# Patient Record
Sex: FEMALE | Race: WHITE | Employment: FULL TIME | URBAN - NONMETROPOLITAN AREA
[De-identification: names, ages, dates, MRNs, and addresses within clinical notes are randomized per-mention and may not be internally consistent; named-entity substitution may affect disease eponyms.]

---

## 2017-04-05 ENCOUNTER — HOSPITAL ENCOUNTER (OUTPATIENT)
Dept: PHARMACY | Age: 77
Setting detail: THERAPIES SERIES
Discharge: HOME | End: 2017-04-05
Payer: MEDICARE

## 2017-04-05 ENCOUNTER — HOSPITAL ENCOUNTER (OUTPATIENT)
Age: 77
Discharge: HOME | End: 2017-04-05
Payer: MEDICARE

## 2017-04-05 VITALS
HEART RATE: 81 BPM | DIASTOLIC BLOOD PRESSURE: 69 MMHG | BODY MASS INDEX: 35.53 KG/M2 | SYSTOLIC BLOOD PRESSURE: 134 MMHG | WEIGHT: 207 LBS

## 2017-04-05 DIAGNOSIS — R73.01 IMPAIRED FASTING GLUCOSE: ICD-10-CM

## 2017-04-05 DIAGNOSIS — E78.2 MIXED HYPERLIPIDEMIA: ICD-10-CM

## 2017-04-05 DIAGNOSIS — Z79.01 LONG TERM CURRENT USE OF ANTICOAGULANT THERAPY: ICD-10-CM

## 2017-04-05 LAB
ALT SERPL-CCNC: 18 U/L (ref 5–33)
ANION GAP SERPL CALCULATED.3IONS-SCNC: 11 MMOL/L (ref 9–17)
AST SERPL-CCNC: 17 U/L
BUN BLDV-MCNC: 16 MG/DL (ref 8–23)
BUN/CREAT BLD: 26 (ref 9–20)
CALCIUM SERPL-MCNC: 9.4 MG/DL (ref 8.6–10.4)
CHLORIDE BLD-SCNC: 104 MMOL/L (ref 98–107)
CHOLESTEROL/HDL RATIO: 3.3
CHOLESTEROL: 200 MG/DL
CO2: 26 MMOL/L (ref 20–31)
CREAT SERPL-MCNC: 0.62 MG/DL (ref 0.5–0.9)
ESTIMATED AVERAGE GLUCOSE: 148 MG/DL
GFR AFRICAN AMERICAN: >60 ML/MIN
GFR NON-AFRICAN AMERICAN: >60 ML/MIN
GFR SERPL CREATININE-BSD FRML MDRD: ABNORMAL ML/MIN/{1.73_M2}
GFR SERPL CREATININE-BSD FRML MDRD: ABNORMAL ML/MIN/{1.73_M2}
GLUCOSE BLD-MCNC: 117 MG/DL (ref 70–99)
HBA1C MFR BLD: 6.8 % (ref 4.8–5.9)
HDLC SERPL-MCNC: 61 MG/DL
INR BLD: 2.5
LDL CHOLESTEROL: 102 MG/DL (ref 0–130)
POTASSIUM SERPL-SCNC: 4.7 MMOL/L (ref 3.7–5.3)
SODIUM BLD-SCNC: 141 MMOL/L (ref 135–144)
TRIGL SERPL-MCNC: 186 MG/DL
VLDLC SERPL CALC-MCNC: ABNORMAL MG/DL (ref 1–30)

## 2017-04-05 PROCEDURE — 36415 COLL VENOUS BLD VENIPUNCTURE: CPT

## 2017-04-05 PROCEDURE — 80048 BASIC METABOLIC PNL TOTAL CA: CPT

## 2017-04-05 PROCEDURE — 84450 TRANSFERASE (AST) (SGOT): CPT

## 2017-04-05 PROCEDURE — 83036 HEMOGLOBIN GLYCOSYLATED A1C: CPT

## 2017-04-05 PROCEDURE — G0463 HOSPITAL OUTPT CLINIC VISIT: HCPCS

## 2017-04-05 PROCEDURE — 85610 PROTHROMBIN TIME: CPT

## 2017-04-05 PROCEDURE — 80061 LIPID PANEL: CPT

## 2017-04-05 PROCEDURE — 84460 ALANINE AMINO (ALT) (SGPT): CPT

## 2017-04-26 ENCOUNTER — ANTI-COAG VISIT (OUTPATIENT)
Dept: PHARMACY | Age: 77
End: 2017-04-26

## 2017-04-26 DIAGNOSIS — Z79.01 LONG TERM CURRENT USE OF ANTICOAGULANT THERAPY: ICD-10-CM

## 2017-05-18 ENCOUNTER — APPOINTMENT (OUTPATIENT)
Dept: PHARMACY | Age: 77
End: 2017-05-18
Payer: MEDICARE

## 2017-05-22 ENCOUNTER — HOSPITAL ENCOUNTER (OUTPATIENT)
Dept: PHARMACY | Age: 77
Setting detail: THERAPIES SERIES
Discharge: HOME | End: 2017-05-22
Payer: MEDICARE

## 2017-05-22 VITALS
HEART RATE: 103 BPM | WEIGHT: 206.8 LBS | DIASTOLIC BLOOD PRESSURE: 79 MMHG | BODY MASS INDEX: 35.5 KG/M2 | SYSTOLIC BLOOD PRESSURE: 147 MMHG

## 2017-05-22 DIAGNOSIS — Z79.01 LONG TERM CURRENT USE OF ANTICOAGULANT THERAPY: ICD-10-CM

## 2017-05-22 LAB — INR BLD: 1.8

## 2017-05-22 PROCEDURE — 99211 OFF/OP EST MAY X REQ PHY/QHP: CPT

## 2017-05-22 PROCEDURE — G0463 HOSPITAL OUTPT CLINIC VISIT: HCPCS

## 2017-05-22 PROCEDURE — 85610 PROTHROMBIN TIME: CPT

## 2017-05-31 ENCOUNTER — APPOINTMENT (OUTPATIENT)
Dept: PHARMACY | Age: 77
End: 2017-05-31
Payer: MEDICARE

## 2017-07-10 ENCOUNTER — HOSPITAL ENCOUNTER (OUTPATIENT)
Age: 77
Discharge: HOME | End: 2017-07-10
Payer: MEDICARE

## 2017-07-10 ENCOUNTER — HOSPITAL ENCOUNTER (OUTPATIENT)
Dept: PHARMACY | Age: 77
Setting detail: THERAPIES SERIES
Discharge: HOME | End: 2017-07-10
Payer: MEDICARE

## 2017-07-10 VITALS
DIASTOLIC BLOOD PRESSURE: 88 MMHG | WEIGHT: 208.9 LBS | HEART RATE: 73 BPM | BODY MASS INDEX: 35.86 KG/M2 | SYSTOLIC BLOOD PRESSURE: 147 MMHG

## 2017-07-10 DIAGNOSIS — Z79.01 LONG TERM CURRENT USE OF ANTICOAGULANT THERAPY: ICD-10-CM

## 2017-07-10 LAB
HEMOGLOBIN: 14.3 G/DL (ref 12–16)
INR BLD: 2

## 2017-07-10 PROCEDURE — 85018 HEMOGLOBIN: CPT

## 2017-07-10 PROCEDURE — 99211 OFF/OP EST MAY X REQ PHY/QHP: CPT | Performed by: FAMILY MEDICINE

## 2017-07-10 PROCEDURE — 85610 PROTHROMBIN TIME: CPT | Performed by: FAMILY MEDICINE

## 2017-07-10 PROCEDURE — G0463 HOSPITAL OUTPT CLINIC VISIT: HCPCS | Performed by: FAMILY MEDICINE

## 2017-07-10 PROCEDURE — 36415 COLL VENOUS BLD VENIPUNCTURE: CPT

## 2017-07-12 ENCOUNTER — APPOINTMENT (OUTPATIENT)
Dept: PHARMACY | Age: 77
End: 2017-07-12
Payer: MEDICARE

## 2017-08-10 ENCOUNTER — HOSPITAL ENCOUNTER (OUTPATIENT)
Dept: PHARMACY | Age: 77
Setting detail: THERAPIES SERIES
Discharge: HOME | End: 2017-08-10
Payer: MEDICARE

## 2017-08-10 VITALS
HEART RATE: 77 BPM | DIASTOLIC BLOOD PRESSURE: 96 MMHG | BODY MASS INDEX: 38.34 KG/M2 | WEIGHT: 209.6 LBS | SYSTOLIC BLOOD PRESSURE: 150 MMHG

## 2017-08-10 DIAGNOSIS — Z79.01 LONG TERM CURRENT USE OF ANTICOAGULANT THERAPY: ICD-10-CM

## 2017-08-10 LAB — INR BLD: 3.6

## 2017-08-10 PROCEDURE — 85610 PROTHROMBIN TIME: CPT | Performed by: FAMILY MEDICINE

## 2017-08-10 PROCEDURE — 99211 OFF/OP EST MAY X REQ PHY/QHP: CPT | Performed by: FAMILY MEDICINE

## 2017-08-16 DIAGNOSIS — N90.89 VULVAR IRRITATION: ICD-10-CM

## 2017-08-16 RX ORDER — ACYCLOVIR 50 MG/G
OINTMENT TOPICAL
Qty: 1 TUBE | Refills: 3 | Status: SHIPPED | OUTPATIENT
Start: 2017-08-16 | End: 2017-08-23

## 2017-08-31 ENCOUNTER — HOSPITAL ENCOUNTER (OUTPATIENT)
Age: 77
Setting detail: SPECIMEN
Discharge: HOME | End: 2017-08-31
Payer: MEDICARE

## 2017-08-31 LAB
ANION GAP SERPL CALCULATED.3IONS-SCNC: 14 MMOL/L (ref 9–17)
BUN BLDV-MCNC: 10 MG/DL (ref 8–23)
BUN/CREAT BLD: 17 (ref 9–20)
CALCIUM SERPL-MCNC: 9.2 MG/DL (ref 8.6–10.4)
CHLORIDE BLD-SCNC: 100 MMOL/L (ref 98–107)
CO2: 27 MMOL/L (ref 20–31)
CREAT SERPL-MCNC: 0.6 MG/DL (ref 0.5–0.9)
GFR AFRICAN AMERICAN: >60 ML/MIN
GFR NON-AFRICAN AMERICAN: >60 ML/MIN
GFR SERPL CREATININE-BSD FRML MDRD: NORMAL ML/MIN/{1.73_M2}
GFR SERPL CREATININE-BSD FRML MDRD: NORMAL ML/MIN/{1.73_M2}
GLUCOSE BLD-MCNC: 97 MG/DL (ref 70–99)
HCT VFR BLD CALC: 36.9 % (ref 36–46)
HEMOGLOBIN: 12.3 G/DL (ref 12–16)
INR BLD: 1.3
MCH RBC QN AUTO: 29.8 PG (ref 26–34)
MCHC RBC AUTO-ENTMCNC: 33.5 G/DL (ref 31–37)
MCV RBC AUTO: 88.9 FL (ref 80–100)
PDW BLD-RTO: 13.8 % (ref 11.5–14.5)
PLATELET # BLD: 261 K/UL (ref 130–400)
PMV BLD AUTO: ABNORMAL FL (ref 6–12)
POTASSIUM SERPL-SCNC: 4.4 MMOL/L (ref 3.7–5.3)
PROTHROMBIN TIME: 13.1 SEC (ref 9.7–11.6)
RBC # BLD: 4.14 M/UL (ref 4–5.2)
SODIUM BLD-SCNC: 141 MMOL/L (ref 135–144)
WBC # BLD: 11.3 K/UL (ref 3.5–11)

## 2017-08-31 PROCEDURE — P9603 ONE-WAY ALLOW PRORATED MILES: HCPCS

## 2017-08-31 PROCEDURE — 36415 COLL VENOUS BLD VENIPUNCTURE: CPT

## 2017-08-31 PROCEDURE — 85610 PROTHROMBIN TIME: CPT

## 2017-08-31 PROCEDURE — 80048 BASIC METABOLIC PNL TOTAL CA: CPT

## 2017-08-31 PROCEDURE — 85027 COMPLETE CBC AUTOMATED: CPT

## 2017-09-01 ENCOUNTER — HOSPITAL ENCOUNTER (OUTPATIENT)
Age: 77
Setting detail: SPECIMEN
Discharge: HOME | End: 2017-09-01
Payer: MEDICARE

## 2017-09-01 LAB
INR BLD: 1.3
PROTHROMBIN TIME: 13.4 SEC (ref 9.7–11.6)

## 2017-09-01 PROCEDURE — 85610 PROTHROMBIN TIME: CPT

## 2017-09-01 PROCEDURE — P9603 ONE-WAY ALLOW PRORATED MILES: HCPCS

## 2017-09-01 PROCEDURE — 36415 COLL VENOUS BLD VENIPUNCTURE: CPT

## 2017-09-02 ENCOUNTER — HOSPITAL ENCOUNTER (OUTPATIENT)
Age: 77
Setting detail: SPECIMEN
Discharge: HOME | End: 2017-09-02
Payer: MEDICARE

## 2017-09-02 LAB
INR BLD: 1.2
PROTHROMBIN TIME: 12.7 SEC (ref 9.7–11.6)

## 2017-09-02 PROCEDURE — P9603 ONE-WAY ALLOW PRORATED MILES: HCPCS

## 2017-09-02 PROCEDURE — 85610 PROTHROMBIN TIME: CPT

## 2017-09-02 PROCEDURE — 36415 COLL VENOUS BLD VENIPUNCTURE: CPT

## 2017-09-04 ENCOUNTER — HOSPITAL ENCOUNTER (OUTPATIENT)
Age: 77
Setting detail: SPECIMEN
Discharge: HOME | End: 2017-09-04
Payer: MEDICARE

## 2017-09-04 LAB
INR BLD: 1.2
PROTHROMBIN TIME: 12.8 SEC (ref 9.7–11.6)

## 2017-09-04 PROCEDURE — 36415 COLL VENOUS BLD VENIPUNCTURE: CPT

## 2017-09-04 PROCEDURE — 85610 PROTHROMBIN TIME: CPT

## 2017-09-04 PROCEDURE — P9603 ONE-WAY ALLOW PRORATED MILES: HCPCS

## 2017-09-05 ENCOUNTER — HOSPITAL ENCOUNTER (OUTPATIENT)
Age: 77
Setting detail: SPECIMEN
Discharge: HOME | End: 2017-09-05
Payer: MEDICARE

## 2017-09-05 LAB
INR BLD: 1.1
PROTHROMBIN TIME: 11.6 SEC (ref 9.7–11.6)

## 2017-09-05 PROCEDURE — 85610 PROTHROMBIN TIME: CPT

## 2017-09-05 PROCEDURE — P9603 ONE-WAY ALLOW PRORATED MILES: HCPCS

## 2017-09-05 PROCEDURE — 36415 COLL VENOUS BLD VENIPUNCTURE: CPT

## 2017-09-08 ENCOUNTER — HOSPITAL ENCOUNTER (OUTPATIENT)
Age: 77
Setting detail: SPECIMEN
Discharge: HOME | End: 2017-09-08
Payer: MEDICARE

## 2017-09-08 LAB
INR BLD: 1.4
PROTHROMBIN TIME: 14.7 SEC (ref 9.7–11.6)

## 2017-09-08 PROCEDURE — P9603 ONE-WAY ALLOW PRORATED MILES: HCPCS

## 2017-09-08 PROCEDURE — 36415 COLL VENOUS BLD VENIPUNCTURE: CPT

## 2017-09-08 PROCEDURE — 85610 PROTHROMBIN TIME: CPT

## 2017-09-09 ENCOUNTER — HOSPITAL ENCOUNTER (OUTPATIENT)
Age: 77
Setting detail: SPECIMEN
Discharge: HOME | End: 2017-09-09
Payer: MEDICARE

## 2017-09-09 LAB
INR BLD: 1.6
PROTHROMBIN TIME: 17.3 SEC (ref 9.7–11.6)

## 2017-09-09 PROCEDURE — 85610 PROTHROMBIN TIME: CPT

## 2017-09-09 PROCEDURE — 36415 COLL VENOUS BLD VENIPUNCTURE: CPT

## 2017-09-09 PROCEDURE — P9603 ONE-WAY ALLOW PRORATED MILES: HCPCS

## 2017-09-12 ENCOUNTER — HOSPITAL ENCOUNTER (OUTPATIENT)
Age: 77
Setting detail: SPECIMEN
Discharge: HOME | End: 2017-09-12
Payer: MEDICARE

## 2017-09-12 LAB
INR BLD: 2
PROTHROMBIN TIME: 21.4 SEC (ref 9.7–11.6)

## 2017-09-12 PROCEDURE — 36415 COLL VENOUS BLD VENIPUNCTURE: CPT

## 2017-09-12 PROCEDURE — P9603 ONE-WAY ALLOW PRORATED MILES: HCPCS

## 2017-09-12 PROCEDURE — 85610 PROTHROMBIN TIME: CPT

## 2017-09-14 ENCOUNTER — HOSPITAL ENCOUNTER (OUTPATIENT)
Age: 77
Setting detail: SPECIMEN
Discharge: HOME | End: 2017-09-14
Payer: MEDICARE

## 2017-09-14 LAB
INR BLD: 1.3
PROTHROMBIN TIME: 13.4 SEC (ref 9.7–11.6)

## 2017-09-14 PROCEDURE — P9603 ONE-WAY ALLOW PRORATED MILES: HCPCS

## 2017-09-14 PROCEDURE — 85610 PROTHROMBIN TIME: CPT

## 2017-09-14 PROCEDURE — 36415 COLL VENOUS BLD VENIPUNCTURE: CPT

## 2017-09-21 ENCOUNTER — HOSPITAL ENCOUNTER (OUTPATIENT)
Dept: PHARMACY | Age: 77
Setting detail: THERAPIES SERIES
Discharge: HOME | End: 2017-09-21
Payer: MEDICARE

## 2017-09-21 VITALS — BODY MASS INDEX: 37.31 KG/M2 | WEIGHT: 204 LBS

## 2017-09-21 DIAGNOSIS — Z79.01 LONG TERM CURRENT USE OF ANTICOAGULANT THERAPY: ICD-10-CM

## 2017-09-21 LAB — INR BLD: 3.3

## 2017-09-21 PROCEDURE — 99211 OFF/OP EST MAY X REQ PHY/QHP: CPT

## 2017-09-21 PROCEDURE — 85610 PROTHROMBIN TIME: CPT

## 2017-09-23 ENCOUNTER — APPOINTMENT (OUTPATIENT)
Dept: CT IMAGING | Age: 77
End: 2017-09-23
Payer: MEDICARE

## 2017-09-23 ENCOUNTER — HOSPITAL ENCOUNTER (EMERGENCY)
Age: 77
Discharge: ANOTHER HOSPITAL | End: 2017-09-23
Attending: EMERGENCY MEDICINE
Payer: MEDICARE

## 2017-09-23 ENCOUNTER — HOSPITAL ENCOUNTER (EMERGENCY)
Age: 77
Discharge: HOME | End: 2017-09-23
Attending: EMERGENCY MEDICINE
Payer: MEDICARE

## 2017-09-23 ENCOUNTER — APPOINTMENT (OUTPATIENT)
Dept: GENERAL RADIOLOGY | Age: 77
End: 2017-09-23
Payer: MEDICARE

## 2017-09-23 VITALS
SYSTOLIC BLOOD PRESSURE: 110 MMHG | RESPIRATION RATE: 25 BRPM | TEMPERATURE: 102.8 F | HEART RATE: 116 BPM | DIASTOLIC BLOOD PRESSURE: 73 MMHG | OXYGEN SATURATION: 94 %

## 2017-09-23 VITALS
RESPIRATION RATE: 16 BRPM | DIASTOLIC BLOOD PRESSURE: 56 MMHG | SYSTOLIC BLOOD PRESSURE: 107 MMHG | TEMPERATURE: 99.7 F | HEART RATE: 95 BPM | OXYGEN SATURATION: 91 %

## 2017-09-23 DIAGNOSIS — M54.50 ACUTE BILATERAL LOW BACK PAIN WITHOUT SCIATICA: ICD-10-CM

## 2017-09-23 DIAGNOSIS — R50.9 FEVER IN ADULT: ICD-10-CM

## 2017-09-23 DIAGNOSIS — A41.9 SEPSIS, DUE TO UNSPECIFIED ORGANISM: ICD-10-CM

## 2017-09-23 DIAGNOSIS — R50.9 FEVER IN ADULT: Primary | ICD-10-CM

## 2017-09-23 DIAGNOSIS — M54.50 ACUTE BILATERAL LOW BACK PAIN WITHOUT SCIATICA: Primary | ICD-10-CM

## 2017-09-23 DIAGNOSIS — D72.829 LEUKOCYTOSIS, UNSPECIFIED TYPE: ICD-10-CM

## 2017-09-23 LAB
-: ABNORMAL
ABSOLUTE BANDS #: 1.4 K/UL (ref 0–1)
ABSOLUTE EOS #: 0 K/UL (ref 0–0.4)
ABSOLUTE LYMPH #: 1.24 K/UL (ref 1–4.8)
ABSOLUTE MONO #: 1.4 K/UL (ref 0–1)
ALBUMIN SERPL-MCNC: 4 G/DL (ref 3.5–5.2)
ALBUMIN/GLOBULIN RATIO: 1.1 (ref 1–2.5)
ALLEN TEST: ABNORMAL
ALP BLD-CCNC: 85 U/L (ref 35–104)
ALT SERPL-CCNC: 11 U/L (ref 5–33)
AMORPHOUS: ABNORMAL
ANION GAP SERPL CALCULATED.3IONS-SCNC: 16 MMOL/L (ref 9–17)
AST SERPL-CCNC: 12 U/L
BACTERIA: ABNORMAL
BANDS: 9 %
BASOPHILS # BLD: 0 %
BASOPHILS ABSOLUTE: 0 K/UL (ref 0–0.2)
BILIRUB SERPL-MCNC: 0.78 MG/DL (ref 0.3–1.2)
BILIRUBIN URINE: NEGATIVE
BUN BLDV-MCNC: 7 MG/DL (ref 8–23)
BUN/CREAT BLD: 12 (ref 9–20)
CALCIUM SERPL-MCNC: 9.2 MG/DL (ref 8.6–10.4)
CARBOXYHEMOGLOBIN: ABNORMAL % (ref 0–5)
CASTS UA: ABNORMAL /LPF
CHLORIDE BLD-SCNC: 97 MMOL/L (ref 98–107)
CO2: 22 MMOL/L (ref 20–31)
COLOR: YELLOW
COMMENT UA: ABNORMAL
CREAT SERPL-MCNC: 0.58 MG/DL (ref 0.5–0.9)
CRYSTALS, UA: ABNORMAL /HPF
DIFFERENTIAL TYPE: ABNORMAL
EOSINOPHILS RELATIVE PERCENT: 0 %
EPITHELIAL CELLS UA: ABNORMAL /HPF (ref 0–25)
FIO2: ABNORMAL
GFR AFRICAN AMERICAN: >60 ML/MIN
GFR NON-AFRICAN AMERICAN: >60 ML/MIN
GFR SERPL CREATININE-BSD FRML MDRD: ABNORMAL ML/MIN/{1.73_M2}
GFR SERPL CREATININE-BSD FRML MDRD: ABNORMAL ML/MIN/{1.73_M2}
GLUCOSE BLD-MCNC: 132 MG/DL (ref 70–99)
GLUCOSE URINE: NEGATIVE
HCO3 VENOUS: 25.6 MMOL/L (ref 24–30)
HCT VFR BLD CALC: 37.1 % (ref 36–46)
HEMOGLOBIN: 12.2 G/DL (ref 12–16)
INR BLD: 2 (ref 0.9–1.2)
KETONES, URINE: ABNORMAL
LACTIC ACID, WHOLE BLOOD: NORMAL MMOL/L (ref 0.7–2.1)
LACTIC ACID: 1.1 MMOL/L (ref 0.5–2.2)
LEUKOCYTE ESTERASE, URINE: NEGATIVE
LIPASE: 15 U/L (ref 13–60)
LYMPHOCYTES # BLD: 8 %
MCH RBC QN AUTO: 29.1 PG (ref 26–34)
MCHC RBC AUTO-ENTMCNC: 32.9 G/DL (ref 31–37)
MCV RBC AUTO: 88.4 FL (ref 80–100)
METHEMOGLOBIN: ABNORMAL % (ref 0–1.9)
MODE: ABNORMAL
MONOCYTES # BLD: 9 %
MORPHOLOGY: NORMAL
MUCUS: ABNORMAL
NEGATIVE BASE EXCESS, VEN: ABNORMAL MMOL/L (ref 0–2)
NITRITE, URINE: NEGATIVE
NOTIFICATION TIME: ABNORMAL
NOTIFICATION: ABNORMAL
O2 DEVICE/FLOW/%: ABNORMAL
O2 SAT, VEN: 55.1 % (ref 60–85)
OTHER OBSERVATIONS UA: ABNORMAL
OXYHEMOGLOBIN: ABNORMAL % (ref 95–98)
PATIENT TEMP: 37
PCO2, VEN, TEMP ADJ: ABNORMAL MMHG (ref 39–55)
PCO2, VEN: 42.4 (ref 39–55)
PDW BLD-RTO: 14.3 % (ref 12.1–15.2)
PEEP/CPAP: ABNORMAL
PH UA: 6 (ref 5–9)
PH VENOUS: 7.4 (ref 7.32–7.42)
PH, VEN, TEMP ADJ: ABNORMAL (ref 7.32–7.42)
PLATELET # BLD: 351 K/UL (ref 140–450)
PLATELET ESTIMATE: ABNORMAL
PMV BLD AUTO: 7.6 FL (ref 6–12)
PO2, VEN, TEMP ADJ: ABNORMAL MMHG (ref 30–50)
PO2, VEN: 29.2 (ref 30–50)
POSITIVE BASE EXCESS, VEN: 0.6 MMOL/L (ref 0–2)
POTASSIUM SERPL-SCNC: 4.1 MMOL/L (ref 3.7–5.3)
PROTEIN UA: NEGATIVE
PROTHROMBIN TIME: 21.4 SEC (ref 9.7–12.2)
PSV: ABNORMAL
PT. POSITION: ABNORMAL
RBC # BLD: 4.2 M/UL (ref 4–5.2)
RBC # BLD: ABNORMAL 10*6/UL
RBC UA: ABNORMAL /HPF (ref 0–2)
RENAL EPITHELIAL, UA: ABNORMAL /HPF
RESPIRATORY RATE: ABNORMAL
SAMPLE SITE: ABNORMAL
SEG NEUTROPHILS: 74 %
SEGMENTED NEUTROPHILS ABSOLUTE COUNT: 11.46 K/UL (ref 1.8–7.7)
SET RATE: ABNORMAL
SODIUM BLD-SCNC: 135 MMOL/L (ref 135–144)
SPECIFIC GRAVITY UA: 1.01 (ref 1.01–1.02)
TEXT FOR RESPIRATORY: ABNORMAL
TOTAL HB: ABNORMAL G/DL (ref 12–16)
TOTAL PROTEIN: 7.5 G/DL (ref 6.4–8.3)
TOTAL RATE: ABNORMAL
TRICHOMONAS: ABNORMAL
TURBIDITY: CLEAR
URINE HGB: NEGATIVE
UROBILINOGEN, URINE: NORMAL
VT: ABNORMAL
WBC # BLD: 15.5 K/UL (ref 3.5–11)
WBC # BLD: ABNORMAL 10*3/UL
WBC UA: ABNORMAL /HPF (ref 0–5)
YEAST: ABNORMAL

## 2017-09-23 PROCEDURE — 86403 PARTICLE AGGLUT ANTBDY SCRN: CPT

## 2017-09-23 PROCEDURE — 96361 HYDRATE IV INFUSION ADD-ON: CPT

## 2017-09-23 PROCEDURE — 83690 ASSAY OF LIPASE: CPT

## 2017-09-23 PROCEDURE — 99285 EMERGENCY DEPT VISIT HI MDM: CPT

## 2017-09-23 PROCEDURE — 2580000003 HC RX 258: Performed by: EMERGENCY MEDICINE

## 2017-09-23 PROCEDURE — 87186 SC STD MICRODIL/AGAR DIL: CPT

## 2017-09-23 PROCEDURE — 74177 CT ABD & PELVIS W/CONTRAST: CPT

## 2017-09-23 PROCEDURE — 93005 ELECTROCARDIOGRAM TRACING: CPT

## 2017-09-23 PROCEDURE — 85025 COMPLETE CBC W/AUTO DIFF WBC: CPT

## 2017-09-23 PROCEDURE — 81001 URINALYSIS AUTO W/SCOPE: CPT

## 2017-09-23 PROCEDURE — 6360000002 HC RX W HCPCS: Performed by: EMERGENCY MEDICINE

## 2017-09-23 PROCEDURE — 96375 TX/PRO/DX INJ NEW DRUG ADDON: CPT

## 2017-09-23 PROCEDURE — 80053 COMPREHEN METABOLIC PANEL: CPT

## 2017-09-23 PROCEDURE — 85610 PROTHROMBIN TIME: CPT

## 2017-09-23 PROCEDURE — 96367 TX/PROPH/DG ADDL SEQ IV INF: CPT

## 2017-09-23 PROCEDURE — 6360000004 HC RX CONTRAST MEDICATION: Performed by: EMERGENCY MEDICINE

## 2017-09-23 PROCEDURE — 71020 XR CHEST STANDARD TWO VW: CPT

## 2017-09-23 PROCEDURE — 83605 ASSAY OF LACTIC ACID: CPT

## 2017-09-23 PROCEDURE — 87205 SMEAR GRAM STAIN: CPT

## 2017-09-23 PROCEDURE — 36415 COLL VENOUS BLD VENIPUNCTURE: CPT

## 2017-09-23 PROCEDURE — 99284 EMERGENCY DEPT VISIT MOD MDM: CPT

## 2017-09-23 PROCEDURE — 6370000000 HC RX 637 (ALT 250 FOR IP): Performed by: EMERGENCY MEDICINE

## 2017-09-23 PROCEDURE — 96365 THER/PROPH/DIAG IV INF INIT: CPT

## 2017-09-23 PROCEDURE — 87040 BLOOD CULTURE FOR BACTERIA: CPT

## 2017-09-23 PROCEDURE — 82805 BLOOD GASES W/O2 SATURATION: CPT

## 2017-09-23 PROCEDURE — 96374 THER/PROPH/DIAG INJ IV PUSH: CPT

## 2017-09-23 RX ORDER — ONDANSETRON 2 MG/ML
4 INJECTION INTRAMUSCULAR; INTRAVENOUS ONCE
Status: COMPLETED | OUTPATIENT
Start: 2017-09-23 | End: 2017-09-23

## 2017-09-23 RX ORDER — CYCLOBENZAPRINE HCL 10 MG
5 TABLET ORAL 3 TIMES DAILY PRN
Qty: 20 TABLET | Refills: 0 | Status: SHIPPED | OUTPATIENT
Start: 2017-09-23 | End: 2017-10-03

## 2017-09-23 RX ORDER — HYDROCODONE BITARTRATE AND ACETAMINOPHEN 5; 325 MG/1; MG/1
1 TABLET ORAL EVERY 6 HOURS PRN
COMMUNITY
End: 2017-11-10

## 2017-09-23 RX ORDER — ACETAMINOPHEN 500 MG
1000 TABLET ORAL ONCE
Status: COMPLETED | OUTPATIENT
Start: 2017-09-23 | End: 2017-09-23

## 2017-09-23 RX ORDER — SODIUM CHLORIDE 9 MG/ML
INJECTION, SOLUTION INTRAVENOUS CONTINUOUS
Status: DISCONTINUED | OUTPATIENT
Start: 2017-09-23 | End: 2017-09-23 | Stop reason: HOSPADM

## 2017-09-23 RX ORDER — FENTANYL CITRATE 50 UG/ML
25 INJECTION, SOLUTION INTRAMUSCULAR; INTRAVENOUS ONCE
Status: COMPLETED | OUTPATIENT
Start: 2017-09-23 | End: 2017-09-23

## 2017-09-23 RX ORDER — OXYCODONE HYDROCHLORIDE 5 MG/1
5 TABLET ORAL EVERY 4 HOURS PRN
COMMUNITY
End: 2017-11-10 | Stop reason: ALTCHOICE

## 2017-09-23 RX ORDER — MORPHINE SULFATE 10 MG/ML
4 INJECTION, SOLUTION INTRAMUSCULAR; INTRAVENOUS ONCE
Status: COMPLETED | OUTPATIENT
Start: 2017-09-23 | End: 2017-09-23

## 2017-09-23 RX ADMIN — SODIUM CHLORIDE: 9 INJECTION, SOLUTION INTRAVENOUS at 13:39

## 2017-09-23 RX ADMIN — ONDANSETRON 4 MG: 2 INJECTION INTRAMUSCULAR; INTRAVENOUS at 11:59

## 2017-09-23 RX ADMIN — FENTANYL CITRATE 25 MCG: 50 INJECTION INTRAMUSCULAR; INTRAVENOUS at 18:41

## 2017-09-23 RX ADMIN — IOPAMIDOL 100 ML: 612 INJECTION, SOLUTION INTRAVENOUS at 13:02

## 2017-09-23 RX ADMIN — SODIUM CHLORIDE 999 ML: 9 INJECTION, SOLUTION INTRAVENOUS at 18:41

## 2017-09-23 RX ADMIN — ACETAMINOPHEN 1000 MG: 500 TABLET ORAL at 13:24

## 2017-09-23 RX ADMIN — HYDROMORPHONE HYDROCHLORIDE 0.5 MG: 1 INJECTION, SOLUTION INTRAMUSCULAR; INTRAVENOUS; SUBCUTANEOUS at 12:30

## 2017-09-23 RX ADMIN — ONDANSETRON 4 MG: 2 INJECTION INTRAMUSCULAR; INTRAVENOUS at 20:27

## 2017-09-23 RX ADMIN — SODIUM CHLORIDE: 9 INJECTION, SOLUTION INTRAVENOUS at 20:27

## 2017-09-23 RX ADMIN — PIPERACILLIN SODIUM,TAZOBACTAM SODIUM 3.38 G: 3; .375 INJECTION, POWDER, FOR SOLUTION INTRAVENOUS at 18:47

## 2017-09-23 RX ADMIN — SODIUM CHLORIDE 500 ML: 9 INJECTION, SOLUTION INTRAVENOUS at 11:59

## 2017-09-23 RX ADMIN — MORPHINE SULFATE 4 MG: 10 INJECTION, SOLUTION INTRAMUSCULAR; INTRAVENOUS at 11:59

## 2017-09-23 RX ADMIN — ACETAMINOPHEN 1000 MG: 500 TABLET ORAL at 20:28

## 2017-09-23 RX ADMIN — VANCOMYCIN HYDROCHLORIDE 1000 MG: 1 INJECTION, POWDER, LYOPHILIZED, FOR SOLUTION INTRAVENOUS at 19:18

## 2017-09-23 ASSESSMENT — PAIN SCALES - GENERAL
PAINLEVEL_OUTOF10: 10
PAINLEVEL_OUTOF10: 6
PAINLEVEL_OUTOF10: 10
PAINLEVEL_OUTOF10: 5
PAINLEVEL_OUTOF10: 5
PAINLEVEL_OUTOF10: 6
PAINLEVEL_OUTOF10: 5
PAINLEVEL_OUTOF10: 6
PAINLEVEL_OUTOF10: 2
PAINLEVEL_OUTOF10: 4

## 2017-09-23 ASSESSMENT — PAIN DESCRIPTION - ORIENTATION: ORIENTATION: LOWER

## 2017-09-23 ASSESSMENT — PAIN DESCRIPTION - PAIN TYPE
TYPE: ACUTE PAIN

## 2017-09-23 ASSESSMENT — PAIN DESCRIPTION - PROGRESSION
CLINICAL_PROGRESSION: GRADUALLY IMPROVING
CLINICAL_PROGRESSION: GRADUALLY IMPROVING

## 2017-09-23 ASSESSMENT — PAIN DESCRIPTION - LOCATION
LOCATION: BACK

## 2017-09-23 ASSESSMENT — PAIN DESCRIPTION - DESCRIPTORS
DESCRIPTORS: ACHING
DESCRIPTORS: ACHING

## 2017-09-23 NOTE — ED NOTES
[facility] called back with Dr. Stephanie Dasilva on line. Currently speaking with Dr. Kris Wilcox.      [facility] JESSY Reser  09/23/17 6809

## 2017-09-23 NOTE — ED AVS SNAPSHOT
After Visit Summary  (Discharge Instructions)    Medication List for Home    Based on the information you provided to us as well as any changes during this visit, the following is your updated medication list.  Compare this with your prescription bottles at home. If you have any questions or concerns, contact your primary care physician's office. Daily Medication List (This medication list can be shared with any Healthcare provider who is helping you manage your medications)      There are NEW medications for you. START taking them after you leave the hospital     cyclobenzaprine 10 MG tablet   Commonly known as:  FLEXERIL   Take 0.5 tablets by mouth 3 times daily as needed for Muscle spasms         ASK your doctor about these medications if you have questions     acetaminophen 500 MG tablet   Commonly known as:  TYLENOL   Take 500 mg by mouth every 4 hours as needed       ALPRAZolam 0.25 MG tablet   Commonly known as:  XANAX   Take 1 tablet by mouth daily as needed for Anxiety       CoQ-10 100 MG Caps   Take 100 mg by mouth daily       ezetimibe 10 MG tablet   Commonly known as:  ZETIA   Take 1 tablet by mouth daily       HYDROcodone-acetaminophen 5-325 MG per tablet   Commonly known as:  NORCO   Take 1 tablet by mouth every 6 hours as needed for Pain . oxyCODONE 5 MG immediate release tablet   Commonly known as:  ROXICODONE   Take 5 mg by mouth every 4 hours as needed for Pain .        rosuvastatin 5 MG tablet   Commonly known as:  CRESTOR   Take 1 tablet by mouth nightly       sertraline 100 MG tablet   Commonly known as:  ZOLOFT   Take 1 tablet by mouth daily (take along with 50 mg tablet for total daily dose of 150 mg daily)       traMADol 50 MG tablet   Commonly known as:  ULTRAM   Take 1 tablet by mouth 3 times daily as needed for Pain       warfarin 7.5 MG tablet   Commonly known as:  COUMADIN   Take 1 tablet by mouth daily            Where to Get Your Medications You can get these medications from any pharmacy     Bring a paper prescription for each of these medications     cyclobenzaprine 10 MG tablet               Allergies as of 2017        Reactions    Latex Other (See Comments)    redness    Percocet [Oxycodone-acetaminophen] Itching      Immunizations as of 2017     Name Date Dose VIS Date Route    Influenza Whole 2015 -- -- --    Comment: [facility]    Pneumococcal Conjugate 7-valent 2016 -- -- --    External: Patient reported    [facility]         After Visit Summary    This summary was created for you. Thank you for entrusting your care to us. The following information includes details about your hospital/visit stay along with steps you should take to help with your recovery once you leave the hospital.  In this packet, you will find information about the topics listed below:    · Instructions about your medications including a list of your home medications  · A summary of your hospital visit  · Follow-up appointments once you have left the hospital  · Your care plan at home      You may receive a survey regarding the care you received during your stay. Your input is valuable to us. We encourage you to complete and return your survey in the envelope provided. We hope you will choose us in the future for your healthcare needs. Patient Information     Patient Name ROLF Felipe 1940      Care Provided at:     Name Address Phone       [address] Marquez [phone]            Your Visit    Here you will find information about your visit, including the reason for your visit. Please take this sheet with you when you visit your doctor or other health care provider in the future. It will help determine the best possible medical care for you at that time. If you have any questions once you leave the hospital, please call the department phone number listed below. the hospital.  Your care team will discuss these with you, so you and those caring for you know how to best care for your health needs at home. This section may also include educational information about certain health topics that may be of help to you. Important Information if you smoke or are exposed to smoking       SMOKING: QUIT SMOKING. THIS IS THE MOST IMPORTANT ACTION YOU CAN TAKE TO IMPROVE YOUR CURRENT AND FUTURE HEALTH. Call the [facility]way at [facility] ([phone])    Smoking harms nonsmokers. When nonsmokers are around people who smoke, they absorb nicotine, carbon monoxide, and other ingredients of tobacco smoke. DO NOT SMOKE AROUND CHILDREN     Children exposed to secondhand smoke are at an increased risk of:  Sudden Infant Death Syndrome (SIDS), acute respiratory infections, inflammation of the middle ear, and severe asthma. Over a longer time, it causes heart disease and lung cancer. There is no safe level of exposure to secondhand smoke. Important information for a smoker       SMOKING: QUIT SMOKING. THIS IS THE MOST IMPORTANT ACTION YOU CAN TAKE TO IMPROVE YOUR CURRENT AND FUTURE HEALTH. Call the [address] [place] [address] at [facility] ([phone])    Smoking harms nonsmokers. When nonsmokers are around people who smoke, they absorb nicotine, carbon monoxide, and other ingredients of tobacco smoke. DO NOT SMOKE AROUND CHILDREN     Children exposed to secondhand smoke are at an increased risk of:  Sudden Infant Death Syndrome (SIDS), acute respiratory infections, inflammation of the middle ear, and severe asthma. Over a longer time, it causes heart disease and lung cancer. There is no safe level of exposure to secondhand smoke.                 [employer] Signup     [employer] allows you to send messages to your doctor, view your test results, renew your prescriptions, schedule appointments, view visit Try one of these positions when you lie down:  ¨ Lie on your back with your knees bent and supported by large pillows. ¨ Lie on the floor with your legs on the seat of a sofa or chair. Kelvin Sidle on your side with your knees and hips bent and a pillow between your legs. ¨ Lie on your stomach if it does not make pain worse. · Do not sit up in bed, and avoid soft couches and twisted positions. Bed rest can help relieve pain at first, but it delays healing. Avoid bed rest after the first day of back pain. · Change positions every 30 minutes. If you must sit for long periods of time, take breaks from sitting. Get up and walk around, or lie in a comfortable position. · Try using a heating pad on a low or medium setting for 15 to 20 minutes every 2 or 3 hours. Try a warm shower in place of one session with the heating pad. · You can also try an ice pack for 10 to 15 minutes every 2 to 3 hours. Put a thin cloth between the ice pack and your skin. · Take pain medicines exactly as directed. ¨ If the doctor gave you a prescription medicine for pain, take it as prescribed. ¨ If you are not taking a prescription pain medicine, ask your doctor if you can take an over-the-counter medicine. · Take short walks several times a day. You can start with 5 to 10 minutes, 3 or 4 times a day, and work up to longer walks. Walk on level surfaces and avoid hills and stairs until your back is better. · Return to work and other activities as soon as you can. Continued rest without activity is usually not good for your back. · To prevent future back pain, do exercises to stretch and strengthen your back and stomach. Learn how to use good posture, safe lifting techniques, and proper body mechanics. When should you call for help? Call your doctor now or seek immediate medical care if:  · You have new or worsening numbness in your legs. · You have new or worsening weakness in your legs.  (This could make it hard to stand up.) · You lose control of your bladder or bowels. Watch closely for changes in your health, and be sure to contact your doctor if:  · Your pain gets worse. · You are not getting better after 2 weeks. Where can you learn more? Go to https://chqasim.[employer]rax[employer]. org and sign in to your [employer] account. Enter R502 in the NPC III box to learn more about \"Back Pain: Care Instructions. \"     If you do not have an account, please click on the \"Sign Up Now\" link. Current as of: March 21, 2017  Content Version: 11.3  © 6178-0522 [employer]. Care instructions adapted under license by [facility] ([facility]). If you have questions about a medical condition or this instruction, always ask your healthcare professional. [address] any warranty or liability for your use of this information. More Information           Learning About Fever  What is a fever? A fever is a high body temperature. It's one way your body fights being sick. A fever shows that the body is responding to infection or other illnesses, both minor and severe. A fever is a symptom, not an illness by itself. A fever can be a sign that you are ill, but most fevers are not caused by a serious problem. You may have a fever with a minor illness, such as a cold. But sometimes a very serious infection may cause little or no fever. It is important to look at other symptoms, other conditions you have, and how you feel in general. In children, notice how they act and see what symptoms they complain of. What is a normal body temperature? A normal body temperature is about 98. 6ºF. Some people have a normal temperature that is a little higher or a little lower than this. Your temperature may be a little lower in the morning than it is later in the day. It may go up during hot weather or when you exercise, wear heavy clothes, or take a hot bath. or this instruction, always ask your healthcare professional. [place] [address] any warranty or liability for your use of this information.

## 2017-09-23 NOTE — ED NOTES
Alexandra Roberson [address] Access re possible transfer. Access currently speaking with Dr. Jamila Joseph.      Magdy JIMÉNEZ Reser  09/23/17 2483

## 2017-09-23 NOTE — ED NOTES
Dr Zofia Leiva paged covering for Dr Salome Oswald via answering service     Clara Rollins  09/23/17 5060

## 2017-09-23 NOTE — ED PROVIDER NOTES
[facility] ED  eMERGENCY dEPARTMENT eNCOUnter      Pt Name: Claudeen Scurry  MRN: [id number]  [place] 1940  Date of evaluation: 9/23/2017  Provider: Max Sinha. Jeannine Lewis, [address] [place] [address]       Chief Complaint   Patient presents with    Lower Back Pain     Onset yesterday morning.  c/o lower back pain without radiation to legs. Onset gradual but denies injury that she can remember. HISTORY OF PRESENT ILLNESS   (Location/Symptom, Timing/Onset, Context/Setting, Quality, Duration, Modifying Factors, Severity)  Note limiting factors. Claudeen Scurry is a 68 y.o. female who presents to the emergency department complaining of low back pain bilaterally. No fall or injury. She was walking with her walker yesterday, pain started suddenly. She had right knee TKA end of august in [place]. She has never had back pain. No difficulty breathing, constant, no change with respiration. Movement does not affect pain, but worse supine. No lightheadedness or chest pain. No lower extremity symptoms    HPI      Nursing Notes were reviewed. REVIEW OF SYSTEMS    (2-9 systems for level 4, 10 or more for level 5)     Review of Systems    Except as noted above the remainder of the review of systems was reviewed and negative.        PAST MEDICAL HISTORY     Past Medical History:   Diagnosis Date    Adenomatous colon polyp 7/2015    tubular adenoma    Anxiety     Depression     Diverticulitis 2009    Factor V Leiden ([facility] [address]     GERD (gastroesophageal reflux disease)     History of blood transfusion     no reaction    History of DVT (deep vein thrombosis)     DVT right leg    Hyperlipidemia     Impaired fasting glucose     Kidney stones           SURGICAL HISTORY       Past Surgical History:   Procedure Laterality Date    COLONOSCOPY  2011    Dr. Severa Sayre - no polyps, pan-diverticulosis    COLONOSCOPY  7/23/2015    -polyp (tubular adenoma),diverticulosis,hemorrhoids    CYST REMOVAL      ovary    1103 09/23/17 1103 09/23/17 1103 09/23/17 1103 -- --   155/133 100.3 °F (37.9 °C) Tympanic 102 20 92 %         Physical Exam   Constitutional: She is oriented to person, place, and time. She appears well-developed and well-nourished. She appears distressed (mild secondary to pain). HENT:   Head: Normocephalic and atraumatic. Mouth/Throat: Oropharynx is clear and moist.   Eyes: EOM are normal. Pupils are equal, round, and reactive to light. Neck: Normal range of motion. Neck supple. Cardiovascular: Normal rate, regular rhythm and normal heart sounds. Exam reveals no gallop and no friction rub. No murmur heard. Pulmonary/Chest: Effort normal and breath sounds normal. No respiratory distress. She has no wheezes. She has no rales. Abdominal: Soft. Musculoskeletal: She exhibits no edema or tenderness. Thoracic back: Normal. She exhibits normal range of motion, no tenderness and no bony tenderness. Lumbar back: She exhibits normal range of motion, no tenderness, no bony tenderness and no swelling. Neurological: She is alert and oriented to person, place, and time. Skin: Skin is warm and dry. She is not diaphoretic. Psychiatric: She has a normal mood and affect. Her behavior is normal.   Nursing note and vitals reviewed. DIAGNOSTIC RESULTS     EKG: All EKG's are interpreted by the Emergency Department Physician who either signs or Co-signs this chart in the absence of a cardiologist.        RADIOLOGY:   Non-plain film images such as CT, Ultrasound and MRI are read by the radiologist. Plain radiographic images are visualized and preliminarily interpreted by the emergency physician with the below findings:        Interpretation per the Radiologist below, if available at the time of this note:    XR CHEST STANDARD (2 VW)   Final Result   Impression:     Infiltrate in the posterior lung base, likely in the left lower lobe.           Electronically Signed By: Marilu Trevino   on 09/23/2017  15:13      CT ABDOMEN PELVIS W IV CONTRAST Additional Contrast? None   Final Result   Impression:     Mild scarring or discoid subsegmental atelectasis in both lung bases. Right is more prominent. Pleural nodule along the left major fissure is stable compared to prior. Hiatal hernia. Fatty liver. Low-density lesions in the liver are stable compared to prior and may represent cysts or hemangiomas. No acute bowel findings. Low lying bladder may be related to bladder prolapse. Few prominent bilateral inguinal lymph nodes are nonspecific. Possibly reactive. Right is more prominent compared to left. Electronically Signed By: Avelino Gold   on  09/23/2017  14:09            ED BEDSIDE ULTRASOUND:   Performed by ED Physician - none    LABS:  Labs Reviewed   CBC WITH AUTO DIFFERENTIAL - Abnormal; Notable for the following:        Result Value    WBC 15.5 (*)     Segs Absolute 11.46 (*)     Absolute Mono # 1.40 (*)     Absolute Bands # 1.40 (*)     All other components within normal limits   COMPREHENSIVE METABOLIC PANEL - Abnormal; Notable for the following:     Glucose 132 (*)     BUN 7 (*)     Chloride 97 (*)     All other components within normal limits   UA W/REFLEX CULTURE - Abnormal; Notable for the following:     Ketones, Urine TRACE (*)     All other components within normal limits   MICROSCOPIC URINALYSIS - Abnormal; Notable for the following:     Bacteria, UA 1+ (*)     Mucus, UA 1+ (*)     All other components within normal limits   PROTIME-INR - Abnormal; Notable for the following:     Protime 21.4 (*)     INR 2.0 (*)     All other components within normal limits   LIPASE       All other labs were within normal range or not returned as of this dictation.     EMERGENCY DEPARTMENT COURSE and DIFFERENTIAL DIAGNOSIS/MDM:   Vitals:    Vitals:    09/23/17 1103 09/23/17 1155 09/23/17 1333 09/23/17 1501   BP: (!) 155/133 126/71 (!) 104/59 (!) 107/56   Pulse: 102  104 95 iopamidol (ISOVUE-300) 61 % injection 100 mL (100 mLs Intravenous Given 9/23/17 1302)   acetaminophen (TYLENOL) tablet 1,000 mg (1,000 mg Oral Given 9/23/17 1324)       New Prescriptions from this visit:    New Prescriptions    CYCLOBENZAPRINE (FLEXERIL) 10 MG TABLET    Take 0.5 tablets by mouth 3 times daily as needed for Muscle spasms       Follow-up:  Chong Kolb MD  [address] [facility] [address]  [phone]    In 2 days  Recheck today's complaints, recheck fever and back pain        Final Impression:   1. Acute bilateral low back pain without sciatica    2. Fever in adult               (Please note that portions of this note were completed with a voice recognition program.  Efforts were made to edit the dictations but occasionally words are mis-transcribed.)      Azra Lopez (electronically signed)  Attending Emergency Physician       [address] [place] [address],   09/23/17 1286

## 2017-09-24 NOTE — ED PROVIDER NOTES
name: N/A    Number of children: N/A    Years of education: N/A     Social History Main Topics    Smoking status: Former Smoker    Smokeless tobacco: None    Alcohol use No    Drug use: No    Sexual activity: Not Asked     Other Topics Concern    None     Social History Narrative       SCREENINGS    [place] Coma Scale  Eye Opening: Spontaneous  Best Verbal Response: Oriented  Best Motor Response: Obeys commands  [place] Coma Scale Score: 15        PHYSICAL EXAM    (up to 7 for level 4, 8 or more for level 5)   ED Triage Vitals   BP Temp Temp Source Pulse Resp SpO2 Height Weight   09/23/17 1737 09/23/17 1737 09/23/17 1737 09/23/17 1736 09/23/17 1736 09/23/17 1736 -- --   162/86 102.8 °F (39.3 °C) Tympanic 111 20 92 %         Physical Exam   Constitutional: She is oriented to person, place, and time. She appears well-developed and well-nourished. She appears ill. She appears distressed (mild secondary to pain). HENT:   Head: Normocephalic and atraumatic. Neck: Normal range of motion. Neck supple. Cardiovascular: Regular rhythm, normal heart sounds and normal pulses. Tachycardia present. Exam reveals no gallop and no friction rub. No murmur heard. Pulmonary/Chest: Effort normal and breath sounds normal. Tachypnea noted. No respiratory distress. She has no wheezes. She has no rales. Abdominal: Soft. There is no tenderness. There is no rebound and no guarding. Musculoskeletal: She exhibits no edema or tenderness. Right knee: No tenderness found. Lumbar back: She exhibits normal range of motion and no tenderness. Well healing surgical scar right knee with no erythema or warmth   Neurological: She is alert and oriented to person, place, and time. Skin: Skin is warm and dry. No rash noted. She is not diaphoretic. No cyanosis. There is pallor. Psychiatric: She has a normal mood and affect. Her behavior is normal.   Nursing note and vitals reviewed.         DIAGNOSTIC RESULTS EKG: All EKG's are interpreted by the Emergency Department Physician who either signs or Co-signs this chart in the absence of a cardiologist.    Sinus tachycardia rate 107 normal qrs normal st and t. Normal except rate    RADIOLOGY:   Non-plain film images such as CT, Ultrasound and MRI are read by the radiologist. Plain radiographic images are visualized and preliminarily interpreted by the emergency physician with the below findings:        Interpretation per the Radiologist below, if available at the time of this note:    No orders to display         ED BEDSIDE ULTRASOUND:   Performed by ED Physician - none    LABS:  Labs Reviewed   BLOOD GAS, VENOUS - Abnormal; Notable for the following:        Result Value    pO2, Vishal 29.2 (*)     O2 Sat, Vishal 55.1 (*)     All other components within normal limits   CULTURE BLOOD #1   CULTURE BLOOD #2   LACTIC ACID, PLASMA       All other labs were within normal range or not returned as of this dictation. EMERGENCY DEPARTMENT COURSE and DIFFERENTIAL DIAGNOSIS/MDM:   Vitals:    Vitals:    09/23/17 1931 09/23/17 1946 09/23/17 2001 09/23/17 2016   BP: 114/73 111/73 123/83 133/74   Pulse: 109 111 110 115   Resp: 30 (!) 42 (!) 40 (!) 46   Temp:       TempSrc:       SpO2: 97% 96% 95% 95%           MDM     I discussed with the patient and the family that I am concerned that she is becoming septic from unkown source of infection. Chest xr and ua were negative from earlier visit today, TKA on right does not appear infected. CT with IV contrast of abdomen/pelvis does not reveal source. I am concerned that an MRI will be necessary with her new onset severe back pain and fever, and MRI is not available here. I spoke to Dr. Liana Shin who agreed patient should be transferred since MRI not available. Patient wishes to be transferred to the [facility] for availability of her orthopedic surgeon if necessary.   Family and patient kept informed during stay, all questions were answered. Vanc and zosyn given empirically. She is receiving 1 L fluid bolus and then fluids at 125/h. Tylenol is due, which is also ordered. Pain medications have made the patient mildly hypoxic and oxygen administered. Patient has history of DVT, but therapeutic INR today. I spoke to Dr. Mickey Douglas who agreed to accept patient to [facility].        CONSULTS:  None    PROCEDURES:  Unless otherwise noted below, none     Procedures    FINAL IMPRESSION      1. Fever in adult    2. Leukocytosis, unspecified type    3. Sepsis, due to unspecified organism ([address]    4. Acute bilateral low back pain without sciatica          DISPOSITION/PLAN   DISPOSITION Decision to Transfer    PATIENT REFERRED TO:  No follow-up provider specified. DISCHARGE MEDICATIONS:  New Prescriptions    No medications on file              Summation      Patient Course:  New onset back pain with fever, becoming septic. No source identified, transfer to the [facility] for availability of MRI and patient preference. ED Medications administered this visit:    Medications   0.9 % sodium chloride infusion ( Intravenous New Bag 9/23/17 2027)   vancomycin 1000 mg IVPB in 250 mL D5W addavial (0 mg Intravenous Stopped 9/23/17 2028)   piperacillin-tazobactam (ZOSYN) 3.375 g in dextrose 5 % 50 mL IVPB (mini-bag) (0 g Intravenous Stopped 9/23/17 1913)   0.9 % NaCl bolus (0 mLs Intravenous Stopped 9/23/17 2027)   fentaNYL (SUBLIMAZE) injection 25 mcg (25 mcg Intravenous Given 9/23/17 1841)   acetaminophen (TYLENOL) tablet 1,000 mg (1,000 mg Oral Given 9/23/17 2028)   ondansetron (ZOFRAN) injection 4 mg (4 mg Intravenous Given 9/23/17 2027)       New Prescriptions from this visit:    New Prescriptions    No medications on file       Follow-up:  No follow-up provider specified. Final Impression:   1. Fever in adult    2. Leukocytosis, unspecified type    3. Sepsis, due to unspecified organism ([address]    4.  Acute bilateral low back pain

## 2017-09-25 LAB
EKG ATRIAL RATE: 107 BPM
EKG P AXIS: 46 DEGREES
EKG P-R INTERVAL: 178 MS
EKG Q-T INTERVAL: 334 MS
EKG QRS DURATION: 82 MS
EKG QTC CALCULATION (BAZETT): 445 MS
EKG R AXIS: 12 DEGREES
EKG T AXIS: 20 DEGREES
EKG VENTRICULAR RATE: 107 BPM

## 2017-09-26 ENCOUNTER — TELEPHONE (OUTPATIENT)
Dept: PHARMACY | Facility: CLINIC | Age: 77
End: 2017-09-26

## 2017-09-26 ENCOUNTER — CARE COORDINATION (OUTPATIENT)
Dept: CARE COORDINATION | Age: 77
End: 2017-09-26

## 2017-09-26 LAB
CULTURE: ABNORMAL
Lab: ABNORMAL
ORGANISM: ABNORMAL
SPECIMEN DESCRIPTION: ABNORMAL
STATUS: ABNORMAL
STATUS: ABNORMAL

## 2017-09-26 NOTE — CARE COORDINATION
TC to [facility]. Per , pt still admitted.       [facility] Coordination   [phone]

## 2017-10-02 NOTE — CARE COORDINATION
TC to [facility]. Per , pt still admitted.       [facility] Coordination   [phone]

## 2017-10-05 NOTE — CARE COORDINATION
TC to [facility]. Per , pt still admitted.       [facility] Coordination   [phone]

## 2017-10-12 NOTE — CARE COORDINATION
TC to [facility]. Per , pt still admitted.       [facility] Coordination   [phone]

## 2017-10-13 ENCOUNTER — TELEPHONE (OUTPATIENT)
Dept: PHARMACY | Facility: CLINIC | Age: 77
End: 2017-10-13

## 2017-10-13 NOTE — TELEPHONE ENCOUNTER
CLINICAL PHARMACY NOTE - Adherence Review    Identified care gap per [employer]: rosuvastatin adherence  Per records, appears 90-day supply last filled on 10/12/2017  Additional notes:  Appears patient may be hospitalized at this time    No patient outreach planned at this time.     Agnes Crowe, PharmD  Clinical Pharmacy Specialist  O: [phone]  C: [address]

## 2017-10-19 ENCOUNTER — HOSPITAL ENCOUNTER (OUTPATIENT)
Age: 77
Setting detail: SPECIMEN
Discharge: HOME | End: 2017-10-19
Payer: MEDICARE

## 2017-10-19 LAB
ALBUMIN SERPL-MCNC: 3.4 G/DL (ref 3.5–5.2)
ALBUMIN/GLOBULIN RATIO: 1 (ref 1–2.5)
ALP BLD-CCNC: 70 U/L (ref 35–104)
ALT SERPL-CCNC: 15 U/L (ref 5–33)
ANION GAP SERPL CALCULATED.3IONS-SCNC: 12 MMOL/L (ref 9–17)
AST SERPL-CCNC: 21 U/L
BILIRUB SERPL-MCNC: 0.23 MG/DL (ref 0.3–1.2)
BUN BLDV-MCNC: 6 MG/DL (ref 8–23)
BUN/CREAT BLD: 10 (ref 9–20)
CALCIUM SERPL-MCNC: 9.1 MG/DL (ref 8.6–10.4)
CHLORIDE BLD-SCNC: 103 MMOL/L (ref 98–107)
CO2: 26 MMOL/L (ref 20–31)
CREAT SERPL-MCNC: 0.61 MG/DL (ref 0.5–0.9)
GFR AFRICAN AMERICAN: >60 ML/MIN
GFR NON-AFRICAN AMERICAN: >60 ML/MIN
GFR SERPL CREATININE-BSD FRML MDRD: ABNORMAL ML/MIN/{1.73_M2}
GFR SERPL CREATININE-BSD FRML MDRD: ABNORMAL ML/MIN/{1.73_M2}
GLUCOSE BLD-MCNC: 161 MG/DL (ref 70–99)
HAV IGM SER IA-ACNC: NONREACTIVE
HCT VFR BLD CALC: 30.1 % (ref 36–46)
HEMOGLOBIN: 9.8 G/DL (ref 12–16)
HEPATITIS B CORE IGM ANTIBODY: NONREACTIVE
HEPATITIS B SURFACE ANTIGEN: NONREACTIVE
HEPATITIS C ANTIBODY: NONREACTIVE
INR BLD: 1.1 (ref 0.9–1.2)
MCH RBC QN AUTO: 28.2 PG (ref 26–34)
MCHC RBC AUTO-ENTMCNC: 32.4 G/DL (ref 31–37)
MCV RBC AUTO: 87.1 FL (ref 80–100)
PDW BLD-RTO: 15.5 % (ref 12.1–15.2)
PLATELET # BLD: 378 K/UL (ref 140–450)
PMV BLD AUTO: 8.3 FL (ref 6–12)
POTASSIUM SERPL-SCNC: 3 MMOL/L (ref 3.7–5.3)
PROTHROMBIN TIME: 11.4 SEC (ref 9.7–12.2)
RBC # BLD: 3.46 M/UL (ref 4–5.2)
SODIUM BLD-SCNC: 141 MMOL/L (ref 135–144)
TOTAL PROTEIN: 6.9 G/DL (ref 6.4–8.3)
VANCOMYCIN PEAK DATE LAST DOSE: NORMAL
VANCOMYCIN PEAK DOSE AMOUNT: NORMAL
VANCOMYCIN PEAK TIME LAST DOSE: NORMAL
VANCOMYCIN PEAK: 39.9 UG/ML (ref 30–40)
VANCOMYCIN RANDOM DATE LAST DOSE: NORMAL
VANCOMYCIN RANDOM DOSE AMOUNT: NORMAL
VANCOMYCIN RANDOM TIME LAST DOSE: NORMAL
VANCOMYCIN RANDOM: 12.8 UG/ML
WBC # BLD: 4.7 K/UL (ref 3.5–11)

## 2017-10-19 PROCEDURE — 80053 COMPREHEN METABOLIC PANEL: CPT

## 2017-10-19 PROCEDURE — 85610 PROTHROMBIN TIME: CPT

## 2017-10-19 PROCEDURE — 80202 ASSAY OF VANCOMYCIN: CPT

## 2017-10-19 PROCEDURE — 82248 BILIRUBIN DIRECT: CPT

## 2017-10-19 PROCEDURE — 80074 ACUTE HEPATITIS PANEL: CPT

## 2017-10-19 PROCEDURE — 85027 COMPLETE CBC AUTOMATED: CPT

## 2017-10-20 LAB — BILIRUBIN DIRECT: <0.08 MG/DL

## 2017-10-23 ENCOUNTER — HOSPITAL ENCOUNTER (OUTPATIENT)
Age: 77
Setting detail: SPECIMEN
Discharge: HOME | End: 2017-10-23
Payer: MEDICARE

## 2017-10-23 LAB
ALBUMIN SERPL-MCNC: 3.3 G/DL (ref 3.5–5.2)
ALBUMIN/GLOBULIN RATIO: 1 (ref 1–2.5)
ALP BLD-CCNC: 65 U/L (ref 35–104)
ALT SERPL-CCNC: 13 U/L (ref 5–33)
ANION GAP SERPL CALCULATED.3IONS-SCNC: 21 MMOL/L
AST SERPL-CCNC: 17 U/L
BILIRUB SERPL-MCNC: 0.15 MG/DL (ref 0.3–1.2)
BILIRUBIN DIRECT: 0.2 MG/DL
BILIRUBIN, INDIRECT: ABNORMAL MG/DL (ref 0–1)
BUN BLDV-MCNC: 5 MG/DL (ref 8–23)
CALCIUM SERPL-MCNC: 9 MG/DL (ref 8.6–10.4)
CHLORIDE BLD-SCNC: 106 MMOL/L (ref 98–107)
CO2: 17 MMOL/L (ref 20–31)
CREAT SERPL-MCNC: 0.68 MG/DL (ref 0.5–0.9)
GFR AFRICAN AMERICAN: >60 ML/MIN
GFR NON-AFRICAN AMERICAN: >60 ML/MIN
GFR SERPL CREATININE-BSD FRML MDRD: ABNORMAL ML/MIN/{1.73_M2}
GFR SERPL CREATININE-BSD FRML MDRD: ABNORMAL ML/MIN/{1.73_M2}
GLUCOSE BLD-MCNC: 102 MG/DL (ref 70–99)
HCT VFR BLD CALC: 28.4 % (ref 36–46)
HEMOGLOBIN: 9.4 G/DL (ref 12–16)
INR BLD: 2.2 (ref 0.9–1.2)
MCH RBC QN AUTO: 28.4 PG (ref 26–34)
MCHC RBC AUTO-ENTMCNC: 33.2 G/DL (ref 31–37)
MCV RBC AUTO: 85.6 FL (ref 80–100)
PDW BLD-RTO: 15.3 % (ref 12.1–15.2)
PLATELET # BLD: 427 K/UL (ref 140–450)
PMV BLD AUTO: 7.6 FL (ref 6–12)
POTASSIUM SERPL-SCNC: 3.5 MMOL/L (ref 3.7–5.3)
PROTHROMBIN TIME: 23.5 SEC (ref 9.7–12.2)
RBC # BLD: 3.32 M/UL (ref 4–5.2)
SODIUM BLD-SCNC: 144 MMOL/L (ref 135–144)
TOTAL PROTEIN: 6.6 G/DL (ref 6.4–8.3)
VANCOMYCIN TROUGH DATE LAST DOSE: NORMAL
VANCOMYCIN TROUGH DOSE AMOUNT: NORMAL
VANCOMYCIN TROUGH TIME LAST DOSE: NORMAL
VANCOMYCIN TROUGH: 16 UG/ML (ref 10–20)
WBC # BLD: 6.5 K/UL (ref 3.5–11)

## 2017-10-23 PROCEDURE — 85027 COMPLETE CBC AUTOMATED: CPT

## 2017-10-23 PROCEDURE — 80202 ASSAY OF VANCOMYCIN: CPT

## 2017-10-23 PROCEDURE — 82248 BILIRUBIN DIRECT: CPT

## 2017-10-23 PROCEDURE — 85610 PROTHROMBIN TIME: CPT

## 2017-10-23 PROCEDURE — 80053 COMPREHEN METABOLIC PANEL: CPT

## 2017-10-25 ENCOUNTER — HOSPITAL ENCOUNTER (OUTPATIENT)
Dept: INTERVENTIONAL RADIOLOGY/VASCULAR | Age: 77
Discharge: HOME | End: 2017-10-25
Payer: MEDICARE

## 2017-10-25 VITALS
TEMPERATURE: 98.4 F | RESPIRATION RATE: 16 BRPM | OXYGEN SATURATION: 95 % | SYSTOLIC BLOOD PRESSURE: 164 MMHG | HEART RATE: 95 BPM | DIASTOLIC BLOOD PRESSURE: 83 MMHG

## 2017-10-25 DIAGNOSIS — A49.02 MRSA INFECTION: ICD-10-CM

## 2017-10-25 PROCEDURE — C1751 CATH, INF, PER/CENT/MIDLINE: HCPCS

## 2017-10-25 PROCEDURE — 36584 COMPL RPLCMT PICC RS&I: CPT | Performed by: RADIOLOGY

## 2017-10-25 PROCEDURE — 77001 FLUOROGUIDE FOR VEIN DEVICE: CPT | Performed by: RADIOLOGY

## 2017-10-30 ENCOUNTER — HOSPITAL ENCOUNTER (OUTPATIENT)
Age: 77
Setting detail: SPECIMEN
Discharge: HOME | End: 2017-10-30
Payer: MEDICARE

## 2017-10-30 ENCOUNTER — ANTI-COAG VISIT (OUTPATIENT)
Dept: PHARMACY | Age: 77
End: 2017-10-30

## 2017-10-30 DIAGNOSIS — Z79.01 LONG TERM CURRENT USE OF ANTICOAGULANT THERAPY: ICD-10-CM

## 2017-10-30 LAB
ABSOLUTE EOS #: 0.3 K/UL (ref 0–0.4)
ABSOLUTE IMMATURE GRANULOCYTE: ABNORMAL K/UL (ref 0–0.3)
ABSOLUTE LYMPH #: 0.9 K/UL (ref 1–4.8)
ABSOLUTE MONO #: 0.4 K/UL (ref 0–1)
ALBUMIN SERPL-MCNC: 3.3 G/DL (ref 3.5–5.2)
ALBUMIN/GLOBULIN RATIO: 1 (ref 1–2.5)
ALP BLD-CCNC: 72 U/L (ref 35–104)
ALT SERPL-CCNC: 8 U/L (ref 5–33)
ANION GAP SERPL CALCULATED.3IONS-SCNC: 13 MMOL/L (ref 9–17)
AST SERPL-CCNC: 17 U/L
BASOPHILS # BLD: 1 %
BASOPHILS ABSOLUTE: 0 K/UL (ref 0–0.2)
BILIRUB SERPL-MCNC: <0.1 MG/DL (ref 0.3–1.2)
BILIRUBIN DIRECT: <0.08 MG/DL
BILIRUBIN, INDIRECT: ABNORMAL MG/DL (ref 0–1)
BUN BLDV-MCNC: 8 MG/DL (ref 8–23)
CALCIUM SERPL-MCNC: 8.7 MG/DL (ref 8.6–10.4)
CHLORIDE BLD-SCNC: 103 MMOL/L (ref 98–107)
CO2: 25 MMOL/L (ref 20–31)
CREAT SERPL-MCNC: 0.85 MG/DL (ref 0.5–0.9)
DIFFERENTIAL TYPE: ABNORMAL
EOSINOPHILS RELATIVE PERCENT: 6 %
GFR AFRICAN AMERICAN: >60 ML/MIN
GFR NON-AFRICAN AMERICAN: >60 ML/MIN
GFR SERPL CREATININE-BSD FRML MDRD: ABNORMAL ML/MIN/{1.73_M2}
GFR SERPL CREATININE-BSD FRML MDRD: ABNORMAL ML/MIN/{1.73_M2}
GLUCOSE BLD-MCNC: 118 MG/DL (ref 70–99)
HCT VFR BLD CALC: 30.4 % (ref 36–46)
HEMOGLOBIN: 9.8 G/DL (ref 12–16)
IMMATURE GRANULOCYTES: ABNORMAL %
INR BLD: 6.2 (ref 0.9–1.2)
LYMPHOCYTES # BLD: 15 %
MCH RBC QN AUTO: 27.6 PG (ref 26–34)
MCHC RBC AUTO-ENTMCNC: 32.3 G/DL (ref 31–37)
MCV RBC AUTO: 85.3 FL (ref 80–100)
MONOCYTES # BLD: 7 %
PDW BLD-RTO: 15.4 % (ref 12.1–15.2)
PLATELET # BLD: 385 K/UL (ref 140–450)
PLATELET ESTIMATE: ABNORMAL
PMV BLD AUTO: 8 FL (ref 6–12)
POTASSIUM SERPL-SCNC: 3.1 MMOL/L (ref 3.7–5.3)
PROTHROMBIN TIME: 72.3 SEC (ref 9.7–12.2)
RBC # BLD: 3.57 M/UL (ref 4–5.2)
RBC # BLD: ABNORMAL 10*6/UL
SEG NEUTROPHILS: 71 %
SEGMENTED NEUTROPHILS ABSOLUTE COUNT: 4 K/UL (ref 1.8–7.7)
SODIUM BLD-SCNC: 141 MMOL/L (ref 135–144)
TOTAL PROTEIN: 6.6 G/DL (ref 6.4–8.3)
VANCOMYCIN TROUGH DATE LAST DOSE: NORMAL
VANCOMYCIN TROUGH DOSE AMOUNT: NORMAL
VANCOMYCIN TROUGH TIME LAST DOSE: NORMAL
VANCOMYCIN TROUGH: 19.7 UG/ML (ref 10–20)
WBC # BLD: 5.6 K/UL (ref 3.5–11)
WBC # BLD: ABNORMAL 10*3/UL

## 2017-10-30 PROCEDURE — 85025 COMPLETE CBC W/AUTO DIFF WBC: CPT

## 2017-10-30 PROCEDURE — 82248 BILIRUBIN DIRECT: CPT

## 2017-10-30 PROCEDURE — 85610 PROTHROMBIN TIME: CPT

## 2017-10-30 PROCEDURE — 80053 COMPREHEN METABOLIC PANEL: CPT

## 2017-10-30 PROCEDURE — 80202 ASSAY OF VANCOMYCIN: CPT

## 2017-10-30 NOTE — PROGRESS NOTES
Sergio Johnson at Dr. Yasemin Smith office contacted clinic via telephone with request for patient to be seen in clinic tomorrow 10/31/17. Per Sergio Johnson, patient has been receiving IV antibiotic therapy ([facility] - Dr. Yasemin Smith dosing warfarin) and INR was supra-therapeutic today at 6.2. Patient instructed to hold warfarin today 10/30/17 and recheck INR in clinic 10/31/17. Spoke to patient who stated she is able to make it to clinic 10/31/17 at 12:45 pm.  Chao Snell at Dr. Yasemin Smith office to relay above appointment time and date.

## 2017-10-31 ENCOUNTER — HOSPITAL ENCOUNTER (OUTPATIENT)
Dept: PHARMACY | Age: 77
Setting detail: THERAPIES SERIES
Discharge: HOME | End: 2017-10-31
Payer: MEDICARE

## 2017-10-31 VITALS — BODY MASS INDEX: 33.65 KG/M2 | WEIGHT: 184 LBS

## 2017-10-31 DIAGNOSIS — Z79.01 LONG TERM CURRENT USE OF ANTICOAGULANT THERAPY: ICD-10-CM

## 2017-10-31 LAB — INR BLD: 6.3

## 2017-10-31 PROCEDURE — 85610 PROTHROMBIN TIME: CPT

## 2017-10-31 PROCEDURE — 99211 OFF/OP EST MAY X REQ PHY/QHP: CPT

## 2017-10-31 NOTE — PROGRESS NOTES
Patient states she had knee surgery at the end of August.  A few weeks after that she contracted horrible back pain. 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 She is receiving IV vancomycin through November 14. INR on 10/23 was 2.2. Patient took fluconazole 150 mg po daily x 3 days on 10/24, 10/25, and 10/26. Fluconazole has a long half life and may linger in the body & increase INR. The patient states she has not had much of an appetite for a while now. Will have patient hold warfarin for 2 days and return to clinic for an INR check on Thursday. Patient states no visible blood in urine and no black tarry stool.

## 2017-10-31 NOTE — PATIENT INSTRUCTIONS
Please do not take your warfarin today and tomorrow. Continue to monitor urine and stool. Continue to monitor for signs of bleeding. Return to clinic on Thursday.

## 2017-11-01 ENCOUNTER — TELEPHONE (OUTPATIENT)
Dept: INFECTIOUS DISEASES | Age: 77
End: 2017-11-01

## 2017-11-01 NOTE — TELEPHONE ENCOUNTER
I spoke with Ayse Macias and informed her of what Dr. Arielle David has typed. She said that they already had orders from Dr. Annemarie Whittaker for the same thing and patient's new dose was shipped out to her already.

## 2017-11-02 ENCOUNTER — HOSPITAL ENCOUNTER (OUTPATIENT)
Dept: PHARMACY | Age: 77
Setting detail: THERAPIES SERIES
Discharge: HOME | End: 2017-11-02
Payer: MEDICARE

## 2017-11-02 VITALS
BODY MASS INDEX: 34.02 KG/M2 | SYSTOLIC BLOOD PRESSURE: 136 MMHG | HEART RATE: 67 BPM | DIASTOLIC BLOOD PRESSURE: 70 MMHG | WEIGHT: 186 LBS

## 2017-11-02 DIAGNOSIS — Z79.01 LONG TERM CURRENT USE OF ANTICOAGULANT THERAPY: ICD-10-CM

## 2017-11-02 LAB — INR BLD: 3.9

## 2017-11-02 PROCEDURE — 85610 PROTHROMBIN TIME: CPT

## 2017-11-02 PROCEDURE — 99211 OFF/OP EST MAY X REQ PHY/QHP: CPT

## 2017-11-02 NOTE — PATIENT INSTRUCTIONS
Continue to monitor urine and stool. Continue to monitor for signs of bleeding. Return to clinic in 1 week. Hold warfarin today and tomorrow and then resume warfarin with HALF TABLET dose of 3.75 mg daily.

## 2017-11-04 ENCOUNTER — APPOINTMENT (OUTPATIENT)
Dept: CT IMAGING | Age: 77
End: 2017-11-04
Payer: MEDICARE

## 2017-11-04 ENCOUNTER — HOSPITAL ENCOUNTER (EMERGENCY)
Age: 77
Discharge: HOME | End: 2017-11-04
Payer: MEDICARE

## 2017-11-04 VITALS
SYSTOLIC BLOOD PRESSURE: 153 MMHG | OXYGEN SATURATION: 95 % | TEMPERATURE: 97.8 F | HEART RATE: 64 BPM | RESPIRATION RATE: 16 BRPM | DIASTOLIC BLOOD PRESSURE: 88 MMHG

## 2017-11-04 DIAGNOSIS — M25.551 RIGHT HIP PAIN: Primary | ICD-10-CM

## 2017-11-04 DIAGNOSIS — M70.71 BURSITIS OF RIGHT HIP, UNSPECIFIED BURSA: ICD-10-CM

## 2017-11-04 LAB
-: NORMAL
ABSOLUTE EOS #: 0.5 K/UL (ref 0–0.4)
ABSOLUTE IMMATURE GRANULOCYTE: ABNORMAL K/UL (ref 0–0.3)
ABSOLUTE LYMPH #: 1.2 K/UL (ref 1–4.8)
ABSOLUTE MONO #: 0.5 K/UL (ref 0–1)
AMORPHOUS: NORMAL
ANION GAP SERPL CALCULATED.3IONS-SCNC: 15 MMOL/L (ref 9–17)
BACTERIA: NORMAL
BASOPHILS # BLD: 1 %
BASOPHILS ABSOLUTE: 0.1 K/UL (ref 0–0.2)
BILIRUBIN URINE: NEGATIVE
BUN BLDV-MCNC: 8 MG/DL (ref 8–23)
BUN/CREAT BLD: 12 (ref 9–20)
CALCIUM SERPL-MCNC: 9.3 MG/DL (ref 8.6–10.4)
CASTS UA: NORMAL /LPF
CHLORIDE BLD-SCNC: 103 MMOL/L (ref 98–107)
CO2: 23 MMOL/L (ref 20–31)
COLOR: YELLOW
COMMENT UA: NORMAL
CREAT SERPL-MCNC: 0.69 MG/DL (ref 0.5–0.9)
CRYSTALS, UA: NORMAL /HPF
DIFFERENTIAL TYPE: ABNORMAL
EOSINOPHILS RELATIVE PERCENT: 6 %
EPITHELIAL CELLS UA: NORMAL /HPF (ref 0–25)
GFR AFRICAN AMERICAN: >60 ML/MIN
GFR NON-AFRICAN AMERICAN: >60 ML/MIN
GFR SERPL CREATININE-BSD FRML MDRD: ABNORMAL ML/MIN/{1.73_M2}
GFR SERPL CREATININE-BSD FRML MDRD: ABNORMAL ML/MIN/{1.73_M2}
GLUCOSE BLD-MCNC: 103 MG/DL (ref 70–99)
GLUCOSE URINE: NEGATIVE
HCT VFR BLD CALC: 35.3 % (ref 36–46)
HEMOGLOBIN: 11.4 G/DL (ref 12–16)
IMMATURE GRANULOCYTES: ABNORMAL %
INR BLD: 1.3 (ref 0.9–1.2)
KETONES, URINE: NEGATIVE
LACTIC ACID, WHOLE BLOOD: NORMAL MMOL/L (ref 0.7–2.1)
LACTIC ACID: 1 MMOL/L (ref 0.5–2.2)
LEUKOCYTE ESTERASE, URINE: NEGATIVE
LYMPHOCYTES # BLD: 16 %
MCH RBC QN AUTO: 27.2 PG (ref 26–34)
MCHC RBC AUTO-ENTMCNC: 32.2 G/DL (ref 31–37)
MCV RBC AUTO: 84.4 FL (ref 80–100)
MONOCYTES # BLD: 7 %
MUCUS: NORMAL
NITRITE, URINE: NEGATIVE
OTHER OBSERVATIONS UA: NORMAL
PDW BLD-RTO: 15.2 % (ref 12.1–15.2)
PH UA: 6.5 (ref 5–9)
PLATELET # BLD: 458 K/UL (ref 140–450)
PLATELET ESTIMATE: ABNORMAL
PMV BLD AUTO: 7 FL (ref 6–12)
POTASSIUM SERPL-SCNC: 3.2 MMOL/L (ref 3.7–5.3)
PROTEIN UA: NEGATIVE
PROTHROMBIN TIME: 13.3 SEC (ref 9.7–12.2)
RBC # BLD: 4.18 M/UL (ref 4–5.2)
RBC # BLD: ABNORMAL 10*6/UL
RBC UA: NORMAL /HPF (ref 0–2)
RENAL EPITHELIAL, UA: NORMAL /HPF
SEG NEUTROPHILS: 70 %
SEGMENTED NEUTROPHILS ABSOLUTE COUNT: 5.5 K/UL (ref 1.8–7.7)
SODIUM BLD-SCNC: 141 MMOL/L (ref 135–144)
SPECIFIC GRAVITY UA: 1.01 (ref 1.01–1.02)
TRICHOMONAS: NORMAL
TURBIDITY: CLEAR
URINE HGB: NEGATIVE
UROBILINOGEN, URINE: NORMAL
WBC # BLD: 7.7 K/UL (ref 3.5–11)
WBC # BLD: ABNORMAL 10*3/UL
WBC UA: NORMAL /HPF (ref 0–5)
YEAST: NORMAL

## 2017-11-04 PROCEDURE — 6360000004 HC RX CONTRAST MEDICATION: Performed by: PHYSICIAN ASSISTANT

## 2017-11-04 PROCEDURE — 85610 PROTHROMBIN TIME: CPT

## 2017-11-04 PROCEDURE — 99284 EMERGENCY DEPT VISIT MOD MDM: CPT

## 2017-11-04 PROCEDURE — 96375 TX/PRO/DX INJ NEW DRUG ADDON: CPT

## 2017-11-04 PROCEDURE — 81001 URINALYSIS AUTO W/SCOPE: CPT

## 2017-11-04 PROCEDURE — 72193 CT PELVIS W/DYE: CPT

## 2017-11-04 PROCEDURE — 36415 COLL VENOUS BLD VENIPUNCTURE: CPT

## 2017-11-04 PROCEDURE — 80048 BASIC METABOLIC PNL TOTAL CA: CPT

## 2017-11-04 PROCEDURE — 6360000002 HC RX W HCPCS: Performed by: PHYSICIAN ASSISTANT

## 2017-11-04 PROCEDURE — 6370000000 HC RX 637 (ALT 250 FOR IP): Performed by: PHYSICIAN ASSISTANT

## 2017-11-04 PROCEDURE — 96374 THER/PROPH/DIAG INJ IV PUSH: CPT

## 2017-11-04 PROCEDURE — 83605 ASSAY OF LACTIC ACID: CPT

## 2017-11-04 PROCEDURE — 85025 COMPLETE CBC W/AUTO DIFF WBC: CPT

## 2017-11-04 RX ORDER — FENTANYL CITRATE 50 UG/ML
50 INJECTION, SOLUTION INTRAMUSCULAR; INTRAVENOUS ONCE
Status: DISCONTINUED | OUTPATIENT
Start: 2017-11-04 | End: 2017-11-04

## 2017-11-04 RX ORDER — MORPHINE SULFATE 10 MG/ML
4 INJECTION, SOLUTION INTRAMUSCULAR; INTRAVENOUS ONCE
Status: COMPLETED | OUTPATIENT
Start: 2017-11-04 | End: 2017-11-04

## 2017-11-04 RX ORDER — FENTANYL CITRATE 50 UG/ML
25 INJECTION, SOLUTION INTRAMUSCULAR; INTRAVENOUS ONCE
Status: COMPLETED | OUTPATIENT
Start: 2017-11-04 | End: 2017-11-04

## 2017-11-04 RX ORDER — 0.9 % SODIUM CHLORIDE 0.9 %
500 INTRAVENOUS SOLUTION INTRAVENOUS ONCE
Status: DISCONTINUED | OUTPATIENT
Start: 2017-11-04 | End: 2017-11-04 | Stop reason: HOSPADM

## 2017-11-04 RX ORDER — ONDANSETRON 2 MG/ML
4 INJECTION INTRAMUSCULAR; INTRAVENOUS ONCE
Status: COMPLETED | OUTPATIENT
Start: 2017-11-04 | End: 2017-11-04

## 2017-11-04 RX ORDER — DOCUSATE SODIUM 100 MG/1
100 CAPSULE, LIQUID FILLED ORAL DAILY PRN
COMMUNITY
End: 2019-03-05

## 2017-11-04 RX ADMIN — FENTANYL CITRATE 25 MCG: 50 INJECTION INTRAMUSCULAR; INTRAVENOUS at 20:34

## 2017-11-04 RX ADMIN — ONDANSETRON 4 MG: 2 INJECTION INTRAMUSCULAR; INTRAVENOUS at 18:19

## 2017-11-04 RX ADMIN — IOPAMIDOL 100 ML: 612 INJECTION, SOLUTION INTRAVENOUS at 19:24

## 2017-11-04 RX ADMIN — MORPHINE SULFATE 4 MG: 10 INJECTION, SOLUTION INTRAMUSCULAR; INTRAVENOUS at 18:19

## 2017-11-04 ASSESSMENT — ENCOUNTER SYMPTOMS
BLOOD IN STOOL: 0
NAUSEA: 0
WHEEZING: 0
BACK PAIN: 0
SORE THROAT: 0
EYE DISCHARGE: 0
VOMITING: 0
RHINORRHEA: 0
EYE REDNESS: 0
CONSTIPATION: 0
COUGH: 0
ABDOMINAL PAIN: 0
SHORTNESS OF BREATH: 0
DIARRHEA: 0
CHEST TIGHTNESS: 0

## 2017-11-04 ASSESSMENT — PAIN DESCRIPTION - PAIN TYPE: TYPE: ACUTE PAIN

## 2017-11-04 ASSESSMENT — PAIN - FUNCTIONAL ASSESSMENT: PAIN_FUNCTIONAL_ASSESSMENT: 0-10

## 2017-11-04 ASSESSMENT — PAIN SCALES - GENERAL
PAINLEVEL_OUTOF10: 6
PAINLEVEL_OUTOF10: 7
PAINLEVEL_OUTOF10: 4
PAINLEVEL_OUTOF10: 9
PAINLEVEL_OUTOF10: 3

## 2017-11-04 ASSESSMENT — PAIN DESCRIPTION - LOCATION
LOCATION: HIP
LOCATION: HIP

## 2017-11-04 ASSESSMENT — PAIN DESCRIPTION - ORIENTATION
ORIENTATION: RIGHT
ORIENTATION: RIGHT

## 2017-11-05 NOTE — ED PROVIDER NOTES
1819)   ondansetron (ZOFRAN) injection 4 mg (4 mg Intravenous Given 11/4/17 1819)   iopamidol (ISOVUE-300) 61 % injection 100 mL (100 mLs Intravenous Given 11/4/17 1924)   fentaNYL (SUBLIMAZE) injection 25 mcg (25 mcg Intravenous Given 11/4/17 2034)       New Prescriptions from this visit:    New Prescriptions    No medications on file       Follow-up:  [facility]  [address] [facility]  [phone]    If symptoms worsen, As needed    Boby Lopez MD  [address] [place] [address]  [phone]    Schedule an appointment as soon as possible for a visit in 2 days  for recheck        Final Impression:   1. Right hip pain    2.  Bursitis of right hip, unspecified bursa               (Please note that portions of this note were completed with a voice recognition program.  Efforts were made to edit the dictations but occasionally words are mis-transcribed.)            Edward Lo PA-C  11/04/17 0789

## 2017-11-06 ENCOUNTER — HOSPITAL ENCOUNTER (OUTPATIENT)
Dept: PHARMACY | Age: 77
Setting detail: THERAPIES SERIES
Discharge: HOME | End: 2017-11-06
Payer: MEDICARE

## 2017-11-06 ENCOUNTER — HOSPITAL ENCOUNTER (OUTPATIENT)
Age: 77
Setting detail: SPECIMEN
Discharge: HOME | End: 2017-11-06
Payer: MEDICARE

## 2017-11-06 ENCOUNTER — CARE COORDINATION (OUTPATIENT)
Dept: CARE COORDINATION | Age: 77
End: 2017-11-06

## 2017-11-06 DIAGNOSIS — Z79.01 LONG TERM CURRENT USE OF ANTICOAGULANT THERAPY: ICD-10-CM

## 2017-11-06 LAB
ABSOLUTE EOS #: 0.4 K/UL (ref 0–0.4)
ABSOLUTE IMMATURE GRANULOCYTE: ABNORMAL K/UL (ref 0–0.3)
ABSOLUTE LYMPH #: 1 K/UL (ref 1–4.8)
ABSOLUTE MONO #: 0.4 K/UL (ref 0–1)
ALBUMIN SERPL-MCNC: 3.6 G/DL (ref 3.5–5.2)
ALBUMIN SERPL-MCNC: 3.6 G/DL (ref 3.5–5.2)
ALBUMIN/GLOBULIN RATIO: 1.2 (ref 1–2.5)
ALBUMIN/GLOBULIN RATIO: 1.3 (ref 1–2.5)
ALP BLD-CCNC: 77 U/L (ref 35–104)
ALP BLD-CCNC: 77 U/L (ref 35–104)
ALT SERPL-CCNC: 10 U/L (ref 5–33)
ALT SERPL-CCNC: 9 U/L (ref 5–33)
ANION GAP SERPL CALCULATED.3IONS-SCNC: 15 MMOL/L (ref 9–17)
AST SERPL-CCNC: 14 U/L
AST SERPL-CCNC: 14 U/L
BASOPHILS # BLD: 1 %
BASOPHILS ABSOLUTE: 0 K/UL (ref 0–0.2)
BILIRUB SERPL-MCNC: 0.2 MG/DL (ref 0.3–1.2)
BILIRUB SERPL-MCNC: 0.22 MG/DL (ref 0.3–1.2)
BILIRUBIN DIRECT: <0.08 MG/DL
BILIRUBIN, INDIRECT: ABNORMAL MG/DL (ref 0–1)
BUN BLDV-MCNC: 9 MG/DL (ref 8–23)
BUN/CREAT BLD: 12 (ref 9–20)
CALCIUM SERPL-MCNC: 9 MG/DL (ref 8.6–10.4)
CHLORIDE BLD-SCNC: 104 MMOL/L (ref 98–107)
CO2: 23 MMOL/L (ref 20–31)
CREAT SERPL-MCNC: 0.74 MG/DL (ref 0.5–0.9)
CREAT SERPL-MCNC: 0.74 MG/DL (ref 0.5–0.9)
DIFFERENTIAL TYPE: ABNORMAL
EOSINOPHILS RELATIVE PERCENT: 6 %
GFR AFRICAN AMERICAN: >60 ML/MIN
GFR AFRICAN AMERICAN: >60 ML/MIN
GFR NON-AFRICAN AMERICAN: >60 ML/MIN
GFR NON-AFRICAN AMERICAN: >60 ML/MIN
GFR SERPL CREATININE-BSD FRML MDRD: ABNORMAL ML/MIN/{1.73_M2}
GFR SERPL CREATININE-BSD FRML MDRD: ABNORMAL ML/MIN/{1.73_M2}
GFR SERPL CREATININE-BSD FRML MDRD: NORMAL ML/MIN/{1.73_M2}
GFR SERPL CREATININE-BSD FRML MDRD: NORMAL ML/MIN/{1.73_M2}
GLOBULIN: ABNORMAL G/DL (ref 1.5–3.8)
GLUCOSE BLD-MCNC: 109 MG/DL (ref 70–99)
HCT VFR BLD CALC: 33.1 % (ref 36–46)
HEMOGLOBIN: 10.5 G/DL (ref 12–16)
IMMATURE GRANULOCYTES: ABNORMAL %
INR BLD: 1.5 (ref 0.9–1.2)
LYMPHOCYTES # BLD: 17 %
MCH RBC QN AUTO: 27.1 PG (ref 26–34)
MCHC RBC AUTO-ENTMCNC: 31.7 G/DL (ref 31–37)
MCV RBC AUTO: 85.5 FL (ref 80–100)
MONOCYTES # BLD: 7 %
PDW BLD-RTO: 15 % (ref 12.1–15.2)
PLATELET # BLD: 343 K/UL (ref 140–450)
PLATELET ESTIMATE: ABNORMAL
PMV BLD AUTO: 7.9 FL (ref 6–12)
POTASSIUM SERPL-SCNC: 3.2 MMOL/L (ref 3.7–5.3)
PROTHROMBIN TIME: 15.5 SEC (ref 9.7–12.2)
RBC # BLD: 3.87 M/UL (ref 4–5.2)
RBC # BLD: ABNORMAL 10*6/UL
SEG NEUTROPHILS: 69 %
SEGMENTED NEUTROPHILS ABSOLUTE COUNT: 4 K/UL (ref 1.8–7.7)
SODIUM BLD-SCNC: 142 MMOL/L (ref 135–144)
TOTAL PROTEIN: 6.4 G/DL (ref 6.4–8.3)
TOTAL PROTEIN: 6.7 G/DL (ref 6.4–8.3)
VANCOMYCIN TROUGH DATE LAST DOSE: NORMAL
VANCOMYCIN TROUGH DOSE AMOUNT: NORMAL
VANCOMYCIN TROUGH TIME LAST DOSE: NORMAL
VANCOMYCIN TROUGH: 15.4 UG/ML (ref 10–20)
WBC # BLD: 5.8 K/UL (ref 3.5–11)
WBC # BLD: ABNORMAL 10*3/UL

## 2017-11-06 PROCEDURE — 85610 PROTHROMBIN TIME: CPT

## 2017-11-06 PROCEDURE — 80076 HEPATIC FUNCTION PANEL: CPT

## 2017-11-06 PROCEDURE — 85025 COMPLETE CBC W/AUTO DIFF WBC: CPT

## 2017-11-06 PROCEDURE — 82565 ASSAY OF CREATININE: CPT

## 2017-11-06 PROCEDURE — 80053 COMPREHEN METABOLIC PANEL: CPT

## 2017-11-06 PROCEDURE — 80202 ASSAY OF VANCOMYCIN: CPT

## 2017-11-06 NOTE — CARE COORDINATION
Ambulatory Care Coordination ED Follow up Call       Reason for ED Visit:  Right hip pain  Care Management Risk Score: CMRS 5  How are you feeling? :     improved  Patient Reports the following:  Pain has decreased in severity with pain medication, encouraged to also try ice packs for 20 minutes at a time             Contact [facility] regarding any worsening symptoms from above. Did you call your PCP prior to going to the ED? No          Post Discharge Status:  What health concerns since you left the Emergency Room?  na    Do you have wounds that you are caring for at home? No    Do you have a follow up appt scheduled?  no - encouraged to call today    Review of Instructions:                                 Do you have any questions regarding your discharge instructions?:  No  Medications:    What questions do you have about your medications? No  Are you taking your medications as directed? If not - why? Yes   Can you afford your medications? yes  ADLS:  Do you need assistance of any kind at home? No   What assistance is needed?  na      FU appts/Provider:    Future Appointments  Date Time Provider Rose Mary Dumont   11/6/2017 3:00 PM [facility]   11/15/2017 10:00 AM Alvin House MD INFT DISEASE [facility]       Health Maintenance Due   Topic Date Due    Flu vaccine (1) 09/01/2017     Patient advised to contact PCP office to have HM items/records faxed to PCP Office directly?   N/A

## 2017-11-09 ENCOUNTER — TELEPHONE (OUTPATIENT)
Dept: PHARMACY | Facility: CLINIC | Age: 77
End: 2017-11-09

## 2017-11-09 NOTE — TELEPHONE ENCOUNTER
CLINICAL PHARMACY: ADHERENCE REVIEW    Identified care gap per [employer]: Rosuvastatin adherence  Per records, appears 30-day supply last filled 9/19/17    Notes: Per Reconcile Medication Dispenses in Care Path:  [facility]:  Rosuvastatin last filled on 10/12/17 for a 90-day supply billed thru patient's [place]      No patient out reach planned at this time.

## 2017-11-10 ENCOUNTER — HOSPITAL ENCOUNTER (OUTPATIENT)
Dept: PHARMACY | Age: 77
Setting detail: THERAPIES SERIES
Discharge: HOME | End: 2017-11-10
Payer: MEDICARE

## 2017-11-10 VITALS
DIASTOLIC BLOOD PRESSURE: 79 MMHG | WEIGHT: 187 LBS | BODY MASS INDEX: 34.2 KG/M2 | HEART RATE: 63 BPM | SYSTOLIC BLOOD PRESSURE: 163 MMHG

## 2017-11-10 DIAGNOSIS — Z79.01 LONG TERM CURRENT USE OF ANTICOAGULANT THERAPY: ICD-10-CM

## 2017-11-10 LAB — INR BLD: 2.3

## 2017-11-10 PROCEDURE — 85610 PROTHROMBIN TIME: CPT

## 2017-11-10 PROCEDURE — 99211 OFF/OP EST MAY X REQ PHY/QHP: CPT

## 2017-11-10 NOTE — PATIENT INSTRUCTIONS
Continue to monitor urine and stool. Continue to monitor for signs of bleeding. Return to clinic in 1 week. Take half tablet (3.75) of warfarin tomorrow and Monday 11/13/17.

## 2017-11-13 ENCOUNTER — TELEPHONE (OUTPATIENT)
Dept: INFECTIOUS DISEASES | Age: 77
End: 2017-11-13

## 2017-11-13 ENCOUNTER — HOSPITAL ENCOUNTER (OUTPATIENT)
Age: 77
Setting detail: SPECIMEN
Discharge: HOME | End: 2017-11-13
Payer: MEDICARE

## 2017-11-13 LAB
ABSOLUTE EOS #: 0.3 K/UL (ref 0–0.4)
ABSOLUTE IMMATURE GRANULOCYTE: ABNORMAL K/UL (ref 0–0.3)
ABSOLUTE LYMPH #: 1.2 K/UL (ref 1–4.8)
ABSOLUTE MONO #: 0.4 K/UL (ref 0.2–0.8)
ALBUMIN SERPL-MCNC: 3.7 G/DL (ref 3.5–5.2)
ALBUMIN/GLOBULIN RATIO: 1.2 (ref 1–2.5)
ALP BLD-CCNC: 84 U/L (ref 35–104)
ALT SERPL-CCNC: 12 U/L (ref 5–33)
ANION GAP SERPL CALCULATED.3IONS-SCNC: 15 MMOL/L (ref 9–17)
AST SERPL-CCNC: 19 U/L
BASOPHILS # BLD: 0 %
BASOPHILS ABSOLUTE: 0 K/UL (ref 0–0.2)
BILIRUB SERPL-MCNC: 0.23 MG/DL (ref 0.3–1.2)
BILIRUBIN DIRECT: <0.08 MG/DL
BUN BLDV-MCNC: 6 MG/DL (ref 8–23)
BUN/CREAT BLD: 8 (ref 9–20)
CALCIUM SERPL-MCNC: 9.4 MG/DL (ref 8.6–10.4)
CHLORIDE BLD-SCNC: 100 MMOL/L (ref 98–107)
CO2: 23 MMOL/L (ref 20–31)
CREAT SERPL-MCNC: 0.79 MG/DL (ref 0.5–0.9)
CREAT SERPL-MCNC: 0.79 MG/DL (ref 0.5–0.9)
DIFFERENTIAL TYPE: ABNORMAL
EOSINOPHILS RELATIVE PERCENT: 6 %
GFR AFRICAN AMERICAN: >60 ML/MIN
GFR AFRICAN AMERICAN: >60 ML/MIN
GFR NON-AFRICAN AMERICAN: >60 ML/MIN
GFR NON-AFRICAN AMERICAN: >60 ML/MIN
GFR SERPL CREATININE-BSD FRML MDRD: ABNORMAL ML/MIN/{1.73_M2}
GFR SERPL CREATININE-BSD FRML MDRD: ABNORMAL ML/MIN/{1.73_M2}
GFR SERPL CREATININE-BSD FRML MDRD: NORMAL ML/MIN/{1.73_M2}
GFR SERPL CREATININE-BSD FRML MDRD: NORMAL ML/MIN/{1.73_M2}
GLUCOSE BLD-MCNC: 124 MG/DL (ref 70–99)
HCT VFR BLD CALC: 33.3 % (ref 36–46)
HEMOGLOBIN: 11 G/DL (ref 12–16)
IMMATURE GRANULOCYTES: ABNORMAL %
INR BLD: 4.6 (ref 0.9–1.2)
LYMPHOCYTES # BLD: 19 %
MCH RBC QN AUTO: 27.6 PG (ref 26–34)
MCHC RBC AUTO-ENTMCNC: 33 G/DL (ref 31–37)
MCV RBC AUTO: 83.6 FL (ref 80–100)
MONOCYTES # BLD: 6 %
PDW BLD-RTO: 14.5 % (ref 12.1–15.2)
PLATELET # BLD: 302 K/UL (ref 140–450)
PLATELET ESTIMATE: ABNORMAL
PMV BLD AUTO: 8.7 FL (ref 6–12)
POTASSIUM SERPL-SCNC: 3.2 MMOL/L (ref 3.7–5.3)
PROTHROMBIN TIME: 52.6 SEC (ref 9.7–12.2)
RBC # BLD: 3.99 M/UL (ref 4–5.2)
RBC # BLD: ABNORMAL 10*6/UL
SEG NEUTROPHILS: 69 %
SEGMENTED NEUTROPHILS ABSOLUTE COUNT: 4.4 K/UL (ref 1.8–7.7)
SODIUM BLD-SCNC: 138 MMOL/L (ref 135–144)
TOTAL PROTEIN: 6.9 G/DL (ref 6.4–8.3)
VANCOMYCIN TROUGH DATE LAST DOSE: ABNORMAL
VANCOMYCIN TROUGH DOSE AMOUNT: ABNORMAL
VANCOMYCIN TROUGH TIME LAST DOSE: ABNORMAL
VANCOMYCIN TROUGH: 21.5 UG/ML (ref 10–20)
WBC # BLD: 6.3 K/UL (ref 3.5–11)
WBC # BLD: ABNORMAL 10*3/UL

## 2017-11-13 PROCEDURE — 80053 COMPREHEN METABOLIC PANEL: CPT

## 2017-11-13 PROCEDURE — 80202 ASSAY OF VANCOMYCIN: CPT

## 2017-11-13 PROCEDURE — 82565 ASSAY OF CREATININE: CPT

## 2017-11-13 PROCEDURE — 85025 COMPLETE CBC W/AUTO DIFF WBC: CPT

## 2017-11-13 PROCEDURE — 85610 PROTHROMBIN TIME: CPT

## 2017-11-13 PROCEDURE — 82248 BILIRUBIN DIRECT: CPT

## 2017-11-13 NOTE — TELEPHONE ENCOUNTER
Dr. Gabby Nation called for orders on patient. Her Vanco level was high so he wanted me to call patient and get home care information to decrease to 1.5gm Q 24 hours from 1.75gm. Q24 hours. I spoke to patient and she said she had her dose today. I called and spoke to Dr. Gabby Nation and he said hold dose for tomorrow. 11-14-17 and draw a Vanco Level that is to be called into him ASAP then he will send an order to [facility]  (PH# [phone]) Nurse Omer Saldana. To lower vanco dose. Omer Saldana took a verbal from me today on orders to hold dose and mona Vanco level.

## 2017-11-14 ENCOUNTER — HOSPITAL ENCOUNTER (OUTPATIENT)
Age: 77
Setting detail: SPECIMEN
Discharge: HOME | End: 2017-11-14
Payer: MEDICARE

## 2017-11-14 ENCOUNTER — TELEPHONE (OUTPATIENT)
Dept: PHARMACY | Age: 77
End: 2017-11-14

## 2017-11-14 ENCOUNTER — TELEPHONE (OUTPATIENT)
Dept: PULMONOLOGY | Age: 77
End: 2017-11-14

## 2017-11-14 LAB
VANCOMYCIN TROUGH DATE LAST DOSE: ABNORMAL
VANCOMYCIN TROUGH DOSE AMOUNT: ABNORMAL
VANCOMYCIN TROUGH TIME LAST DOSE: ABNORMAL
VANCOMYCIN TROUGH: 21.4 UG/ML (ref 10–20)

## 2017-11-14 PROCEDURE — 80202 ASSAY OF VANCOMYCIN: CPT

## 2017-11-15 ENCOUNTER — HOSPITAL ENCOUNTER (OUTPATIENT)
Age: 77
Discharge: HOME | End: 2017-11-15
Payer: MEDICARE

## 2017-11-15 ENCOUNTER — OFFICE VISIT (OUTPATIENT)
Dept: INFECTIOUS DISEASES | Age: 77
End: 2017-11-15
Payer: MEDICARE

## 2017-11-15 ENCOUNTER — TELEPHONE (OUTPATIENT)
Dept: INFECTIOUS DISEASES | Age: 77
End: 2017-11-15

## 2017-11-15 VITALS
DIASTOLIC BLOOD PRESSURE: 90 MMHG | HEART RATE: 110 BPM | HEIGHT: 64 IN | TEMPERATURE: 97.2 F | WEIGHT: 180 LBS | BODY MASS INDEX: 30.73 KG/M2 | SYSTOLIC BLOOD PRESSURE: 175 MMHG

## 2017-11-15 DIAGNOSIS — G06.2 EPIDURAL ABSCESS: ICD-10-CM

## 2017-11-15 DIAGNOSIS — G06.2 EPIDURAL ABSCESS: Primary | ICD-10-CM

## 2017-11-15 LAB
CREAT SERPL-MCNC: 0.87 MG/DL (ref 0.5–0.9)
GFR AFRICAN AMERICAN: >60 ML/MIN
GFR NON-AFRICAN AMERICAN: >60 ML/MIN
GFR SERPL CREATININE-BSD FRML MDRD: NORMAL ML/MIN/{1.73_M2}
GFR SERPL CREATININE-BSD FRML MDRD: NORMAL ML/MIN/{1.73_M2}
VANCOMYCIN RANDOM DATE LAST DOSE: NORMAL
VANCOMYCIN RANDOM DOSE AMOUNT: NORMAL
VANCOMYCIN RANDOM TIME LAST DOSE: NORMAL
VANCOMYCIN RANDOM: 11.1 UG/ML

## 2017-11-15 PROCEDURE — 82565 ASSAY OF CREATININE: CPT

## 2017-11-15 PROCEDURE — 99213 OFFICE O/P EST LOW 20 MIN: CPT | Performed by: INTERNAL MEDICINE

## 2017-11-15 PROCEDURE — 80202 ASSAY OF VANCOMYCIN: CPT

## 2017-11-16 ENCOUNTER — TELEPHONE (OUTPATIENT)
Dept: PULMONOLOGY | Age: 77
End: 2017-11-16

## 2017-11-16 NOTE — PROGRESS NOTES
[facility]   Follow-up NOTE           Visit date: 11/15/2017      Reason for visit /chief complaints   sepsis    Assessment:     1. Epidural abscess    2 MRSA sepsis   3 GERD          Plan:   vanco on hold b/c of high level  Check vanco level and creatinine   Home care to call me tomorrow with results so we can decide on further Rx   Check MRI lumber for f/u     HPI:  Pt is 68 y f came in for f/u after d/gary from [facility], she was seen by me at [facility] and was diagnosed with MRSA sepsis and epidural abscess s/p I and d and was d/gary on vanco     She denies any fever, no cough or sob  No vomit or diarhea    Still having some back pain         Past Medical History:   Diagnosis Date    Adenomatous colon polyp 7/2015    tubular adenoma    Anxiety     Depression     Diverticulitis 2009    [facility] Le[facility] [address] [address]     GERD (gastroesophageal reflux disease)     History of blood transfusion     no reaction    History of DVT (deep vein thrombosis)     DVT right leg    Hyperlipidemia     Impaired fasting glucose     Kidney stones     MRSA bacteremia 09/23/2017     Past Surgical History:   Procedure Laterality Date    COLONOSCOPY  2011    Dr. Severa Sayre - no polyps, pan-diverticulosis    COLONOSCOPY  7/23/2015    -polyp (tubular adenoma),diverticulosis,hemorrhoids    CYST REMOVAL      ovary    EYE SURGERY Bilateral     cataracts    JOINT REPLACEMENT      TONSILLECTOMY      TUBAL LIGATION      laporscopic    VENTRAL HERNIA REPAIR  04/22/19    Dr. Shalini Carlin - robot assisted laproscopic ventral hernia repair with mesh     Social History     Social History    Marital status:       Spouse name: N/A    Number of children: N/A    Years of education: N/A     Social History Main Topics    Smoking status: Former Smoker    Smokeless tobacco: Never Used    Alcohol use No    Drug use: No    Sexual activity: Not Asked     Other Topics Concern    None     Social History Narrative    None     Family History   Problem Relation Age of Onset    Cancer Father        Current med     Current Outpatient Prescriptions:     vancomycin (VANCOCIN) 1000 MG injection, Infuse 1,750 mg intravenously daily, Disp: , Rfl:     docusate sodium (COLACE) 100 MG capsule, Take 100 mg by mouth daily, Disp: , Rfl:     Coenzyme Q10 (COQ-10) 100 MG CAPS, Take 100 mg by mouth daily, Disp: , Rfl:     rosuvastatin (CRESTOR) 5 MG tablet, Take 1 tablet by mouth nightly, Disp: 30 tablet, Rfl: 6    ezetimibe (ZETIA) 10 MG tablet, Take 1 tablet by mouth daily, Disp: 30 tablet, Rfl: 6    ALPRAZolam (XANAX) 0.25 MG tablet, Take 1 tablet by mouth daily as needed for Anxiety, Disp: 30 tablet, Rfl: 5    sertraline (ZOLOFT) 100 MG tablet, Take 1 tablet by mouth daily (take along with 50 mg tablet for total daily dose of 150 mg daily) (Patient taking differently: Take 100 mg by mouth daily ), Disp: 90 tablet, Rfl: 3    traMADol (ULTRAM) 50 MG tablet, Take 1 tablet by mouth 3 times daily as needed for Pain, Disp: 90 tablet, Rfl: 5    warfarin (COUMADIN) 7.5 MG tablet, Take 1 tablet by mouth daily, Disp: 30 tablet, Rfl: 6    acetaminophen (TYLENOL) 500 MG tablet, Take 500 mg by mouth every 4 hours as needed , Disp: , Rfl:      Review of Systems:  CONSTITUTIONAL:  negative  EYES:  negative  HEENT:  negative  RESPIRATORY:  negative  CARDIOVASCULAR:  negative  GASTROINTESTINAL:  negative  GENITOURINARY:  negative  INTEGUMENT/BREAST:  negative  HEMATOLOGIC/LYMPHATIC:  negative  ALLERGIC/IMMUNOLOGIC:  negative  ENDOCRINE:  negative  MUSCULOSKELETAL:  Back pain   NEUROLOGICAL:  negative  BEHAVIOR/PSYCH:  negative    BP (!) 175/90   Pulse 110   Temp 97.2 °F (36.2 °C)   Ht 5' 4\" (1.626 m)   Wt 180 lb (81.6 kg)   BMI 30.90 kg/m²       EXAM:  CONSTITUTIONAL:  awake, alert, cooperative, no apparent distress,  EYES: conjunctiva normal  ENT:  Normocephalic, without obvious abnormality, atraumatic,  LUNGS:  No increased work of breathing, good air exchange, clear to auscultation bilaterally, no crackles or wheezing  CARDIOVASCULAR:  regular rate and rhythm, normal S1 and S2,  no murmur noted  ABDOMEN:   normal bowel sounds, soft, non-distended, non-tender, no masses palpated, no hepatosplenomegally,   MUSCULOSKELETAL:  There is no redness, warmth, or swelling of the joints.     NEUROLOGIC:  Awake, alert, oriented to name, place and time  SKIN:  No rash    Back surgical site healing, no drainage      Data Review:    CBC with Diff  Results in Past 30 Days  Result Component Current Result Ref Range Previous Result Ref Range   Absolute Eos # 0.30 (11/13/2017) 0.0 - 0.4 k/uL 0.40 (11/6/2017) 0.0 - 0.4 k/uL   Absolute Lymph # 1.20 (11/13/2017) 1.0 - 4.8 k/uL 1.00 (11/6/2017) 1.0 - 4.8 k/uL   Absolute Mono # 0.40 (11/13/2017) 0.2 - 0.8 k/uL 0.40 (11/6/2017) 0.0 - 1.0 k/uL   Basophils # 0.00 (11/13/2017) 0.0 - 0.2 k/uL 0.00 (11/6/2017) 0.0 - 0.2 k/uL   Differential Type NOT REPORTED (11/13/2017)  NOT REPORTED (11/6/2017)    Hematocrit 33.3 (L) (11/13/2017) 36 - 46 % 33.1 (L) (11/6/2017) 36 - 46 %   Hemoglobin 11.0 (L) (11/13/2017) 12.0 - 16.0 g/dL 10.5 (L) (11/6/2017) 12.0 - 16.0 g/dL   Lymphocytes 19 (11/13/2017) % 17 (11/6/2017) %   Platelets 245 (38/65/3122) 140 - 450 k/uL 343 (11/6/2017) 140 - 450 k/uL   WBC 6.3 (11/13/2017) 3.5 - 11.0 k/uL 5.8 (11/6/2017) 3.5 - 11.0 k/uL       BMP  Results in Past 30 Days  Result Component Current Result Ref Range Previous Result Ref Range   BUN 6 (L) (11/13/2017) 8 - 23 mg/dL 9 (11/6/2017) 8 - 23 mg/dL   Calcium 9.4 (11/13/2017) 8.6 - 10.4 mg/dL 9.0 (11/6/2017) 8.6 - 10.4 mg/dL   Chloride 100 (11/13/2017) 98 - 107 mmol/L 104 (11/6/2017) 98 - 107 mmol/L   CO2 23 (11/13/2017) 20 - 31 mmol/L 23 (11/6/2017) 20 - 31 mmol/L   CREATININE 0.87 (11/15/2017) 0.50 - 0.90 mg/dL 0.79 (11/13/2017) 0.50 - 0.90 mg/dL      0.79 (11/13/2017) 0.50 - 0.90 mg/dL   Glucose 124 (H) (11/13/2017) 70 - 99 mg/dL 109 (H) (11/6/2017) 70 - 99 mg/dL   Potassium 3.2 (L) (11/13/2017) 3.7 - 5.3 mmol/L 3.2 (L) (11/6/2017) 3.7 - 5.3 mmol/L   Sodium 138 (11/13/2017) 135 - 144 mmol/L 142 (11/6/2017) 135 - 144 mmol/L       LFT  Results in Past 30 Days  Result Component Current Result Ref Range Previous Result Ref Range   Alb 3.7 (11/13/2017) 3.5 - 5.2 g/dL 3.6 (11/6/2017) 3.5 - 5.2 g/dL      3.6 (11/6/2017) 3.5 - 5.2 g/dL   Alkaline Phosphatase 84 (11/13/2017) 35 - 104 U/L 77 (11/6/2017) 35 - 104 U/L      77 (11/6/2017) 35 - 104 U/L   ALT 12 (11/13/2017) 5 - 33 U/L 10 (11/6/2017) 5 - 33 U/L      9 (11/6/2017) 5 - 33 U/L   AST 19 (11/13/2017) <32 U/L 14 (11/6/2017) <32 U/L      14 (11/6/2017) <32 U/L   Bilirubin, Direct <0.08 (11/13/2017) <0.31 mg/dL <0.08 (11/6/2017) <0.31 mg/dL   Total Bilirubin 0.23 (L) (11/13/2017) 0.3 - 1.2 mg/dL 0.20 (L) (11/6/2017) 0.3 - 1.2 mg/dL      0.22 (L) (11/6/2017) 0.3 - 1.2 mg/dL   Total Protein 6.9 (11/13/2017) 6.4 - 8.3 g/dL 6.4 (11/6/2017) 6.4 - 8.3 g/dL      6.7 (11/6/2017) 6.4 - 8.3 g/dL        MICRO:      IMAGING:          Arabella Paniagua MD  11/15/2017  9:48 PM

## 2017-11-16 NOTE — TELEPHONE ENCOUNTER
When is her MRI    change vanco dose to 1 gm iv q24hr, check vanco level and creatinine on sat and call me with results

## 2017-11-17 ENCOUNTER — HOSPITAL ENCOUNTER (OUTPATIENT)
Dept: PHARMACY | Age: 77
Setting detail: THERAPIES SERIES
Discharge: HOME | End: 2017-11-17
Payer: MEDICARE

## 2017-11-17 VITALS
WEIGHT: 182 LBS | SYSTOLIC BLOOD PRESSURE: 131 MMHG | HEART RATE: 113 BPM | BODY MASS INDEX: 31.24 KG/M2 | DIASTOLIC BLOOD PRESSURE: 67 MMHG

## 2017-11-17 DIAGNOSIS — Z79.01 LONG TERM CURRENT USE OF ANTICOAGULANT THERAPY: ICD-10-CM

## 2017-11-17 LAB — INR BLD: 1.6

## 2017-11-17 PROCEDURE — 85610 PROTHROMBIN TIME: CPT

## 2017-11-17 PROCEDURE — 99211 OFF/OP EST MAY X REQ PHY/QHP: CPT

## 2017-11-17 NOTE — PROGRESS NOTES
Patient states no visible blood in urine and no black tarry stool. No change in other medications. Will return to clinic in 1 week. Patient will take warfarin half tablet (3.75 mg) on Sun (11/19) and Wed (11/22) as directed on calendar. Patient has decreased Vancomycin dose to 1000 G IV daily.

## 2017-11-20 ENCOUNTER — HOSPITAL ENCOUNTER (OUTPATIENT)
Age: 77
Setting detail: SPECIMEN
Discharge: HOME | End: 2017-11-20
Payer: MEDICARE

## 2017-11-20 LAB
ABSOLUTE EOS #: 0.2 K/UL (ref 0–0.4)
ABSOLUTE IMMATURE GRANULOCYTE: ABNORMAL K/UL (ref 0–0.3)
ABSOLUTE LYMPH #: 1.2 K/UL (ref 1–4.8)
ABSOLUTE MONO #: 0.3 K/UL (ref 0–1)
ALBUMIN SERPL-MCNC: 4 G/DL (ref 3.5–5.2)
ALBUMIN/GLOBULIN RATIO: 1.3 (ref 1–2.5)
ALP BLD-CCNC: 80 U/L (ref 35–104)
ALT SERPL-CCNC: 9 U/L (ref 5–33)
AST SERPL-CCNC: 13 U/L
BASOPHILS # BLD: 1 %
BASOPHILS ABSOLUTE: 0.1 K/UL (ref 0–0.2)
BILIRUB SERPL-MCNC: 0.25 MG/DL (ref 0.3–1.2)
BILIRUBIN DIRECT: <0.08 MG/DL
BILIRUBIN, INDIRECT: ABNORMAL MG/DL (ref 0–1)
CREAT SERPL-MCNC: 0.79 MG/DL (ref 0.5–0.9)
DIFFERENTIAL TYPE: ABNORMAL
EOSINOPHILS RELATIVE PERCENT: 4 %
GFR AFRICAN AMERICAN: >60 ML/MIN
GFR NON-AFRICAN AMERICAN: >60 ML/MIN
GFR SERPL CREATININE-BSD FRML MDRD: NORMAL ML/MIN/{1.73_M2}
GFR SERPL CREATININE-BSD FRML MDRD: NORMAL ML/MIN/{1.73_M2}
GLOBULIN: ABNORMAL G/DL (ref 1.5–3.8)
HCT VFR BLD CALC: 34.4 % (ref 36–46)
HEMOGLOBIN: 11.2 G/DL (ref 12–16)
IMMATURE GRANULOCYTES: ABNORMAL %
LYMPHOCYTES # BLD: 19 %
MCH RBC QN AUTO: 27.1 PG (ref 26–34)
MCHC RBC AUTO-ENTMCNC: 32.4 G/DL (ref 31–37)
MCV RBC AUTO: 83.7 FL (ref 80–100)
MONOCYTES # BLD: 5 %
PDW BLD-RTO: 15.8 % (ref 12.1–15.2)
PLATELET # BLD: 356 K/UL (ref 140–450)
PLATELET ESTIMATE: ABNORMAL
PMV BLD AUTO: 8.4 FL (ref 6–12)
RBC # BLD: 4.11 M/UL (ref 4–5.2)
RBC # BLD: ABNORMAL 10*6/UL
SEG NEUTROPHILS: 71 %
SEGMENTED NEUTROPHILS ABSOLUTE COUNT: 4.5 K/UL (ref 1.8–7.7)
TOTAL PROTEIN: 7 G/DL (ref 6.4–8.3)
VANCOMYCIN TROUGH DATE LAST DOSE: NORMAL
VANCOMYCIN TROUGH DOSE AMOUNT: NORMAL
VANCOMYCIN TROUGH TIME LAST DOSE: NORMAL
VANCOMYCIN TROUGH: 12.1 UG/ML (ref 10–20)
WBC # BLD: 6.3 K/UL (ref 3.5–11)
WBC # BLD: ABNORMAL 10*3/UL

## 2017-11-20 PROCEDURE — 80076 HEPATIC FUNCTION PANEL: CPT

## 2017-11-20 PROCEDURE — 80202 ASSAY OF VANCOMYCIN: CPT

## 2017-11-20 PROCEDURE — 85025 COMPLETE CBC W/AUTO DIFF WBC: CPT

## 2017-11-20 PROCEDURE — 82565 ASSAY OF CREATININE: CPT

## 2017-11-22 ENCOUNTER — HOSPITAL ENCOUNTER (OUTPATIENT)
Age: 77
Setting detail: SPECIMEN
Discharge: HOME | End: 2017-11-22
Payer: MEDICARE

## 2017-11-22 LAB
CREAT SERPL-MCNC: 0.81 MG/DL (ref 0.5–0.9)
GFR AFRICAN AMERICAN: >60 ML/MIN
GFR NON-AFRICAN AMERICAN: >60 ML/MIN
GFR SERPL CREATININE-BSD FRML MDRD: NORMAL ML/MIN/{1.73_M2}
GFR SERPL CREATININE-BSD FRML MDRD: NORMAL ML/MIN/{1.73_M2}
VANCOMYCIN TROUGH DATE LAST DOSE: NORMAL
VANCOMYCIN TROUGH DOSE AMOUNT: NORMAL
VANCOMYCIN TROUGH TIME LAST DOSE: NORMAL
VANCOMYCIN TROUGH: 11 UG/ML (ref 10–20)

## 2017-11-22 PROCEDURE — 82565 ASSAY OF CREATININE: CPT

## 2017-11-22 PROCEDURE — 80202 ASSAY OF VANCOMYCIN: CPT

## 2017-11-24 ENCOUNTER — HOSPITAL ENCOUNTER (OUTPATIENT)
Dept: PHARMACY | Age: 77
Setting detail: THERAPIES SERIES
Discharge: HOME | End: 2017-11-24
Payer: MEDICARE

## 2017-11-24 ENCOUNTER — HOSPITAL ENCOUNTER (OUTPATIENT)
Dept: MRI IMAGING | Age: 77
Discharge: HOME | End: 2017-11-24
Payer: MEDICARE

## 2017-11-24 ENCOUNTER — HOSPITAL ENCOUNTER (OUTPATIENT)
Age: 77
Discharge: HOME | End: 2017-11-24
Payer: MEDICARE

## 2017-11-24 VITALS — BODY MASS INDEX: 30.93 KG/M2 | WEIGHT: 180.2 LBS

## 2017-11-24 DIAGNOSIS — Z79.01 LONG TERM CURRENT USE OF ANTICOAGULANT THERAPY: ICD-10-CM

## 2017-11-24 DIAGNOSIS — G06.2 EPIDURAL ABSCESS: ICD-10-CM

## 2017-11-24 LAB
CREAT SERPL-MCNC: 0.85 MG/DL (ref 0.5–0.9)
GFR AFRICAN AMERICAN: >60 ML/MIN
GFR NON-AFRICAN AMERICAN: >60 ML/MIN
GFR SERPL CREATININE-BSD FRML MDRD: NORMAL ML/MIN/{1.73_M2}
GFR SERPL CREATININE-BSD FRML MDRD: NORMAL ML/MIN/{1.73_M2}
INR BLD: 2.6
VANCOMYCIN TROUGH DATE LAST DOSE: NORMAL
VANCOMYCIN TROUGH DOSE AMOUNT: NORMAL
VANCOMYCIN TROUGH TIME LAST DOSE: NORMAL
VANCOMYCIN TROUGH: 11.5 UG/ML (ref 10–20)

## 2017-11-24 PROCEDURE — 85610 PROTHROMBIN TIME: CPT | Performed by: FAMILY MEDICINE

## 2017-11-24 PROCEDURE — 72158 MRI LUMBAR SPINE W/O & W/DYE: CPT

## 2017-11-24 PROCEDURE — 99211 OFF/OP EST MAY X REQ PHY/QHP: CPT | Performed by: FAMILY MEDICINE

## 2017-11-24 PROCEDURE — 80202 ASSAY OF VANCOMYCIN: CPT

## 2017-11-24 PROCEDURE — 82565 ASSAY OF CREATININE: CPT

## 2017-11-24 PROCEDURE — 36415 COLL VENOUS BLD VENIPUNCTURE: CPT

## 2017-11-24 PROCEDURE — 6360000004 HC RX CONTRAST MEDICATION: Performed by: INTERNAL MEDICINE

## 2017-11-24 PROCEDURE — A9579 GAD-BASE MR CONTRAST NOS,1ML: HCPCS | Performed by: INTERNAL MEDICINE

## 2017-11-24 RX ADMIN — GADOTERIDOL 17 ML: 279.3 INJECTION, SOLUTION INTRAVENOUS at 11:10

## 2017-11-24 NOTE — PROGRESS NOTES
Fingerstick INR drawn per clinic protocol. Patient states no visible blood in urine and no black tarry stool. Denies any missed doses of warfarin. No change in other maintenance medications or in diet. Will continue current warfarin regimen and recheck INR in 10 day(s). Marcus Bravo continues Vancomycin 1gram IV daily at this time but is scheduled for MRI following this appointment. She has a follow up appointment with Dr Eber Donovan on Wednesday, 11/29. She believes she will receive a call from Dr Walt Portillo office later today about continued IV therapy. Patient will contact our clinic with update. Patient will take 3.75mg warfarin today, Tuesday 11/28 and Friday 12/1 unless she contacts our clinic with changes. Patient states understanding. Patient is converting to her cane as of today. She is somewhat unstable and I have walked her to the outpat lab and xray department. She declines BP check as it is checked daily by home health nurse and declines using a wheelchair when she became tired walking. Patient did hold onto cane as well as my arm to complete walk.

## 2017-11-27 ENCOUNTER — TELEPHONE (OUTPATIENT)
Dept: INFECTIOUS DISEASES | Age: 77
End: 2017-11-27

## 2017-11-27 ENCOUNTER — HOSPITAL ENCOUNTER (OUTPATIENT)
Age: 77
Setting detail: SPECIMEN
Discharge: HOME | End: 2017-11-27
Payer: MEDICARE

## 2017-11-27 LAB
ABSOLUTE EOS #: 0.3 K/UL (ref 0–0.4)
ABSOLUTE IMMATURE GRANULOCYTE: ABNORMAL K/UL (ref 0–0.3)
ABSOLUTE LYMPH #: 1.2 K/UL (ref 1–4.8)
ABSOLUTE MONO #: 0.3 K/UL (ref 0–1)
ALBUMIN SERPL-MCNC: 3.7 G/DL (ref 3.5–5.2)
ALBUMIN/GLOBULIN RATIO: 1.1 (ref 1–2.5)
ALP BLD-CCNC: 81 U/L (ref 35–104)
ALT SERPL-CCNC: 9 U/L (ref 5–33)
AST SERPL-CCNC: 15 U/L
BASOPHILS # BLD: 1 % (ref 0–2)
BASOPHILS ABSOLUTE: 0.1 K/UL (ref 0–0.2)
BILIRUB SERPL-MCNC: 0.29 MG/DL (ref 0.3–1.2)
BILIRUBIN DIRECT: <0.08 MG/DL
BILIRUBIN, INDIRECT: ABNORMAL MG/DL (ref 0–1)
CREAT SERPL-MCNC: 0.76 MG/DL (ref 0.5–0.9)
DIFFERENTIAL TYPE: ABNORMAL
EOSINOPHILS RELATIVE PERCENT: 5 % (ref 0–8)
GFR AFRICAN AMERICAN: >60 ML/MIN
GFR NON-AFRICAN AMERICAN: >60 ML/MIN
GFR SERPL CREATININE-BSD FRML MDRD: NORMAL ML/MIN/{1.73_M2}
GFR SERPL CREATININE-BSD FRML MDRD: NORMAL ML/MIN/{1.73_M2}
GLOBULIN: ABNORMAL G/DL (ref 1.5–3.8)
HCT VFR BLD CALC: 35.6 % (ref 36–46)
HEMOGLOBIN: 11.4 G/DL (ref 12–16)
IMMATURE GRANULOCYTES: ABNORMAL %
LYMPHOCYTES # BLD: 18 % (ref 24–44)
MCH RBC QN AUTO: 26.9 PG (ref 26–34)
MCHC RBC AUTO-ENTMCNC: 31.9 G/DL (ref 31–37)
MCV RBC AUTO: 84.3 FL (ref 80–100)
MONOCYTES # BLD: 5 % (ref 0–12)
PDW BLD-RTO: 16.5 % (ref 12.1–15.2)
PLATELET # BLD: 301 K/UL (ref 140–450)
PLATELET ESTIMATE: ABNORMAL
PMV BLD AUTO: 9 FL (ref 6–12)
RBC # BLD: 4.22 M/UL (ref 4–5.2)
RBC # BLD: ABNORMAL 10*6/UL
SEG NEUTROPHILS: 71 % (ref 36–66)
SEGMENTED NEUTROPHILS ABSOLUTE COUNT: 4.5 K/UL (ref 1.8–7.7)
TOTAL PROTEIN: 7 G/DL (ref 6.4–8.3)
VANCOMYCIN TROUGH DATE LAST DOSE: NORMAL
VANCOMYCIN TROUGH DOSE AMOUNT: NORMAL
VANCOMYCIN TROUGH TIME LAST DOSE: NORMAL
VANCOMYCIN TROUGH: 12.1 UG/ML (ref 10–20)
WBC # BLD: 6.4 K/UL (ref 3.5–11)
WBC # BLD: ABNORMAL 10*3/UL

## 2017-11-27 PROCEDURE — 80202 ASSAY OF VANCOMYCIN: CPT

## 2017-11-27 PROCEDURE — 85025 COMPLETE CBC W/AUTO DIFF WBC: CPT

## 2017-11-27 PROCEDURE — 80076 HEPATIC FUNCTION PANEL: CPT

## 2017-11-27 PROCEDURE — 82565 ASSAY OF CREATININE: CPT

## 2017-11-29 ENCOUNTER — OFFICE VISIT (OUTPATIENT)
Dept: INFECTIOUS DISEASES | Age: 77
End: 2017-11-29
Payer: MEDICARE

## 2017-11-29 ENCOUNTER — TELEPHONE (OUTPATIENT)
Dept: INFECTIOUS DISEASES | Age: 77
End: 2017-11-29

## 2017-11-29 VITALS
HEART RATE: 58 BPM | DIASTOLIC BLOOD PRESSURE: 81 MMHG | WEIGHT: 181 LBS | HEIGHT: 64 IN | BODY MASS INDEX: 30.9 KG/M2 | SYSTOLIC BLOOD PRESSURE: 176 MMHG

## 2017-11-29 DIAGNOSIS — G06.2 EPIDURAL ABSCESS: ICD-10-CM

## 2017-11-29 DIAGNOSIS — M46.26 OSTEOMYELITIS OF LUMBAR SPINE (HCC): ICD-10-CM

## 2017-11-29 PROCEDURE — 99213 OFFICE O/P EST LOW 20 MIN: CPT | Performed by: INTERNAL MEDICINE

## 2017-11-29 NOTE — TELEPHONE ENCOUNTER
Dr Quick Fairly, you wanted patient to have WBC, platelets, creatinine, LFT's and Vanco level drawn every four days while she is on antibiotics. Gayatri Flores from home care called and said they cassandra patient's labs on 11/27/17. She is wondering if it would be ok to draw the labs on 12/1, 12/5, 12/8, and 12/11 due to the weekends. Please advise.

## 2017-11-30 NOTE — PROGRESS NOTES
[facility]    Follow-up NOTE           Visit date: 11/29/2017      Reason for visit /chief complaints   spidural abscess    Assessment:     1. Osteomyelitis of lumbar spine ([facility] [address]     2. Epidural abscess     3 MRSA sepsis         Plan:     Continue vanco until 12/11  Refer to neurogeon to evaluate regarding any repeat surgical intervention base on new MRI   Wbc, platelets, creatinine, lfts and vanco level every 4 days  ID Clinic in 2wk    HPI:    Pt is 68 y f came in for f/u   I am following her for epidural abscess sec to MRSA s/p I and d at [facility], on vanco    I order repeat MRI for f/u on 11/24 showed discitis/osteo L3-4 and fluid collection    I referred her back to neurogeon ,she has appointment coming up    I extend vanco until 12/11    het back pain is much better  No fever or chills  No cough or sob  No vomit or diarrhea         Past Medical History:   Diagnosis Date    Adenomatous colon polyp 7/2015    tubular adenoma    Anxiety     Depression     Diverticulitis 2009    Epidural abscess     Factor V Leiden ([facility] [address]     GERD (gastroesophageal reflux disease)     History of blood transfusion     no reaction    History of DVT (deep vein thrombosis)     DVT right leg    Hyperlipidemia     Impaired fasting glucose     Kidney stones     MRSA bacteremia 09/23/2017     Past Surgical History:   Procedure Laterality Date    COLONOSCOPY  2011    Dr. Enzo Charlton - no polyps, pan-diverticulosis    COLONOSCOPY  7/23/2015    -polyp (tubular adenoma),diverticulosis,hemorrhoids    CYST REMOVAL      ovary    EYE SURGERY Bilateral     cataracts    JOINT REPLACEMENT      TONSILLECTOMY      TUBAL LIGATION      laporscopic    VENTRAL HERNIA REPAIR  04/22/19    Dr. Crooks [place] - robot assisted laproscopic ventral hernia repair with mesh     Social History     Social History    Marital status:       Spouse name: N/A    Number of children: N/A    Years of education: N/A     Social History Main Topics    Smoking status: Former Smoker    Smokeless tobacco: Never Used    Alcohol use No    Drug use: No    Sexual activity: Not Asked     Other Topics Concern    None     Social History Narrative    None     Family History   Problem Relation Age of Onset    Cancer Father        Current med     Current Outpatient Prescriptions:     vancomycin (VANCOCIN) 1000 MG injection, Infuse 1,000 mg intravenously daily , Disp: , Rfl:     docusate sodium (COLACE) 100 MG capsule, Take 100 mg by mouth daily as needed , Disp: , Rfl:     Coenzyme Q10 (COQ-10) 100 MG CAPS, Take 100 mg by mouth daily, Disp: , Rfl:     rosuvastatin (CRESTOR) 5 MG tablet, Take 1 tablet by mouth nightly, Disp: 30 tablet, Rfl: 6    ezetimibe (ZETIA) 10 MG tablet, Take 1 tablet by mouth daily, Disp: 30 tablet, Rfl: 6    sertraline (ZOLOFT) 100 MG tablet, Take 1 tablet by mouth daily (take along with 50 mg tablet for total daily dose of 150 mg daily) (Patient taking differently: Take 100 mg by mouth daily ), Disp: 90 tablet, Rfl: 3    warfarin (COUMADIN) 7.5 MG tablet, Take 1 tablet by mouth daily, Disp: 30 tablet, Rfl: 6    acetaminophen (TYLENOL) 500 MG tablet, Take 500 mg by mouth every 4 hours as needed , Disp: , Rfl:      Review of Systems:  CONSTITUTIONAL:  negative  EYES:  negative  HEENT:  negative  RESPIRATORY:  negative  CARDIOVASCULAR:  negative  GASTROINTESTINAL:  negative  GENITOURINARY:  negative  INTEGUMENT/BREAST:  negative  HEMATOLOGIC/LYMPHATIC:  negative  ALLERGIC/IMMUNOLOGIC:  negative  ENDOCRINE:  negative  MUSCULOSKELETAL: mild back pain   NEUROLOGICAL:  negative  BEHAVIOR/PSYCH:  negative    BP (!) 176/81 (Site: Left Arm, Position: Sitting, Cuff Size: Medium Adult)   Pulse 58   Ht 5' 4\" (1.626 m)   Wt 181 lb (82.1 kg)   BMI 31.07 kg/m²       EXAM:  CONSTITUTIONAL:  awake, alert, cooperative, no apparent distress,  EYES: conjunctiva normal  ENT:  Normocephalic, without obvious abnormality, Potassium 3.2 (L) (11/13/2017) 3.7 - 5.3 mmol/L 3.2 (L) (11/6/2017) 3.7 - 5.3 mmol/L   Sodium 138 (11/13/2017) 135 - 144 mmol/L 142 (11/6/2017) 135 - 144 mmol/L       LFT  Results in Past 30 Days  Result Component Current Result Ref Range Previous Result Ref Range   Alb 3.7 (11/27/2017) 3.5 - 5.2 g/dL 4.0 (11/20/2017) 3.5 - 5.2 g/dL   Alkaline Phosphatase 81 (11/27/2017) 35 - 104 U/L 80 (11/20/2017) 35 - 104 U/L   ALT 9 (11/27/2017) 5 - 33 U/L 9 (11/20/2017) 5 - 33 U/L   AST 15 (11/27/2017) <32 U/L 13 (11/20/2017) <32 U/L   Bilirubin, Direct <0.08 (11/27/2017) <0.31 mg/dL <0.08 (11/20/2017) <0.31 mg/dL   Total Bilirubin 0.29 (L) (11/27/2017) 0.3 - 1.2 mg/dL 0.25 (L) (11/20/2017) 0.3 - 1.2 mg/dL   Total Protein 7.0 (11/27/2017) 6.4 - 8.3 g/dL 7.0 (11/20/2017) 6.4 - 8.3 g/dL          IMAGING:      MRI lumber   Impression:     Suspect diskitis and osteomyelitis at the L3-L4 level. There is suspected inflammation and swelling in the paravertebral soft tissues and also around the thecal sac and in the posterior soft tissues. There is an irregular fluid collection with rim    enhancement at the laminectomy site at L3 and L4. This is suspicious for abscess. Enhancement around the thecal sac may represent inflammatory changes but developing phlegmon or abscess is not excluded. Meningitis is also not excluded and should be    correlated clinically. Differential diagnosis includes postsurgical changes. The right enhancing fluid collection may represent a postsurgical seroma or hematoma.              Misti Srivastava MD  11/29/2017  10:48 PM      This note was completed using a voice transcription system. Every effort was made to ensure accuracy. However, inadvertent computerized transcription errors may be present.

## 2017-12-01 ENCOUNTER — HOSPITAL ENCOUNTER (OUTPATIENT)
Age: 77
Setting detail: SPECIMEN
Discharge: HOME | End: 2017-12-01
Payer: MEDICARE

## 2017-12-01 LAB
ABSOLUTE EOS #: 0.3 K/UL (ref 0–0.4)
ABSOLUTE IMMATURE GRANULOCYTE: ABNORMAL K/UL (ref 0–0.3)
ABSOLUTE LYMPH #: 1.3 K/UL (ref 1–4.8)
ABSOLUTE MONO #: 0.2 K/UL (ref 0–1)
ALBUMIN SERPL-MCNC: 3.8 G/DL (ref 3.5–5.2)
ALBUMIN/GLOBULIN RATIO: 1.2 (ref 1–2.5)
ALP BLD-CCNC: 83 U/L (ref 35–104)
ALT SERPL-CCNC: 8 U/L (ref 5–33)
AST SERPL-CCNC: 15 U/L
BASOPHILS # BLD: 1 % (ref 0–2)
BASOPHILS ABSOLUTE: 0 K/UL (ref 0–0.2)
BILIRUB SERPL-MCNC: 0.2 MG/DL (ref 0.3–1.2)
BILIRUBIN DIRECT: <0.08 MG/DL
BILIRUBIN, INDIRECT: ABNORMAL MG/DL (ref 0–1)
CREAT SERPL-MCNC: 0.83 MG/DL (ref 0.5–0.9)
DIFFERENTIAL TYPE: ABNORMAL
EOSINOPHILS RELATIVE PERCENT: 6 % (ref 0–8)
GFR AFRICAN AMERICAN: >60 ML/MIN
GFR NON-AFRICAN AMERICAN: >60 ML/MIN
GFR SERPL CREATININE-BSD FRML MDRD: NORMAL ML/MIN/{1.73_M2}
GFR SERPL CREATININE-BSD FRML MDRD: NORMAL ML/MIN/{1.73_M2}
GLOBULIN: ABNORMAL G/DL (ref 1.5–3.8)
HCT VFR BLD CALC: 35.9 % (ref 36–46)
HEMOGLOBIN: 11.4 G/DL (ref 12–16)
IMMATURE GRANULOCYTES: ABNORMAL %
LYMPHOCYTES # BLD: 21 % (ref 24–44)
MCH RBC QN AUTO: 26.6 PG (ref 26–34)
MCHC RBC AUTO-ENTMCNC: 31.9 G/DL (ref 31–37)
MCV RBC AUTO: 83.5 FL (ref 80–100)
MONOCYTES # BLD: 4 % (ref 0–12)
PDW BLD-RTO: 16 % (ref 12.1–15.2)
PLATELET # BLD: 324 K/UL (ref 140–450)
PLATELET ESTIMATE: ABNORMAL
PMV BLD AUTO: 8.7 FL (ref 6–12)
RBC # BLD: 4.3 M/UL (ref 4–5.2)
RBC # BLD: ABNORMAL 10*6/UL
SEG NEUTROPHILS: 68 % (ref 36–66)
SEGMENTED NEUTROPHILS ABSOLUTE COUNT: 4.2 K/UL (ref 1.8–7.7)
TOTAL PROTEIN: 7 G/DL (ref 6.4–8.3)
VANCOMYCIN TROUGH DATE LAST DOSE: NORMAL
VANCOMYCIN TROUGH DOSE AMOUNT: 1
VANCOMYCIN TROUGH TIME LAST DOSE: 830
VANCOMYCIN TROUGH: 10.4 UG/ML (ref 10–20)
WBC # BLD: 6.1 K/UL (ref 3.5–11)
WBC # BLD: ABNORMAL 10*3/UL

## 2017-12-01 PROCEDURE — 85025 COMPLETE CBC W/AUTO DIFF WBC: CPT

## 2017-12-01 PROCEDURE — 82565 ASSAY OF CREATININE: CPT

## 2017-12-01 PROCEDURE — 80076 HEPATIC FUNCTION PANEL: CPT

## 2017-12-01 PROCEDURE — 80202 ASSAY OF VANCOMYCIN: CPT

## 2017-12-05 ENCOUNTER — HOSPITAL ENCOUNTER (OUTPATIENT)
Age: 77
Setting detail: SPECIMEN
Discharge: HOME | End: 2017-12-05
Payer: MEDICARE

## 2017-12-05 ENCOUNTER — HOSPITAL ENCOUNTER (OUTPATIENT)
Dept: PHARMACY | Age: 77
Setting detail: THERAPIES SERIES
Discharge: HOME | End: 2017-12-05
Payer: MEDICARE

## 2017-12-05 VITALS — BODY MASS INDEX: 30.73 KG/M2 | WEIGHT: 179 LBS

## 2017-12-05 DIAGNOSIS — Z79.01 LONG TERM CURRENT USE OF ANTICOAGULANT THERAPY: ICD-10-CM

## 2017-12-05 LAB
ABSOLUTE EOS #: 0.4 K/UL (ref 0–0.4)
ABSOLUTE IMMATURE GRANULOCYTE: ABNORMAL K/UL (ref 0–0.3)
ABSOLUTE LYMPH #: 1.3 K/UL (ref 1–4.8)
ABSOLUTE MONO #: 0.4 K/UL (ref 0–1)
ALBUMIN SERPL-MCNC: 3.7 G/DL (ref 3.5–5.2)
ALBUMIN/GLOBULIN RATIO: 1.1 (ref 1–2.5)
ALP BLD-CCNC: 84 U/L (ref 35–104)
ALT SERPL-CCNC: 9 U/L (ref 5–33)
AST SERPL-CCNC: 17 U/L
BASOPHILS # BLD: 1 % (ref 0–2)
BASOPHILS ABSOLUTE: 0 K/UL (ref 0–0.2)
BILIRUB SERPL-MCNC: 0.19 MG/DL (ref 0.3–1.2)
BILIRUBIN DIRECT: <0.08 MG/DL
BILIRUBIN, INDIRECT: ABNORMAL MG/DL (ref 0–1)
CREAT SERPL-MCNC: 0.79 MG/DL (ref 0.5–0.9)
DIFFERENTIAL TYPE: ABNORMAL
EOSINOPHILS RELATIVE PERCENT: 5 % (ref 0–8)
GFR AFRICAN AMERICAN: >60 ML/MIN
GFR NON-AFRICAN AMERICAN: >60 ML/MIN
GFR SERPL CREATININE-BSD FRML MDRD: NORMAL ML/MIN/{1.73_M2}
GFR SERPL CREATININE-BSD FRML MDRD: NORMAL ML/MIN/{1.73_M2}
GLOBULIN: ABNORMAL G/DL (ref 1.5–3.8)
HCT VFR BLD CALC: 34.7 % (ref 36–46)
HEMOGLOBIN: 11.2 G/DL (ref 12–16)
IMMATURE GRANULOCYTES: ABNORMAL %
INR BLD: 3.7
LYMPHOCYTES # BLD: 20 % (ref 24–44)
MCH RBC QN AUTO: 26.8 PG (ref 26–34)
MCHC RBC AUTO-ENTMCNC: 32.2 G/DL (ref 31–37)
MCV RBC AUTO: 83.3 FL (ref 80–100)
MONOCYTES # BLD: 6 % (ref 0–12)
PDW BLD-RTO: 15.7 % (ref 12.1–15.2)
PLATELET # BLD: 313 K/UL (ref 140–450)
PLATELET ESTIMATE: ABNORMAL
PMV BLD AUTO: 8.6 FL (ref 6–12)
RBC # BLD: 4.16 M/UL (ref 4–5.2)
RBC # BLD: ABNORMAL 10*6/UL
SEG NEUTROPHILS: 68 % (ref 36–66)
SEGMENTED NEUTROPHILS ABSOLUTE COUNT: 4.4 K/UL (ref 1.8–7.7)
TOTAL PROTEIN: 7.2 G/DL (ref 6.4–8.3)
VANCOMYCIN TROUGH DATE LAST DOSE: NORMAL
VANCOMYCIN TROUGH DOSE AMOUNT: NORMAL
VANCOMYCIN TROUGH TIME LAST DOSE: NORMAL
VANCOMYCIN TROUGH: 11.3 UG/ML (ref 10–20)
WBC # BLD: 6.5 K/UL (ref 3.5–11)
WBC # BLD: ABNORMAL 10*3/UL

## 2017-12-05 PROCEDURE — 99211 OFF/OP EST MAY X REQ PHY/QHP: CPT

## 2017-12-05 PROCEDURE — 82565 ASSAY OF CREATININE: CPT

## 2017-12-05 PROCEDURE — 85610 PROTHROMBIN TIME: CPT

## 2017-12-05 PROCEDURE — 80202 ASSAY OF VANCOMYCIN: CPT

## 2017-12-05 PROCEDURE — 85025 COMPLETE CBC W/AUTO DIFF WBC: CPT

## 2017-12-05 PROCEDURE — 80076 HEPATIC FUNCTION PANEL: CPT

## 2017-12-05 NOTE — PROGRESS NOTES
Patient believes she forgot to take a half tab this past Friday (she took 7.5 mg instead of 3.75 mg). Total warfarin dose from past 7 days=48.75 mg. Patient states no visible blood in urine and no black tarry stool. No change in other medications. Will have patient hold warfarin today. Patient will decrease current dose of warfarin as directed. Will return to clinic in 1.5 weeks.

## 2017-12-05 NOTE — PATIENT INSTRUCTIONS
Please do not take your warfarin today. Continue to monitor urine and stool. Decrease warfarin as directed. Return to clinic in 2 weeks.

## 2017-12-08 ENCOUNTER — HOSPITAL ENCOUNTER (OUTPATIENT)
Age: 77
Setting detail: SPECIMEN
Discharge: HOME | End: 2017-12-08
Payer: MEDICARE

## 2017-12-08 ENCOUNTER — TELEPHONE (OUTPATIENT)
Dept: INFECTIOUS DISEASES | Age: 77
End: 2017-12-08

## 2017-12-08 LAB
ABSOLUTE EOS #: 0.3 K/UL (ref 0–0.4)
ABSOLUTE IMMATURE GRANULOCYTE: ABNORMAL K/UL (ref 0–0.3)
ABSOLUTE LYMPH #: 1.3 K/UL (ref 1–4.8)
ABSOLUTE MONO #: 0.4 K/UL (ref 0–1)
ALBUMIN SERPL-MCNC: 3.9 G/DL (ref 3.5–5.2)
ALBUMIN/GLOBULIN RATIO: 1.3 (ref 1–2.5)
ALP BLD-CCNC: 86 U/L (ref 35–104)
ALT SERPL-CCNC: 9 U/L (ref 5–33)
AST SERPL-CCNC: 14 U/L
BASOPHILS # BLD: 1 % (ref 0–2)
BASOPHILS ABSOLUTE: 0 K/UL (ref 0–0.2)
BILIRUB SERPL-MCNC: 0.19 MG/DL (ref 0.3–1.2)
BILIRUBIN DIRECT: <0.08 MG/DL
BILIRUBIN, INDIRECT: ABNORMAL MG/DL (ref 0–1)
CREAT SERPL-MCNC: 0.77 MG/DL (ref 0.5–0.9)
DIFFERENTIAL TYPE: ABNORMAL
EOSINOPHILS RELATIVE PERCENT: 5 % (ref 0–8)
GFR AFRICAN AMERICAN: >60 ML/MIN
GFR NON-AFRICAN AMERICAN: >60 ML/MIN
GFR SERPL CREATININE-BSD FRML MDRD: NORMAL ML/MIN/{1.73_M2}
GFR SERPL CREATININE-BSD FRML MDRD: NORMAL ML/MIN/{1.73_M2}
GLOBULIN: ABNORMAL G/DL (ref 1.5–3.8)
HCT VFR BLD CALC: 35.5 % (ref 36–46)
HEMOGLOBIN: 11.5 G/DL (ref 12–16)
IMMATURE GRANULOCYTES: ABNORMAL %
LYMPHOCYTES # BLD: 19 % (ref 24–44)
MCH RBC QN AUTO: 26.8 PG (ref 26–34)
MCHC RBC AUTO-ENTMCNC: 32.3 G/DL (ref 31–37)
MCV RBC AUTO: 83 FL (ref 80–100)
MONOCYTES # BLD: 6 % (ref 0–12)
PDW BLD-RTO: 16.2 % (ref 12.1–15.2)
PLATELET # BLD: 311 K/UL (ref 140–450)
PLATELET ESTIMATE: ABNORMAL
PMV BLD AUTO: 8.7 FL (ref 6–12)
RBC # BLD: 4.28 M/UL (ref 4–5.2)
RBC # BLD: ABNORMAL 10*6/UL
SEG NEUTROPHILS: 69 % (ref 36–66)
SEGMENTED NEUTROPHILS ABSOLUTE COUNT: 4.7 K/UL (ref 1.8–7.7)
TOTAL PROTEIN: 7 G/DL (ref 6.4–8.3)
VANCOMYCIN TROUGH DATE LAST DOSE: NORMAL
VANCOMYCIN TROUGH DOSE AMOUNT: NORMAL
VANCOMYCIN TROUGH TIME LAST DOSE: NORMAL
VANCOMYCIN TROUGH: 13.2 UG/ML (ref 10–20)
WBC # BLD: 6.7 K/UL (ref 3.5–11)
WBC # BLD: ABNORMAL 10*3/UL

## 2017-12-08 PROCEDURE — 80202 ASSAY OF VANCOMYCIN: CPT

## 2017-12-08 PROCEDURE — 85025 COMPLETE CBC W/AUTO DIFF WBC: CPT

## 2017-12-08 PROCEDURE — 82565 ASSAY OF CREATININE: CPT

## 2017-12-08 PROCEDURE — 80076 HEPATIC FUNCTION PANEL: CPT

## 2017-12-08 NOTE — TELEPHONE ENCOUNTER
Dr Shaneka Menendez [facility] is a radiologist who puts in PICC lines and is out until Monday. The radiologist that is covering does not put in PICC lines, nor can her schedule for her. Patient states the line is fine essentially, its just hard to get blood from it (they are doing venous sticks) and it infuses slow and flushes slow but is still working. Patient is aware that PICC l arslan dont get placed out patient on the weekends and she isnt prepared to drive to [place] IF they could in the next few hours. She is going to monitor it over the weeked, I made her aware that if anything STOPS working then go to the ER and they will take it from there. Will work on first thing Monday morning.

## 2017-12-08 NOTE — TELEPHONE ENCOUNTER
I received a call from her home health nurse stating that her back surgeon DR Olivares? Wants to speak to Dr Sparkle Painting. I will forward a message via [address] [place] [address] [phone]    Also stating the PICC line in her right arm is extremely irritated, also sluggish and slow to infuse and flush. They want it replaced in her other arm at [facility]. Will also send a message to DR Sparkle Painting in [address] [place] [address] regarding this.

## 2017-12-11 ENCOUNTER — HOSPITAL ENCOUNTER (OUTPATIENT)
Age: 77
Setting detail: SPECIMEN
Discharge: HOME | End: 2017-12-11
Payer: MEDICARE

## 2017-12-11 LAB
ABSOLUTE EOS #: 0.3 K/UL (ref 0–0.4)
ABSOLUTE IMMATURE GRANULOCYTE: ABNORMAL K/UL (ref 0–0.3)
ABSOLUTE LYMPH #: 1.3 K/UL (ref 1–4.8)
ABSOLUTE MONO #: 0.3 K/UL (ref 0–1)
ALBUMIN SERPL-MCNC: 4 G/DL (ref 3.5–5.2)
ALBUMIN/GLOBULIN RATIO: 1.3 (ref 1–2.5)
ALP BLD-CCNC: 86 U/L (ref 35–104)
ALT SERPL-CCNC: 11 U/L (ref 5–33)
AST SERPL-CCNC: 16 U/L
BASOPHILS # BLD: 1 % (ref 0–2)
BASOPHILS ABSOLUTE: 0 K/UL (ref 0–0.2)
BILIRUB SERPL-MCNC: 0.19 MG/DL (ref 0.3–1.2)
BILIRUBIN DIRECT: <0.08 MG/DL
BILIRUBIN, INDIRECT: ABNORMAL MG/DL (ref 0–1)
CREAT SERPL-MCNC: 0.83 MG/DL (ref 0.5–0.9)
DIFFERENTIAL TYPE: ABNORMAL
EOSINOPHILS RELATIVE PERCENT: 5 % (ref 0–8)
GFR AFRICAN AMERICAN: >60 ML/MIN
GFR NON-AFRICAN AMERICAN: >60 ML/MIN
GFR SERPL CREATININE-BSD FRML MDRD: NORMAL ML/MIN/{1.73_M2}
GFR SERPL CREATININE-BSD FRML MDRD: NORMAL ML/MIN/{1.73_M2}
GLOBULIN: ABNORMAL G/DL (ref 1.5–3.8)
HCT VFR BLD CALC: 37.3 % (ref 36–46)
HEMOGLOBIN: 12 G/DL (ref 12–16)
IMMATURE GRANULOCYTES: ABNORMAL %
LYMPHOCYTES # BLD: 20 % (ref 24–44)
MCH RBC QN AUTO: 26.8 PG (ref 26–34)
MCHC RBC AUTO-ENTMCNC: 32.2 G/DL (ref 31–37)
MCV RBC AUTO: 83.1 FL (ref 80–100)
MONOCYTES # BLD: 4 % (ref 0–12)
PDW BLD-RTO: 16.6 % (ref 12.1–15.2)
PLATELET # BLD: 317 K/UL (ref 140–450)
PLATELET ESTIMATE: ABNORMAL
PMV BLD AUTO: 8.5 FL (ref 6–12)
RBC # BLD: 4.49 M/UL (ref 4–5.2)
RBC # BLD: ABNORMAL 10*6/UL
SEG NEUTROPHILS: 70 % (ref 36–66)
SEGMENTED NEUTROPHILS ABSOLUTE COUNT: 4.7 K/UL (ref 1.8–7.7)
TOTAL PROTEIN: 7.1 G/DL (ref 6.4–8.3)
VANCOMYCIN TROUGH DATE LAST DOSE: NORMAL
VANCOMYCIN TROUGH DOSE AMOUNT: NORMAL
VANCOMYCIN TROUGH TIME LAST DOSE: NORMAL
VANCOMYCIN TROUGH: 13.1 UG/ML (ref 10–20)
WBC # BLD: 6.6 K/UL (ref 3.5–11)
WBC # BLD: ABNORMAL 10*3/UL

## 2017-12-11 PROCEDURE — 80076 HEPATIC FUNCTION PANEL: CPT

## 2017-12-11 PROCEDURE — 80202 ASSAY OF VANCOMYCIN: CPT

## 2017-12-11 PROCEDURE — 85025 COMPLETE CBC W/AUTO DIFF WBC: CPT

## 2017-12-11 PROCEDURE — 82565 ASSAY OF CREATININE: CPT

## 2017-12-13 ENCOUNTER — TELEPHONE (OUTPATIENT)
Dept: INFECTIOUS DISEASES | Age: 77
End: 2017-12-13

## 2017-12-14 ENCOUNTER — OFFICE VISIT (OUTPATIENT)
Dept: INFECTIOUS DISEASES | Age: 77
End: 2017-12-14
Payer: MEDICARE

## 2017-12-14 ENCOUNTER — TELEPHONE (OUTPATIENT)
Dept: INFECTIOUS DISEASES | Age: 77
End: 2017-12-14

## 2017-12-14 VITALS
TEMPERATURE: 97.1 F | HEART RATE: 82 BPM | DIASTOLIC BLOOD PRESSURE: 84 MMHG | WEIGHT: 179.8 LBS | RESPIRATION RATE: 14 BRPM | OXYGEN SATURATION: 98 % | BODY MASS INDEX: 31.86 KG/M2 | SYSTOLIC BLOOD PRESSURE: 153 MMHG | HEIGHT: 63 IN

## 2017-12-14 DIAGNOSIS — G06.2 EPIDURAL ABSCESS: Primary | ICD-10-CM

## 2017-12-14 PROCEDURE — 99213 OFFICE O/P EST LOW 20 MIN: CPT | Performed by: INTERNAL MEDICINE

## 2017-12-14 RX ORDER — ACETAMINOPHEN 500 MG
500 TABLET ORAL EVERY 4 HOURS PRN
COMMUNITY

## 2017-12-14 RX ORDER — TRAMADOL HYDROCHLORIDE 50 MG/1
50 TABLET ORAL PRN
COMMUNITY
End: 2018-08-30

## 2017-12-14 RX ORDER — DOXYCYCLINE HYCLATE 100 MG
100 TABLET ORAL 2 TIMES DAILY
Qty: 60 TABLET | Refills: 0 | Status: SHIPPED | OUTPATIENT
Start: 2017-12-14 | End: 2018-01-13

## 2017-12-14 NOTE — TELEPHONE ENCOUNTER
Javi Miranda from [facility] called me back and I gave her verbal orders to d/c picc line.

## 2017-12-14 NOTE — TELEPHONE ENCOUNTER
D/c picc line   Please contact coumadin doctor to order PT/INR every other day x4 wks     I spoke with Dr. Herve Bean office and she said they will put in the orders to patient's home care [facility]). I called [facility] Lizzy I left a voicemail for Tavia Zaragoza her . My phone line number was provided and my name.

## 2017-12-15 ENCOUNTER — ANTI-COAG VISIT (OUTPATIENT)
Dept: PHARMACY | Age: 77
End: 2017-12-15

## 2017-12-15 DIAGNOSIS — Z79.01 LONG TERM CURRENT USE OF ANTICOAGULANT THERAPY: ICD-10-CM

## 2017-12-15 NOTE — PROGRESS NOTES
mouth daily ), Disp: 90 tablet, Rfl: 3    warfarin (COUMADIN) 7.5 MG tablet, Take 1 tablet by mouth daily, Disp: 30 tablet, Rfl: 6     Review of Systems:  CONSTITUTIONAL:  negative  EYES:  negative  HEENT:  negative  RESPIRATORY:  negative  CARDIOVASCULAR:  negative  GASTROINTESTINAL:  negative  GENITOURINARY:  negative  INTEGUMENT/BREAST:  negative  HEMATOLOGIC/LYMPHATIC:  negative  ALLERGIC/IMMUNOLOGIC:  negative  ENDOCRINE:  negative  MUSCULOSKELETAL:  Back pain improving   NEUROLOGICAL:  negative  BEHAVIOR/PSYCH:  negative    BP (!) 153/84 (Site: Left Arm, Position: Sitting)   Pulse 82   Temp 97.1 °F (36.2 °C)   Resp 14   Ht 5' 3\" (1.6 m)   Wt 179 lb 12.8 oz (81.6 kg)   SpO2 98% Comment: Room air  BMI 31.85 kg/m²       EXAM:  CONSTITUTIONAL:  awake, alert, cooperative, no apparent distress,  EYES: conjunctiva normal  ENT:  Normocephalic, without obvious abnormality, atraumatic,  LUNGS:  No increased work of breathing, good air exchange, clear to auscultation bilaterally, no crackles or wheezing  CARDIOVASCULAR:  regular rate and rhythm, normal S1 and S2,  no murmur noted  ABDOMEN:   normal bowel sounds, soft, non-distended, non-tender, no masses palpated, no hepatosplenomegally,   MUSCULOSKELETAL:  There is no redness, warmth, or swelling of the joints.     NEUROLOGIC:  Awake, alert, oriented to name, place and time,nonfocal  SKIN:  No rash      Data Review:    CBC with Diff  Results in Past 30 Days  Result Component Current Result Ref Range Previous Result Ref Range   Absolute Eos # 0.30 (12/11/2017) 0.0 - 0.4 k/uL 0.30 (12/8/2017) 0.0 - 0.4 k/uL   Absolute Lymph # 1.30 (12/11/2017) 1.0 - 4.8 k/uL 1.30 (12/8/2017) 1.0 - 4.8 k/uL   Absolute Mono # 0.30 (12/11/2017) 0.0 - 1.0 k/uL 0.40 (12/8/2017) 0.0 - 1.0 k/uL   Basophils # 0.00 (12/11/2017) 0.0 - 0.2 k/uL 0.00 (12/8/2017) 0.0 - 0.2 k/uL   Differential Type NOT REPORTED (12/11/2017)  NOT REPORTED (12/8/2017)    Hematocrit 37.3 (12/11/2017) 36 - 46 % 35.5 (L) (12/8/2017) 36 - 46 %   Hemoglobin 12.0 (12/11/2017) 12.0 - 16.0 g/dL 11.5 (L) (12/8/2017) 12.0 - 16.0 g/dL   Lymphocytes 20 (L) (12/11/2017) 24 - 44 % 19 (L) (12/8/2017) 24 - 44 %   Platelets 960 (75/06/0104) 140 - 450 k/uL 311 (12/8/2017) 140 - 450 k/uL   WBC 6.6 (12/11/2017) 3.5 - 11.0 k/uL 6.7 (12/8/2017) 3.5 - 11.0 k/uL       BMP  Results in Past 30 Days  Result Component Current Result Ref Range Previous Result Ref Range   CREATININE 0.83 (12/11/2017) 0.50 - 0.90 mg/dL 0.77 (12/8/2017) 0.50 - 0.90 mg/dL       LFT  Results in Past 30 Days  Result Component Current Result Ref Range Previous Result Ref Range   Alb 4.0 (12/11/2017) 3.5 - 5.2 g/dL 3.9 (12/8/2017) 3.5 - 5.2 g/dL   Alkaline Phosphatase 86 (12/11/2017) 35 - 104 U/L 86 (12/8/2017) 35 - 104 U/L   ALT 11 (12/11/2017) 5 - 33 U/L 9 (12/8/2017) 5 - 33 U/L   AST 16 (12/11/2017) <32 U/L 14 (12/8/2017) <32 U/L   Bilirubin, Direct <0.08 (12/11/2017) <0.31 mg/dL <0.08 (12/8/2017) <0.31 mg/dL   Total Bilirubin 0.19 (L) (12/11/2017) 0.3 - 1.2 mg/dL 0.19 (L) (12/8/2017) 0.3 - 1.2 mg/dL   Total Protein 7.1 (12/11/2017) 6.4 - 8.3 g/dL 7.0 (12/8/2017) 6.4 - 8.3 g/dL        MICRO:    IMAGING:          Montez Mg MD  12/14/2017  9:02 PM      This note was completed using a voice transcription system. Every effort was made to ensure accuracy. However, inadvertent computerized transcription errors may be present.

## 2017-12-18 ENCOUNTER — HOSPITAL ENCOUNTER (OUTPATIENT)
Dept: PHARMACY | Age: 77
Setting detail: THERAPIES SERIES
Discharge: HOME | End: 2017-12-18
Payer: MEDICARE

## 2017-12-18 VITALS
BODY MASS INDEX: 31.64 KG/M2 | HEART RATE: 93 BPM | WEIGHT: 178.6 LBS | SYSTOLIC BLOOD PRESSURE: 127 MMHG | DIASTOLIC BLOOD PRESSURE: 76 MMHG

## 2017-12-18 DIAGNOSIS — Z79.01 LONG TERM CURRENT USE OF ANTICOAGULANT THERAPY: ICD-10-CM

## 2017-12-18 LAB — INR BLD: 3.6

## 2017-12-18 PROCEDURE — 85610 PROTHROMBIN TIME: CPT

## 2017-12-18 PROCEDURE — 99211 OFF/OP EST MAY X REQ PHY/QHP: CPT

## 2017-12-18 NOTE — PATIENT INSTRUCTIONS
Please hold warfarin dose today. Take warfarin 7.5 mg tomorrow, 3.75 mg Wednesday and 7.5 mg on Thursday. Return to clinic to recheck INR on Friday. Continue to monitor urine and stool for signs and symptoms of bleeding. Please notify the clinic of any medication changes. Please remember to bring all medications (both prescription and OTC) to your next visit. Kindly notify the clinic if you are unable to make to your next appointment.

## 2017-12-18 NOTE — PROGRESS NOTES
Fingerstick INR drawn per clinic protocol. Patient states no visible blood in urine and no black tarry stool. Denies any missed doses of warfarin. No change in other maintenance medications or in diet. Vancomycin was discontinued. Patient started taking doxycycline BID for 1 month last Thursday, 12/14/17. Patient states her infectious disease doctor recommended that she have INR checked MWF for the next four weeks, however, patient is not willing to do that as she feels monitoring that frequently is not necessary. She is agreeable to return to clinic in 4 days on Friday. INR is 3.6 today. Patient instructed to hold warfarin today and take warfarin 3.75 mg on Wednesday. Patient will return to clinic on Friday to recheck INR.

## 2017-12-22 ENCOUNTER — HOSPITAL ENCOUNTER (OUTPATIENT)
Dept: PHARMACY | Age: 77
Setting detail: THERAPIES SERIES
Discharge: HOME | End: 2017-12-22
Payer: MEDICARE

## 2017-12-22 VITALS
WEIGHT: 181 LBS | HEART RATE: 88 BPM | BODY MASS INDEX: 32.06 KG/M2 | SYSTOLIC BLOOD PRESSURE: 153 MMHG | DIASTOLIC BLOOD PRESSURE: 76 MMHG

## 2017-12-22 DIAGNOSIS — Z79.01 LONG TERM CURRENT USE OF ANTICOAGULANT THERAPY: ICD-10-CM

## 2017-12-22 LAB — INR BLD: 1.7

## 2017-12-22 PROCEDURE — 85610 PROTHROMBIN TIME: CPT

## 2017-12-22 PROCEDURE — 99211 OFF/OP EST MAY X REQ PHY/QHP: CPT

## 2017-12-22 NOTE — PATIENT INSTRUCTIONS
Continue to monitor urine and stool. Continue to monitor for signs of bleeding. Return to clinic on Wednesday. Take 7.5 mg of warfarin today and continue to take a half tablet (3.75 mg every Monday and 7.5 mg all other days.

## 2017-12-26 ENCOUNTER — HOSPITAL ENCOUNTER (OUTPATIENT)
Dept: PHARMACY | Age: 77
Setting detail: THERAPIES SERIES
Discharge: HOME | End: 2017-12-26
Payer: MEDICARE

## 2017-12-26 VITALS
HEART RATE: 78 BPM | WEIGHT: 179 LBS | BODY MASS INDEX: 31.71 KG/M2 | DIASTOLIC BLOOD PRESSURE: 71 MMHG | SYSTOLIC BLOOD PRESSURE: 141 MMHG

## 2017-12-26 DIAGNOSIS — Z79.01 LONG TERM CURRENT USE OF ANTICOAGULANT THERAPY: ICD-10-CM

## 2017-12-26 LAB — INR BLD: 1.8

## 2017-12-26 PROCEDURE — 99211 OFF/OP EST MAY X REQ PHY/QHP: CPT

## 2017-12-26 PROCEDURE — 85610 PROTHROMBIN TIME: CPT

## 2017-12-26 NOTE — PROGRESS NOTES
Patient states no visible blood in urine and no black tarry stool. No change in other medications. Patient stopped Vancomycin about a week and a half ago. Patient started Doxycycline oral about a week and a half ago. Patient states her infectious disease doctor recommended that she have INR checked MWF for the next four weeks, however, patient is not willing to do that as she feels monitoring that frequently is not necessary. Janelle Zuñiga is agreeable to return to clinic in a week. Patient may have taken 7.5 mg yesterday instead on half tablet (3.75 mg).

## 2017-12-27 ENCOUNTER — APPOINTMENT (OUTPATIENT)
Dept: PHARMACY | Age: 77
End: 2017-12-27
Payer: MEDICARE

## 2018-01-02 ENCOUNTER — HOSPITAL ENCOUNTER (OUTPATIENT)
Dept: PHARMACY | Age: 78
Setting detail: THERAPIES SERIES
Discharge: HOME | End: 2018-01-02
Payer: MEDICARE

## 2018-01-02 VITALS — SYSTOLIC BLOOD PRESSURE: 148 MMHG | DIASTOLIC BLOOD PRESSURE: 74 MMHG | HEART RATE: 71 BPM

## 2018-01-02 DIAGNOSIS — Z79.01 LONG TERM CURRENT USE OF ANTICOAGULANT THERAPY: ICD-10-CM

## 2018-01-02 LAB — INR BLD: 2.8

## 2018-01-02 PROCEDURE — 99211 OFF/OP EST MAY X REQ PHY/QHP: CPT

## 2018-01-02 PROCEDURE — 85610 PROTHROMBIN TIME: CPT

## 2018-01-05 ENCOUNTER — TELEPHONE (OUTPATIENT)
Dept: INFECTIOUS DISEASES | Age: 78
End: 2018-01-05

## 2018-01-08 ENCOUNTER — HOSPITAL ENCOUNTER (OUTPATIENT)
Dept: PHARMACY | Age: 78
Setting detail: THERAPIES SERIES
Discharge: HOME | End: 2018-01-08
Payer: MEDICARE

## 2018-01-08 ENCOUNTER — HOSPITAL ENCOUNTER (OUTPATIENT)
Age: 78
Discharge: HOME | End: 2018-01-08
Payer: MEDICARE

## 2018-01-08 VITALS
WEIGHT: 182 LBS | DIASTOLIC BLOOD PRESSURE: 84 MMHG | SYSTOLIC BLOOD PRESSURE: 161 MMHG | BODY MASS INDEX: 32.24 KG/M2 | HEART RATE: 103 BPM

## 2018-01-08 DIAGNOSIS — G06.2 EPIDURAL ABSCESS: ICD-10-CM

## 2018-01-08 LAB
ABSOLUTE EOS #: 0.3 K/UL (ref 0–0.4)
ABSOLUTE IMMATURE GRANULOCYTE: ABNORMAL K/UL (ref 0–0.3)
ABSOLUTE LYMPH #: 1.8 K/UL (ref 1–4.8)
ABSOLUTE MONO #: 0.7 K/UL (ref 0–1)
ALBUMIN SERPL-MCNC: 4.1 G/DL (ref 3.5–5.2)
ALBUMIN/GLOBULIN RATIO: 1.3 (ref 1–2.5)
ALP BLD-CCNC: 100 U/L (ref 35–104)
ALT SERPL-CCNC: 9 U/L (ref 5–33)
AST SERPL-CCNC: 14 U/L
BASOPHILS # BLD: 1 % (ref 0–2)
BASOPHILS ABSOLUTE: 0.1 K/UL (ref 0–0.2)
BILIRUB SERPL-MCNC: 0.15 MG/DL (ref 0.3–1.2)
BILIRUBIN DIRECT: <0.08 MG/DL
BILIRUBIN, INDIRECT: ABNORMAL MG/DL (ref 0–1)
CREAT SERPL-MCNC: 1 MG/DL (ref 0.5–0.9)
DIFFERENTIAL TYPE: ABNORMAL
EOSINOPHILS RELATIVE PERCENT: 3 % (ref 0–8)
GFR AFRICAN AMERICAN: >60 ML/MIN
GFR NON-AFRICAN AMERICAN: 54 ML/MIN
GFR SERPL CREATININE-BSD FRML MDRD: ABNORMAL ML/MIN/{1.73_M2}
GFR SERPL CREATININE-BSD FRML MDRD: ABNORMAL ML/MIN/{1.73_M2}
GLOBULIN: ABNORMAL G/DL (ref 1.5–3.8)
IMMATURE GRANULOCYTES: ABNORMAL %
INR BLD: 2.9
LYMPHOCYTES # BLD: 20 % (ref 24–44)
MONOCYTES # BLD: 8 % (ref 0–12)
PLATELET # BLD: 307 K/UL (ref 140–450)
PLATELET ESTIMATE: ABNORMAL
RBC # BLD: ABNORMAL 10*6/UL
SEG NEUTROPHILS: 68 % (ref 36–66)
SEGMENTED NEUTROPHILS ABSOLUTE COUNT: 6 K/UL (ref 1.8–7.7)
TOTAL PROTEIN: 7.3 G/DL (ref 6.4–8.3)
WBC # BLD: 8.8 K/UL (ref 3.5–11)
WBC # BLD: ABNORMAL 10*3/UL

## 2018-01-08 PROCEDURE — 82565 ASSAY OF CREATININE: CPT

## 2018-01-08 PROCEDURE — 85610 PROTHROMBIN TIME: CPT

## 2018-01-08 PROCEDURE — 85049 AUTOMATED PLATELET COUNT: CPT

## 2018-01-08 PROCEDURE — 85048 AUTOMATED LEUKOCYTE COUNT: CPT

## 2018-01-08 PROCEDURE — 99211 OFF/OP EST MAY X REQ PHY/QHP: CPT

## 2018-01-08 PROCEDURE — 80076 HEPATIC FUNCTION PANEL: CPT

## 2018-01-08 PROCEDURE — 36415 COLL VENOUS BLD VENIPUNCTURE: CPT

## 2018-01-08 NOTE — PROGRESS NOTES
Fingerstick INR drawn per clinic protocol. Patient states no visible blood in urine and no black tarry stool. Denies any missed doses of warfarin. No change in other maintenance medications or in diet. Patient started doxycycline oral 12/14/18 for 30 days -- end 1/13/18. Patient is having blood work as ordered per infectious disease physician drawn after this appointment. Patient will contact clinic if there are any changes to her medications - after evaluation of lab results per ID physician. Will continue current warfarin regimen and recheck INR in 2 week(s).

## 2018-01-08 NOTE — TELEPHONE ENCOUNTER
I spoke with patient and she asked that I fax the orders to [facility] because she is going there today for different blood work. I faxed orders to [phone].

## 2018-01-18 ENCOUNTER — TELEPHONE (OUTPATIENT)
Dept: INFECTIOUS DISEASES | Age: 78
End: 2018-01-18

## 2018-01-18 DIAGNOSIS — M46.46 DISCITIS OF LUMBAR REGION: Primary | ICD-10-CM

## 2018-01-23 ENCOUNTER — HOSPITAL ENCOUNTER (OUTPATIENT)
Dept: PHARMACY | Age: 78
Setting detail: THERAPIES SERIES
Discharge: HOME | End: 2018-01-23
Payer: MEDICARE

## 2018-01-23 VITALS
BODY MASS INDEX: 32.24 KG/M2 | WEIGHT: 182 LBS | DIASTOLIC BLOOD PRESSURE: 79 MMHG | SYSTOLIC BLOOD PRESSURE: 123 MMHG | HEART RATE: 79 BPM

## 2018-01-23 DIAGNOSIS — Z79.01 LONG TERM CURRENT USE OF ANTICOAGULANT THERAPY: ICD-10-CM

## 2018-01-23 LAB — INR BLD: 1.5

## 2018-01-23 PROCEDURE — 85610 PROTHROMBIN TIME: CPT

## 2018-01-23 PROCEDURE — 99211 OFF/OP EST MAY X REQ PHY/QHP: CPT

## 2018-01-23 NOTE — PATIENT INSTRUCTIONS
Continue to monitor urine and stool. Continue to monitor for signs of bleeding. Return to clinic in 2 weeks. Take 2 tablets (15 mg) of warfarin today and then resume warfarin 7.5 mg daily.

## 2018-01-23 NOTE — PROGRESS NOTES
Patient states no visible blood in urine and no black tarry stool. No change in other medications. Will return to clinic in 2 weeks. Stopped Doxycycline on 1/13/18. Patient is eating salads almost daily. Patient accidently cut herself and bled so she skipped a dose. Patient will take 2 tablets (15 mg) of warfarin today and then resume warfarin 7.5 mg daily.

## 2018-01-31 ENCOUNTER — HOSPITAL ENCOUNTER (OUTPATIENT)
Dept: LAB | Age: 78
Discharge: HOME | End: 2018-01-31
Payer: MEDICARE

## 2018-01-31 ENCOUNTER — HOSPITAL ENCOUNTER (OUTPATIENT)
Dept: MRI IMAGING | Age: 78
Discharge: HOME | End: 2018-02-02
Payer: MEDICARE

## 2018-01-31 DIAGNOSIS — M46.46 DISCITIS OF LUMBAR REGION: ICD-10-CM

## 2018-01-31 LAB
CREAT SERPL-MCNC: 1 MG/DL (ref 0.5–0.9)
GFR AFRICAN AMERICAN: >60 ML/MIN
GFR NON-AFRICAN AMERICAN: 54 ML/MIN
GFR SERPL CREATININE-BSD FRML MDRD: ABNORMAL ML/MIN/{1.73_M2}
GFR SERPL CREATININE-BSD FRML MDRD: ABNORMAL ML/MIN/{1.73_M2}

## 2018-01-31 PROCEDURE — A9579 GAD-BASE MR CONTRAST NOS,1ML: HCPCS | Performed by: INTERNAL MEDICINE

## 2018-01-31 PROCEDURE — 82565 ASSAY OF CREATININE: CPT

## 2018-01-31 PROCEDURE — 6360000004 HC RX CONTRAST MEDICATION: Performed by: INTERNAL MEDICINE

## 2018-01-31 PROCEDURE — 36415 COLL VENOUS BLD VENIPUNCTURE: CPT

## 2018-01-31 PROCEDURE — 72158 MRI LUMBAR SPINE W/O & W/DYE: CPT

## 2018-01-31 RX ADMIN — GADOTERIDOL 16 ML: 279.3 INJECTION, SOLUTION INTRAVENOUS at 10:03

## 2018-02-02 ENCOUNTER — TELEPHONE (OUTPATIENT)
Dept: PULMONOLOGY | Age: 78
End: 2018-02-02

## 2018-02-02 NOTE — TELEPHONE ENCOUNTER
Nancy, I'm going to have you follow up on this. I faxed records earlier this morning and now Edi Jeffery is asking the doctor to call and speak to the medical director to get the MRI approved. Dr Lorna Salas, please call #[phone]. Opt 4, then 2.  Ask for the medical director

## 2018-02-05 ENCOUNTER — TELEPHONE (OUTPATIENT)
Dept: INFECTIOUS DISEASES | Age: 78
End: 2018-02-05

## 2018-02-05 NOTE — TELEPHONE ENCOUNTER
I received a denial on patient's MRI Lumbar Spine. They will like you to do a peer to peer review you can call [phone] option 6.

## 2018-02-07 ENCOUNTER — TELEPHONE (OUTPATIENT)
Dept: INFECTIOUS DISEASES | Age: 78
End: 2018-02-07

## 2018-02-09 ENCOUNTER — HOSPITAL ENCOUNTER (OUTPATIENT)
Dept: PHARMACY | Age: 78
Setting detail: THERAPIES SERIES
Discharge: HOME | End: 2018-02-09
Payer: MEDICARE

## 2018-02-09 VITALS
SYSTOLIC BLOOD PRESSURE: 136 MMHG | DIASTOLIC BLOOD PRESSURE: 68 MMHG | BODY MASS INDEX: 32.42 KG/M2 | HEART RATE: 79 BPM | WEIGHT: 183 LBS

## 2018-02-09 DIAGNOSIS — Z79.01 LONG TERM CURRENT USE OF ANTICOAGULANT THERAPY: ICD-10-CM

## 2018-02-09 LAB — INR BLD: 2.5

## 2018-02-09 PROCEDURE — 85610 PROTHROMBIN TIME: CPT

## 2018-02-09 PROCEDURE — 99211 OFF/OP EST MAY X REQ PHY/QHP: CPT

## 2018-02-09 NOTE — PROGRESS NOTES
Patient states no visible blood in urine and no black tarry stool. No change in other medications. Patient is leaving in early March for a cruise out of [place]. Will return to clinic in 3 weeks.

## 2018-02-09 NOTE — PATIENT INSTRUCTIONS
Continue to monitor urine and stool. Continue to monitor for signs of bleeding. Return to clinic in 3 weeks.

## 2018-02-19 ENCOUNTER — HOSPITAL ENCOUNTER (OUTPATIENT)
Age: 78
Discharge: HOME | End: 2018-02-19
Payer: MEDICARE

## 2018-02-19 DIAGNOSIS — R73.01 IMPAIRED FASTING GLUCOSE: ICD-10-CM

## 2018-02-19 DIAGNOSIS — E78.2 MIXED HYPERLIPIDEMIA: ICD-10-CM

## 2018-02-19 LAB
ALT SERPL-CCNC: 11 U/L (ref 5–33)
ANION GAP SERPL CALCULATED.3IONS-SCNC: 10 MMOL/L (ref 9–17)
AST SERPL-CCNC: 15 U/L
BUN BLDV-MCNC: 22 MG/DL (ref 8–23)
BUN/CREAT BLD: 24 (ref 9–20)
CALCIUM SERPL-MCNC: 9.5 MG/DL (ref 8.6–10.4)
CHLORIDE BLD-SCNC: 106 MMOL/L (ref 98–107)
CHOLESTEROL/HDL RATIO: 2.9
CHOLESTEROL: 182 MG/DL
CO2: 26 MMOL/L (ref 20–31)
CREAT SERPL-MCNC: 0.9 MG/DL (ref 0.5–0.9)
ESTIMATED AVERAGE GLUCOSE: 111 MG/DL
GFR AFRICAN AMERICAN: >60 ML/MIN
GFR NON-AFRICAN AMERICAN: >60 ML/MIN
GFR SERPL CREATININE-BSD FRML MDRD: ABNORMAL ML/MIN/{1.73_M2}
GFR SERPL CREATININE-BSD FRML MDRD: ABNORMAL ML/MIN/{1.73_M2}
GLUCOSE BLD-MCNC: 101 MG/DL (ref 70–99)
HBA1C MFR BLD: 5.5 % (ref 4.8–5.9)
HDLC SERPL-MCNC: 62 MG/DL
LDL CHOLESTEROL: 87 MG/DL (ref 0–130)
POTASSIUM SERPL-SCNC: 4.8 MMOL/L (ref 3.7–5.3)
SODIUM BLD-SCNC: 142 MMOL/L (ref 135–144)
TRIGL SERPL-MCNC: 166 MG/DL
VLDLC SERPL CALC-MCNC: ABNORMAL MG/DL (ref 1–30)

## 2018-02-19 PROCEDURE — 84460 ALANINE AMINO (ALT) (SGPT): CPT

## 2018-02-19 PROCEDURE — 83036 HEMOGLOBIN GLYCOSYLATED A1C: CPT

## 2018-02-19 PROCEDURE — 80061 LIPID PANEL: CPT

## 2018-02-19 PROCEDURE — 84450 TRANSFERASE (AST) (SGOT): CPT

## 2018-02-19 PROCEDURE — 36415 COLL VENOUS BLD VENIPUNCTURE: CPT

## 2018-02-19 PROCEDURE — 80048 BASIC METABOLIC PNL TOTAL CA: CPT

## 2018-03-01 ENCOUNTER — OFFICE VISIT (OUTPATIENT)
Dept: INFECTIOUS DISEASES | Age: 78
End: 2018-03-01
Payer: MEDICARE

## 2018-03-01 VITALS
SYSTOLIC BLOOD PRESSURE: 125 MMHG | DIASTOLIC BLOOD PRESSURE: 79 MMHG | BODY MASS INDEX: 32.14 KG/M2 | RESPIRATION RATE: 16 BRPM | HEIGHT: 63 IN | HEART RATE: 81 BPM | WEIGHT: 181.4 LBS | TEMPERATURE: 97.2 F | OXYGEN SATURATION: 96 %

## 2018-03-01 DIAGNOSIS — M46.46 DISCITIS OF LUMBAR REGION: Primary | ICD-10-CM

## 2018-03-01 DIAGNOSIS — M46.26 OSTEOMYELITIS OF LUMBAR SPINE (HCC): ICD-10-CM

## 2018-03-01 PROCEDURE — 99213 OFFICE O/P EST LOW 20 MIN: CPT | Performed by: INTERNAL MEDICINE

## 2018-03-02 ENCOUNTER — HOSPITAL ENCOUNTER (OUTPATIENT)
Dept: PHARMACY | Age: 78
Setting detail: THERAPIES SERIES
Discharge: HOME | End: 2018-03-02
Payer: MEDICARE

## 2018-03-02 ENCOUNTER — HOSPITAL ENCOUNTER (OUTPATIENT)
Age: 78
Discharge: HOME | End: 2018-03-02
Payer: MEDICARE

## 2018-03-02 VITALS
HEART RATE: 71 BPM | DIASTOLIC BLOOD PRESSURE: 79 MMHG | WEIGHT: 184 LBS | SYSTOLIC BLOOD PRESSURE: 143 MMHG | BODY MASS INDEX: 32.59 KG/M2

## 2018-03-02 DIAGNOSIS — Z79.01 LONG TERM CURRENT USE OF ANTICOAGULANT THERAPY: ICD-10-CM

## 2018-03-02 DIAGNOSIS — M46.46 DISCITIS OF LUMBAR REGION: ICD-10-CM

## 2018-03-02 LAB
C-REACTIVE PROTEIN: 3.1 MG/L (ref 0–5)
INR BLD: 2.2
SEDIMENTATION RATE, ERYTHROCYTE: 23 MM (ref 0–20)

## 2018-03-02 PROCEDURE — 85610 PROTHROMBIN TIME: CPT

## 2018-03-02 PROCEDURE — 85651 RBC SED RATE NONAUTOMATED: CPT

## 2018-03-02 PROCEDURE — 99211 OFF/OP EST MAY X REQ PHY/QHP: CPT

## 2018-03-02 PROCEDURE — 86140 C-REACTIVE PROTEIN: CPT

## 2018-03-02 PROCEDURE — 36415 COLL VENOUS BLD VENIPUNCTURE: CPT

## 2018-05-07 ENCOUNTER — HOSPITAL ENCOUNTER (OUTPATIENT)
Dept: PHARMACY | Age: 78
Setting detail: THERAPIES SERIES
Discharge: HOME | End: 2018-05-07
Payer: MEDICARE

## 2018-05-07 VITALS
DIASTOLIC BLOOD PRESSURE: 72 MMHG | HEART RATE: 69 BPM | WEIGHT: 196.4 LBS | SYSTOLIC BLOOD PRESSURE: 138 MMHG | BODY MASS INDEX: 34.79 KG/M2

## 2018-05-07 DIAGNOSIS — Z79.01 LONG TERM CURRENT USE OF ANTICOAGULANT THERAPY: ICD-10-CM

## 2018-05-07 LAB — INR BLD: 2.5

## 2018-05-07 PROCEDURE — 99211 OFF/OP EST MAY X REQ PHY/QHP: CPT

## 2018-05-07 PROCEDURE — 85610 PROTHROMBIN TIME: CPT

## 2018-06-14 ENCOUNTER — OFFICE VISIT (OUTPATIENT)
Dept: INFECTIOUS DISEASES | Age: 78
End: 2018-06-14
Payer: MEDICARE

## 2018-06-14 VITALS
HEIGHT: 63 IN | HEART RATE: 73 BPM | WEIGHT: 200 LBS | RESPIRATION RATE: 14 BRPM | SYSTOLIC BLOOD PRESSURE: 152 MMHG | DIASTOLIC BLOOD PRESSURE: 90 MMHG | BODY MASS INDEX: 35.44 KG/M2 | OXYGEN SATURATION: 98 %

## 2018-06-14 DIAGNOSIS — M46.26 OSTEOMYELITIS OF LUMBAR SPINE (HCC): ICD-10-CM

## 2018-06-14 DIAGNOSIS — G06.2 EPIDURAL ABSCESS: ICD-10-CM

## 2018-06-14 DIAGNOSIS — M46.46 DISCITIS OF LUMBAR REGION: ICD-10-CM

## 2018-06-14 DIAGNOSIS — M46.46 LUMBAR DISCITIS: Primary | ICD-10-CM

## 2018-06-14 PROCEDURE — 99213 OFFICE O/P EST LOW 20 MIN: CPT | Performed by: INTERNAL MEDICINE

## 2018-06-20 ENCOUNTER — APPOINTMENT (OUTPATIENT)
Dept: PHARMACY | Age: 78
End: 2018-06-20
Payer: MEDICARE

## 2018-06-28 ENCOUNTER — HOSPITAL ENCOUNTER (OUTPATIENT)
Age: 78
Discharge: HOME | End: 2018-06-28
Payer: MEDICARE

## 2018-06-28 ENCOUNTER — HOSPITAL ENCOUNTER (OUTPATIENT)
Dept: PHARMACY | Age: 78
Setting detail: THERAPIES SERIES
Discharge: HOME | End: 2018-06-28
Payer: MEDICARE

## 2018-06-28 VITALS
HEART RATE: 84 BPM | SYSTOLIC BLOOD PRESSURE: 146 MMHG | BODY MASS INDEX: 35.79 KG/M2 | DIASTOLIC BLOOD PRESSURE: 90 MMHG | WEIGHT: 202 LBS

## 2018-06-28 DIAGNOSIS — R73.01 IMPAIRED FASTING GLUCOSE: ICD-10-CM

## 2018-06-28 DIAGNOSIS — M46.46 LUMBAR DISCITIS: ICD-10-CM

## 2018-06-28 DIAGNOSIS — E78.2 MIXED HYPERLIPIDEMIA: ICD-10-CM

## 2018-06-28 DIAGNOSIS — Z79.01 LONG TERM CURRENT USE OF ANTICOAGULANT THERAPY: ICD-10-CM

## 2018-06-28 LAB
ABSOLUTE EOS #: 0.26 K/UL (ref 0–0.44)
ABSOLUTE IMMATURE GRANULOCYTE: 0.05 K/UL (ref 0–0.3)
ABSOLUTE LYMPH #: 1.71 K/UL (ref 1.1–3.7)
ABSOLUTE MONO #: 0.55 K/UL (ref 0.1–1.2)
ALT SERPL-CCNC: 15 U/L (ref 5–33)
ANION GAP SERPL CALCULATED.3IONS-SCNC: 14 MMOL/L (ref 9–17)
AST SERPL-CCNC: 16 U/L
BASOPHILS # BLD: 1 % (ref 0–2)
BASOPHILS ABSOLUTE: 0.07 K/UL (ref 0–0.2)
BUN BLDV-MCNC: 23 MG/DL (ref 8–23)
BUN/CREAT BLD: 27 (ref 9–20)
C-REACTIVE PROTEIN: 2.6 MG/L (ref 0–5)
CALCIUM SERPL-MCNC: 9.1 MG/DL (ref 8.6–10.4)
CHLORIDE BLD-SCNC: 106 MMOL/L (ref 98–107)
CHOLESTEROL/HDL RATIO: 6
CHOLESTEROL: 342 MG/DL
CO2: 21 MMOL/L (ref 20–31)
CREAT SERPL-MCNC: 0.85 MG/DL (ref 0.5–0.9)
DIFFERENTIAL TYPE: ABNORMAL
EOSINOPHILS RELATIVE PERCENT: 4 % (ref 1–4)
ESTIMATED AVERAGE GLUCOSE: 120 MG/DL
GFR AFRICAN AMERICAN: >60 ML/MIN
GFR NON-AFRICAN AMERICAN: >60 ML/MIN
GFR SERPL CREATININE-BSD FRML MDRD: ABNORMAL ML/MIN/{1.73_M2}
GFR SERPL CREATININE-BSD FRML MDRD: ABNORMAL ML/MIN/{1.73_M2}
GLUCOSE BLD-MCNC: 136 MG/DL (ref 70–99)
HBA1C MFR BLD: 5.8 % (ref 4.8–5.9)
HCT VFR BLD CALC: 41.1 % (ref 36.3–47.1)
HDLC SERPL-MCNC: 57 MG/DL
HEMOGLOBIN: 13.2 G/DL (ref 11.9–15.1)
IMMATURE GRANULOCYTES: 1 %
INR BLD: 2.8
LDL CHOLESTEROL: 244 MG/DL (ref 0–130)
LYMPHOCYTES # BLD: 28 % (ref 24–43)
MCH RBC QN AUTO: 29.5 PG (ref 25.2–33.5)
MCHC RBC AUTO-ENTMCNC: 32.1 G/DL (ref 28.4–34.8)
MCV RBC AUTO: 91.7 FL (ref 82.6–102.9)
MONOCYTES # BLD: 9 % (ref 3–12)
NRBC AUTOMATED: 0 PER 100 WBC
PDW BLD-RTO: 13.4 % (ref 11.8–14.4)
PLATELET # BLD: 254 K/UL (ref 138–453)
PLATELET ESTIMATE: ABNORMAL
PMV BLD AUTO: 9.4 FL (ref 8.1–13.5)
POTASSIUM SERPL-SCNC: 4.3 MMOL/L (ref 3.7–5.3)
RBC # BLD: 4.48 M/UL (ref 3.95–5.11)
RBC # BLD: ABNORMAL 10*6/UL
SEDIMENTATION RATE, ERYTHROCYTE: 20 MM (ref 0–20)
SEG NEUTROPHILS: 57 % (ref 36–65)
SEGMENTED NEUTROPHILS ABSOLUTE COUNT: 3.52 K/UL (ref 1.5–8.1)
SODIUM BLD-SCNC: 141 MMOL/L (ref 135–144)
TRIGL SERPL-MCNC: 206 MG/DL
VLDLC SERPL CALC-MCNC: ABNORMAL MG/DL (ref 1–30)
WBC # BLD: 6.2 K/UL (ref 3.5–11.3)
WBC # BLD: ABNORMAL 10*3/UL

## 2018-06-28 PROCEDURE — 85025 COMPLETE CBC W/AUTO DIFF WBC: CPT

## 2018-06-28 PROCEDURE — 86140 C-REACTIVE PROTEIN: CPT

## 2018-06-28 PROCEDURE — 80048 BASIC METABOLIC PNL TOTAL CA: CPT

## 2018-06-28 PROCEDURE — 99211 OFF/OP EST MAY X REQ PHY/QHP: CPT

## 2018-06-28 PROCEDURE — 85610 PROTHROMBIN TIME: CPT

## 2018-06-28 PROCEDURE — 84460 ALANINE AMINO (ALT) (SGPT): CPT

## 2018-06-28 PROCEDURE — 36415 COLL VENOUS BLD VENIPUNCTURE: CPT

## 2018-06-28 PROCEDURE — 84450 TRANSFERASE (AST) (SGOT): CPT

## 2018-06-28 PROCEDURE — 83036 HEMOGLOBIN GLYCOSYLATED A1C: CPT

## 2018-06-28 PROCEDURE — 85651 RBC SED RATE NONAUTOMATED: CPT

## 2018-06-28 PROCEDURE — 80061 LIPID PANEL: CPT

## 2018-08-01 ENCOUNTER — HOSPITAL ENCOUNTER (OUTPATIENT)
Dept: PHARMACY | Age: 78
Setting detail: THERAPIES SERIES
Discharge: HOME | End: 2018-08-01
Payer: MEDICARE

## 2018-08-01 VITALS
DIASTOLIC BLOOD PRESSURE: 70 MMHG | BODY MASS INDEX: 36.32 KG/M2 | HEART RATE: 87 BPM | SYSTOLIC BLOOD PRESSURE: 150 MMHG | WEIGHT: 205 LBS

## 2018-08-01 DIAGNOSIS — Z79.01 LONG TERM CURRENT USE OF ANTICOAGULANT THERAPY: ICD-10-CM

## 2018-08-01 LAB — INR BLD: 2.4

## 2018-08-01 PROCEDURE — 99211 OFF/OP EST MAY X REQ PHY/QHP: CPT

## 2018-08-01 PROCEDURE — 85610 PROTHROMBIN TIME: CPT

## 2018-09-11 ENCOUNTER — HOSPITAL ENCOUNTER (OUTPATIENT)
Dept: PHARMACY | Age: 78
Setting detail: THERAPIES SERIES
Discharge: HOME | End: 2018-09-11
Payer: MEDICARE

## 2018-09-11 VITALS
DIASTOLIC BLOOD PRESSURE: 71 MMHG | BODY MASS INDEX: 36.32 KG/M2 | WEIGHT: 205 LBS | SYSTOLIC BLOOD PRESSURE: 151 MMHG | HEART RATE: 74 BPM

## 2018-09-11 DIAGNOSIS — Z79.01 LONG TERM CURRENT USE OF ANTICOAGULANT THERAPY: ICD-10-CM

## 2018-09-11 LAB — INR BLD: 6.3

## 2018-09-11 PROCEDURE — 85610 PROTHROMBIN TIME: CPT

## 2018-09-11 PROCEDURE — 99212 OFFICE O/P EST SF 10 MIN: CPT

## 2018-09-13 ENCOUNTER — HOSPITAL ENCOUNTER (OUTPATIENT)
Age: 78
Discharge: HOME | End: 2018-09-13
Payer: MEDICARE

## 2018-09-13 LAB
EKG ATRIAL RATE: 74 BPM
EKG P AXIS: 29 DEGREES
EKG P-R INTERVAL: 168 MS
EKG Q-T INTERVAL: 416 MS
EKG QRS DURATION: 76 MS
EKG QTC CALCULATION (BAZETT): 461 MS
EKG R AXIS: 5 DEGREES
EKG T AXIS: 24 DEGREES
EKG VENTRICULAR RATE: 74 BPM
MRSA, DNA, NASAL: ABNORMAL
SPECIMEN DESCRIPTION: ABNORMAL

## 2018-09-13 PROCEDURE — 93005 ELECTROCARDIOGRAM TRACING: CPT

## 2018-09-13 PROCEDURE — 87641 MR-STAPH DNA AMP PROBE: CPT

## 2018-09-13 PROCEDURE — 36415 COLL VENOUS BLD VENIPUNCTURE: CPT

## 2018-09-13 RX ORDER — UBIDECARENONE 75 MG
100 CAPSULE ORAL DAILY
COMMUNITY

## 2018-09-21 ENCOUNTER — ANESTHESIA EVENT (OUTPATIENT)
Dept: OPERATING ROOM | Age: 78
End: 2018-09-21
Payer: MEDICARE

## 2018-09-21 ENCOUNTER — HOSPITAL ENCOUNTER (OUTPATIENT)
Dept: PHARMACY | Age: 78
Setting detail: THERAPIES SERIES
Discharge: HOME | End: 2018-09-21
Payer: MEDICARE

## 2018-09-21 VITALS
DIASTOLIC BLOOD PRESSURE: 70 MMHG | BODY MASS INDEX: 36.49 KG/M2 | SYSTOLIC BLOOD PRESSURE: 134 MMHG | WEIGHT: 206 LBS | HEART RATE: 80 BPM

## 2018-09-21 DIAGNOSIS — Z79.01 LONG TERM CURRENT USE OF ANTICOAGULANT THERAPY: ICD-10-CM

## 2018-09-21 LAB — INR BLD: 4.3

## 2018-09-21 PROCEDURE — 85610 PROTHROMBIN TIME: CPT

## 2018-09-21 PROCEDURE — 99211 OFF/OP EST MAY X REQ PHY/QHP: CPT

## 2018-09-24 ENCOUNTER — ANESTHESIA (OUTPATIENT)
Dept: OPERATING ROOM | Age: 78
End: 2018-09-24
Payer: MEDICARE

## 2018-09-24 ENCOUNTER — HOSPITAL ENCOUNTER (OUTPATIENT)
Age: 78
Setting detail: OUTPATIENT SURGERY
Discharge: HOME | End: 2018-09-24
Attending: ORTHOPAEDIC SURGERY | Admitting: ORTHOPAEDIC SURGERY
Payer: MEDICARE

## 2018-09-24 ENCOUNTER — HOSPITAL ENCOUNTER (OUTPATIENT)
Dept: PHARMACY | Age: 78
Setting detail: THERAPIES SERIES
Discharge: HOME | End: 2018-09-24
Payer: MEDICARE

## 2018-09-24 VITALS
SYSTOLIC BLOOD PRESSURE: 137 MMHG | HEIGHT: 63 IN | OXYGEN SATURATION: 98 % | DIASTOLIC BLOOD PRESSURE: 59 MMHG | HEART RATE: 78 BPM | RESPIRATION RATE: 16 BRPM | WEIGHT: 206 LBS | TEMPERATURE: 97 F | BODY MASS INDEX: 36.5 KG/M2

## 2018-09-24 VITALS — OXYGEN SATURATION: 90 % | DIASTOLIC BLOOD PRESSURE: 64 MMHG | SYSTOLIC BLOOD PRESSURE: 93 MMHG

## 2018-09-24 VITALS — WEIGHT: 206 LBS | BODY MASS INDEX: 36.49 KG/M2

## 2018-09-24 DIAGNOSIS — Z98.890 S/P TRIGGER FINGER RELEASE: Primary | ICD-10-CM

## 2018-09-24 DIAGNOSIS — Z79.01 LONG TERM CURRENT USE OF ANTICOAGULANT THERAPY: ICD-10-CM

## 2018-09-24 LAB — INR BLD: 1.8

## 2018-09-24 PROCEDURE — 2709999900 HC NON-CHARGEABLE SUPPLY: Performed by: ORTHOPAEDIC SURGERY

## 2018-09-24 PROCEDURE — 3700000001 HC ADD 15 MINUTES (ANESTHESIA): Performed by: ORTHOPAEDIC SURGERY

## 2018-09-24 PROCEDURE — 3700000000 HC ANESTHESIA ATTENDED CARE: Performed by: ORTHOPAEDIC SURGERY

## 2018-09-24 PROCEDURE — 6360000002 HC RX W HCPCS: Performed by: ORTHOPAEDIC SURGERY

## 2018-09-24 PROCEDURE — 6360000002 HC RX W HCPCS

## 2018-09-24 PROCEDURE — 7100000010 HC PHASE II RECOVERY - FIRST 15 MIN: Performed by: ORTHOPAEDIC SURGERY

## 2018-09-24 PROCEDURE — 6360000002 HC RX W HCPCS: Performed by: NURSE ANESTHETIST, CERTIFIED REGISTERED

## 2018-09-24 PROCEDURE — 85610 PROTHROMBIN TIME: CPT

## 2018-09-24 PROCEDURE — 3600000003 HC SURGERY LEVEL 3 BASE: Performed by: ORTHOPAEDIC SURGERY

## 2018-09-24 PROCEDURE — 3600000013 HC SURGERY LEVEL 3 ADDTL 15MIN: Performed by: ORTHOPAEDIC SURGERY

## 2018-09-24 PROCEDURE — 7100000011 HC PHASE II RECOVERY - ADDTL 15 MIN: Performed by: ORTHOPAEDIC SURGERY

## 2018-09-24 PROCEDURE — 99211 OFF/OP EST MAY X REQ PHY/QHP: CPT

## 2018-09-24 PROCEDURE — 2580000003 HC RX 258: Performed by: ORTHOPAEDIC SURGERY

## 2018-09-24 RX ORDER — PROPOFOL 10 MG/ML
INJECTION, EMULSION INTRAVENOUS PRN
Status: DISCONTINUED | OUTPATIENT
Start: 2018-09-24 | End: 2018-09-24 | Stop reason: SDUPTHER

## 2018-09-24 RX ORDER — ROPIVACAINE HYDROCHLORIDE 5 MG/ML
INJECTION, SOLUTION EPIDURAL; INFILTRATION; PERINEURAL PRN
Status: DISCONTINUED | OUTPATIENT
Start: 2018-09-24 | End: 2018-09-24 | Stop reason: HOSPADM

## 2018-09-24 RX ORDER — FENTANYL CITRATE 50 UG/ML
50 INJECTION, SOLUTION INTRAMUSCULAR; INTRAVENOUS EVERY 5 MIN PRN
Status: DISCONTINUED | OUTPATIENT
Start: 2018-09-24 | End: 2018-09-24 | Stop reason: HOSPADM

## 2018-09-24 RX ORDER — PROPOFOL 10 MG/ML
INJECTION, EMULSION INTRAVENOUS CONTINUOUS PRN
Status: DISCONTINUED | OUTPATIENT
Start: 2018-09-24 | End: 2018-09-24 | Stop reason: SDUPTHER

## 2018-09-24 RX ORDER — FENTANYL CITRATE 50 UG/ML
INJECTION, SOLUTION INTRAMUSCULAR; INTRAVENOUS PRN
Status: DISCONTINUED | OUTPATIENT
Start: 2018-09-24 | End: 2018-09-24 | Stop reason: SDUPTHER

## 2018-09-24 RX ORDER — SODIUM CHLORIDE, SODIUM LACTATE, POTASSIUM CHLORIDE, CALCIUM CHLORIDE 600; 310; 30; 20 MG/100ML; MG/100ML; MG/100ML; MG/100ML
INJECTION, SOLUTION INTRAVENOUS CONTINUOUS
Status: DISCONTINUED | OUTPATIENT
Start: 2018-09-24 | End: 2018-09-24 | Stop reason: HOSPADM

## 2018-09-24 RX ORDER — DEXAMETHASONE SODIUM PHOSPHATE 4 MG/ML
INJECTION, SOLUTION INTRA-ARTICULAR; INTRALESIONAL; INTRAMUSCULAR; INTRAVENOUS; SOFT TISSUE PRN
Status: DISCONTINUED | OUTPATIENT
Start: 2018-09-24 | End: 2018-09-24 | Stop reason: SDUPTHER

## 2018-09-24 RX ORDER — ONDANSETRON 4 MG/1
4 TABLET, ORALLY DISINTEGRATING ORAL EVERY 30 MIN PRN
Status: DISCONTINUED | OUTPATIENT
Start: 2018-09-24 | End: 2018-09-24 | Stop reason: HOSPADM

## 2018-09-24 RX ORDER — KETOROLAC TROMETHAMINE 30 MG/ML
INJECTION, SOLUTION INTRAMUSCULAR; INTRAVENOUS PRN
Status: DISCONTINUED | OUTPATIENT
Start: 2018-09-24 | End: 2018-09-24 | Stop reason: SDUPTHER

## 2018-09-24 RX ORDER — ONDANSETRON 2 MG/ML
4 INJECTION INTRAMUSCULAR; INTRAVENOUS
Status: DISCONTINUED | OUTPATIENT
Start: 2018-09-24 | End: 2018-09-24 | Stop reason: HOSPADM

## 2018-09-24 RX ORDER — HYDROCODONE BITARTRATE AND ACETAMINOPHEN 5; 325 MG/1; MG/1
1 TABLET ORAL
Status: DISCONTINUED | OUTPATIENT
Start: 2018-09-24 | End: 2018-09-24 | Stop reason: HOSPADM

## 2018-09-24 RX ORDER — FENTANYL CITRATE 50 UG/ML
25 INJECTION, SOLUTION INTRAMUSCULAR; INTRAVENOUS EVERY 5 MIN PRN
Status: DISCONTINUED | OUTPATIENT
Start: 2018-09-24 | End: 2018-09-24 | Stop reason: HOSPADM

## 2018-09-24 RX ORDER — HYDROCODONE BITARTRATE AND ACETAMINOPHEN 5; 325 MG/1; MG/1
1-2 TABLET ORAL
Qty: 30 TABLET | Refills: 0 | Status: SHIPPED | OUTPATIENT
Start: 2018-09-24 | End: 2018-10-01

## 2018-09-24 RX ADMIN — KETOROLAC TROMETHAMINE 10 MG: 30 INJECTION, SOLUTION INTRAMUSCULAR; INTRAVENOUS at 10:23

## 2018-09-24 RX ADMIN — DEXAMETHASONE SODIUM PHOSPHATE 8 MG: 4 INJECTION, SOLUTION INTRAMUSCULAR; INTRAVENOUS at 10:23

## 2018-09-24 RX ADMIN — PROPOFOL 30 MG: 10 INJECTION, EMULSION INTRAVENOUS at 10:20

## 2018-09-24 RX ADMIN — FENTANYL CITRATE 100 MCG: 50 INJECTION INTRAMUSCULAR; INTRAVENOUS at 10:20

## 2018-09-24 RX ADMIN — PROPOFOL 75 MCG/KG/MIN: 10 INJECTION, EMULSION INTRAVENOUS at 10:20

## 2018-09-24 RX ADMIN — VANCOMYCIN HYDROCHLORIDE: 1 INJECTION, POWDER, LYOPHILIZED, FOR SOLUTION INTRAVENOUS at 10:10

## 2018-09-24 RX ADMIN — VANCOMYCIN HYDROCHLORIDE 1000 MG: 1 INJECTION, POWDER, LYOPHILIZED, FOR SOLUTION INTRAVENOUS at 09:35

## 2018-09-24 RX ADMIN — SODIUM CHLORIDE, POTASSIUM CHLORIDE, SODIUM LACTATE AND CALCIUM CHLORIDE: 600; 310; 30; 20 INJECTION, SOLUTION INTRAVENOUS at 09:04

## 2018-09-24 RX ADMIN — PROPOFOL 30 MG: 10 INJECTION, EMULSION INTRAVENOUS at 10:28

## 2018-09-24 ASSESSMENT — PULMONARY FUNCTION TESTS
PIF_VALUE: 0
PIF_VALUE: 1
PIF_VALUE: 0
PIF_VALUE: 1
PIF_VALUE: 0
PIF_VALUE: 1
PIF_VALUE: 0
PIF_VALUE: 1
PIF_VALUE: 1
PIF_VALUE: 0
PIF_VALUE: 1
PIF_VALUE: 0
PIF_VALUE: 1
PIF_VALUE: 1
PIF_VALUE: 0
PIF_VALUE: 0
PIF_VALUE: 1
PIF_VALUE: 1
PIF_VALUE: 0
PIF_VALUE: 1
PIF_VALUE: 0
PIF_VALUE: 1
PIF_VALUE: 0

## 2018-09-24 ASSESSMENT — PAIN - FUNCTIONAL ASSESSMENT: PAIN_FUNCTIONAL_ASSESSMENT: 0-10

## 2018-09-24 ASSESSMENT — PAIN SCALES - GENERAL
PAINLEVEL_OUTOF10: 0

## 2018-09-24 NOTE — PROGRESS NOTES
Fingerstick INR drawn per clinic protocol. Patient states no visible blood in urine and no black tarry stool. Denies any missed doses of warfarin. No change in other maintenance medications or in diet. Patient will stay at [facility] until the end of October. Patient is having surgery today -  Finger trigger release   Will continue current warfarin regimen and recheck INR in 4 week(s). Patient acknowledges working in consult agreement with pharmacist as referred by his/her physician.

## 2018-09-24 NOTE — ANESTHESIA PRE PROCEDURE
tobacco: Never Used    Alcohol use No                                Counseling given: Not Answered      Vital Signs (Current):   Vitals:    09/13/18 1027 09/24/18 0850   BP:  134/88   Pulse:  74   Resp:  18   Temp:  36.3 °C (97.3 °F)   TempSrc:  Temporal   SpO2:  95%   Weight: 205 lb (93 kg) 206 lb (93.4 kg)   Height: 5' 3\" (1.6 m) 5' 3\" (1.6 m)                                              BP Readings from Last 3 Encounters:   09/24/18 134/88   09/21/18 134/70   09/11/18 (!) 151/71       NPO Status: Time of last liquid consumption: 1900                        Time of last solid consumption: 1900                        Date of last liquid consumption: 09/23/18                        Date of last solid food consumption: 09/23/18    BMI:   Wt Readings from Last 3 Encounters:   09/24/18 206 lb (93.4 kg)   09/24/18 206 lb (93.4 kg)   09/21/18 206 lb (93.4 kg)     Body mass index is 36.49 kg/m². CBC:   Lab Results   Component Value Date    WBC 6.2 06/28/2018    RBC 4.48 06/28/2018    HGB 13.2 06/28/2018    HCT 41.1 06/28/2018    MCV 91.7 06/28/2018    RDW 13.4 06/28/2018     06/28/2018       CMP:   Lab Results   Component Value Date     06/28/2018    K 4.3 06/28/2018     06/28/2018    CO2 21 06/28/2018    BUN 23 06/28/2018    CREATININE 0.85 06/28/2018    GFRAA >60 06/28/2018    LABGLOM >60 06/28/2018    GLUCOSE 136 06/28/2018    GLUCOSE 110 05/22/2012    PROT 7.3 01/08/2018    CALCIUM 9.1 06/28/2018    BILITOT 0.15 01/08/2018    ALKPHOS 100 01/08/2018    AST 16 06/28/2018    ALT 15 06/28/2018       POC Tests: No results for input(s): POCGLU, POCNA, POCK, POCCL, POCBUN, POCHEMO, POCHCT in the last 72 hours.     Coags:   Lab Results   Component Value Date    PROTIME 52.6 11/13/2017    INR 1.8 09/24/2018       HCG (If Applicable): No results found for: PREGTESTUR, PREGSERUM, HCG, HCGQUANT     ABGs: No results found for: PHART, PO2ART, TNW7JNP, MUB5DUI, BEART, D0FACJWR     Type & Screen (If Applicable):  No results found for: LABABO, LABRH    Anesthesia Evaluation   no history of anesthetic complications:   Airway: Mallampati: II  TM distance: >3 FB   Neck ROM: full  Mouth opening: > = 3 FB Dental:    (+) caps      Pulmonary:normal exam        (-) recent URI                           Cardiovascular:  Exercise tolerance: good (>4 METS),            Beta Blocker:  Not on Beta Blocker         Neuro/Psych:   (+) psychiatric history:depression/anxiety             GI/Hepatic/Renal:   (+) GERD:,           Endo/Other:                     Abdominal:   (+) obese,         Vascular:                                        Anesthesia Plan      general     ASA 3       Induction: intravenous. MIPS: Postoperative opioids intended and Prophylactic antiemetics administered. Anesthetic plan and risks discussed with patient.                       Harley Faustin II, APRN - CRNA   9/24/2018

## 2018-09-25 NOTE — OP NOTE
individual wound was then closed with two horizontal mattress  sutures and one simple suture. The wounds were dressed with Xeroform  gauze, fluffs, sterile Webril, and an Ace wrap. The patient tolerated the  procedure well, was transferred to Recovery in a stable condition, will be  discharged home. Norco for discomfort, to be followed up in the office in  two weeks. LUCAS HECK    D: 09/24/2018 11:08:01       T: 09/24/2018 11:10:13     [email or web address]  Job#: [id number]     Doc#: [id number]    CC:  <>

## 2018-10-06 ENCOUNTER — HOSPITAL ENCOUNTER (EMERGENCY)
Age: 78
Discharge: HOME | End: 2018-10-06
Attending: EMERGENCY MEDICINE
Payer: MEDICARE

## 2018-10-06 VITALS
RESPIRATION RATE: 18 BRPM | HEIGHT: 63 IN | OXYGEN SATURATION: 97 % | BODY MASS INDEX: 35.44 KG/M2 | HEART RATE: 93 BPM | DIASTOLIC BLOOD PRESSURE: 79 MMHG | SYSTOLIC BLOOD PRESSURE: 153 MMHG | WEIGHT: 200 LBS | TEMPERATURE: 98.9 F

## 2018-10-06 DIAGNOSIS — S60.551A INFECTED FOREIGN BODY IN RIGHT HAND, INITIAL ENCOUNTER: Primary | ICD-10-CM

## 2018-10-06 DIAGNOSIS — L08.9 INFECTED FOREIGN BODY IN RIGHT HAND, INITIAL ENCOUNTER: Primary | ICD-10-CM

## 2018-10-06 PROCEDURE — 87070 CULTURE OTHR SPECIMN AEROBIC: CPT

## 2018-10-06 PROCEDURE — 87186 SC STD MICRODIL/AGAR DIL: CPT

## 2018-10-06 PROCEDURE — 6370000000 HC RX 637 (ALT 250 FOR IP): Performed by: EMERGENCY MEDICINE

## 2018-10-06 PROCEDURE — 87205 SMEAR GRAM STAIN: CPT

## 2018-10-06 PROCEDURE — 99283 EMERGENCY DEPT VISIT LOW MDM: CPT

## 2018-10-06 PROCEDURE — 86403 PARTICLE AGGLUT ANTBDY SCRN: CPT

## 2018-10-06 RX ORDER — CIPROFLOXACIN 500 MG/1
TABLET, FILM COATED ORAL
Status: DISCONTINUED
Start: 2018-10-06 | End: 2018-10-06 | Stop reason: HOSPADM

## 2018-10-06 RX ORDER — CIPROFLOXACIN 500 MG/1
500 TABLET, FILM COATED ORAL ONCE
Status: COMPLETED | OUTPATIENT
Start: 2018-10-06 | End: 2018-10-06

## 2018-10-06 RX ORDER — SULFAMETHOXAZOLE AND TRIMETHOPRIM 800; 160 MG/1; MG/1
1 TABLET ORAL ONCE
Status: COMPLETED | OUTPATIENT
Start: 2018-10-06 | End: 2018-10-06

## 2018-10-06 RX ORDER — SULFAMETHOXAZOLE AND TRIMETHOPRIM 800; 160 MG/1; MG/1
TABLET ORAL
Status: DISCONTINUED
Start: 2018-10-06 | End: 2018-10-06 | Stop reason: HOSPADM

## 2018-10-06 RX ORDER — CIPROFLOXACIN 500 MG/1
500 TABLET, FILM COATED ORAL 2 TIMES DAILY
Qty: 20 TABLET | Refills: 0 | Status: SHIPPED | OUTPATIENT
Start: 2018-10-06 | End: 2018-10-16

## 2018-10-06 RX ORDER — SULFAMETHOXAZOLE AND TRIMETHOPRIM 800; 160 MG/1; MG/1
1 TABLET ORAL 2 TIMES DAILY
Qty: 20 TABLET | Refills: 0 | Status: SHIPPED | OUTPATIENT
Start: 2018-10-06 | End: 2018-10-16

## 2018-10-06 RX ADMIN — CIPROFLOXACIN HYDROCHLORIDE 500 MG: 500 TABLET, FILM COATED ORAL at 17:37

## 2018-10-06 RX ADMIN — SULFAMETHOXAZOLE AND TRIMETHOPRIM 1 TABLET: 800; 160 TABLET ORAL at 17:37

## 2018-10-06 ASSESSMENT — PAIN SCALES - GENERAL: PAINLEVEL_OUTOF10: 4

## 2018-10-06 ASSESSMENT — ENCOUNTER SYMPTOMS: COLOR CHANGE: 1

## 2018-10-06 ASSESSMENT — PAIN DESCRIPTION - LOCATION: LOCATION: HAND

## 2018-10-06 ASSESSMENT — PAIN DESCRIPTION - ORIENTATION: ORIENTATION: RIGHT

## 2018-10-06 ASSESSMENT — PAIN DESCRIPTION - DESCRIPTORS: DESCRIPTORS: ACHING

## 2018-10-06 ASSESSMENT — PAIN DESCRIPTION - PAIN TYPE: TYPE: ACUTE PAIN

## 2018-10-08 LAB
CULTURE: ABNORMAL
DIRECT EXAM: ABNORMAL
DIRECT EXAM: ABNORMAL
Lab: ABNORMAL
ORGANISM: ABNORMAL
SPECIMEN DESCRIPTION: ABNORMAL
STATUS: ABNORMAL

## 2018-10-24 ENCOUNTER — HOSPITAL ENCOUNTER (OUTPATIENT)
Dept: PHARMACY | Age: 78
Setting detail: THERAPIES SERIES
Discharge: HOME | End: 2018-10-24
Payer: MEDICARE

## 2018-10-24 ENCOUNTER — HOSPITAL ENCOUNTER (OUTPATIENT)
Age: 78
Discharge: HOME | End: 2018-10-24
Payer: MEDICARE

## 2018-10-24 VITALS
SYSTOLIC BLOOD PRESSURE: 122 MMHG | HEART RATE: 92 BPM | BODY MASS INDEX: 36.49 KG/M2 | WEIGHT: 206 LBS | DIASTOLIC BLOOD PRESSURE: 63 MMHG

## 2018-10-24 DIAGNOSIS — Z79.01 LONG TERM CURRENT USE OF ANTICOAGULANT THERAPY: ICD-10-CM

## 2018-10-24 DIAGNOSIS — E78.2 MIXED HYPERLIPIDEMIA: ICD-10-CM

## 2018-10-24 DIAGNOSIS — R73.01 IMPAIRED FASTING GLUCOSE: ICD-10-CM

## 2018-10-24 LAB
ALT SERPL-CCNC: 13 U/L (ref 5–33)
ANION GAP SERPL CALCULATED.3IONS-SCNC: 11 MMOL/L (ref 9–17)
AST SERPL-CCNC: 13 U/L
BUN BLDV-MCNC: 18 MG/DL (ref 8–23)
BUN/CREAT BLD: 20 (ref 9–20)
CALCIUM SERPL-MCNC: 9.3 MG/DL (ref 8.6–10.4)
CHLORIDE BLD-SCNC: 103 MMOL/L (ref 98–107)
CHOLESTEROL/HDL RATIO: 4.8
CHOLESTEROL: 263 MG/DL
CO2: 24 MMOL/L (ref 20–31)
CREAT SERPL-MCNC: 0.89 MG/DL (ref 0.5–0.9)
ESTIMATED AVERAGE GLUCOSE: 123 MG/DL
GFR AFRICAN AMERICAN: >60 ML/MIN
GFR NON-AFRICAN AMERICAN: >60 ML/MIN
GFR SERPL CREATININE-BSD FRML MDRD: ABNORMAL ML/MIN/{1.73_M2}
GFR SERPL CREATININE-BSD FRML MDRD: ABNORMAL ML/MIN/{1.73_M2}
GLUCOSE BLD-MCNC: 106 MG/DL (ref 70–99)
HBA1C MFR BLD: 5.9 % (ref 4.8–5.9)
HDLC SERPL-MCNC: 55 MG/DL
INR BLD: 3.4
LDL CHOLESTEROL: 157 MG/DL (ref 0–130)
POTASSIUM SERPL-SCNC: 4.2 MMOL/L (ref 3.7–5.3)
SODIUM BLD-SCNC: 138 MMOL/L (ref 135–144)
TRIGL SERPL-MCNC: 255 MG/DL
VLDLC SERPL CALC-MCNC: ABNORMAL MG/DL (ref 1–30)

## 2018-10-24 PROCEDURE — 36415 COLL VENOUS BLD VENIPUNCTURE: CPT

## 2018-10-24 PROCEDURE — 84450 TRANSFERASE (AST) (SGOT): CPT

## 2018-10-24 PROCEDURE — 85610 PROTHROMBIN TIME: CPT

## 2018-10-24 PROCEDURE — 80048 BASIC METABOLIC PNL TOTAL CA: CPT

## 2018-10-24 PROCEDURE — 80061 LIPID PANEL: CPT

## 2018-10-24 PROCEDURE — 83036 HEMOGLOBIN GLYCOSYLATED A1C: CPT

## 2018-10-24 PROCEDURE — 84460 ALANINE AMINO (ALT) (SGPT): CPT

## 2018-10-24 PROCEDURE — 99212 OFFICE O/P EST SF 10 MIN: CPT

## 2018-10-24 NOTE — PATIENT INSTRUCTIONS
Continue to monitor urine and stool. Continue to monitor for signs of bleeding. Return to clinic in 3 weeks. Hold warfarin today.

## 2018-11-16 ENCOUNTER — HOSPITAL ENCOUNTER (OUTPATIENT)
Dept: PHARMACY | Age: 78
Setting detail: THERAPIES SERIES
Discharge: HOME | End: 2018-11-16
Payer: MEDICARE

## 2018-11-16 ENCOUNTER — HOSPITAL ENCOUNTER (OUTPATIENT)
Dept: WOMENS IMAGING | Age: 78
Discharge: HOME | End: 2018-11-18
Payer: MEDICARE

## 2018-11-16 VITALS
WEIGHT: 208 LBS | DIASTOLIC BLOOD PRESSURE: 80 MMHG | SYSTOLIC BLOOD PRESSURE: 143 MMHG | HEART RATE: 88 BPM | BODY MASS INDEX: 36.85 KG/M2

## 2018-11-16 DIAGNOSIS — Z12.39 BREAST CANCER SCREENING: ICD-10-CM

## 2018-11-16 DIAGNOSIS — Z79.01 LONG TERM CURRENT USE OF ANTICOAGULANT THERAPY: ICD-10-CM

## 2018-11-16 LAB — INR BLD: 2.2

## 2018-11-16 PROCEDURE — 99211 OFF/OP EST MAY X REQ PHY/QHP: CPT

## 2018-11-16 PROCEDURE — 85610 PROTHROMBIN TIME: CPT

## 2018-11-16 PROCEDURE — 77063 BREAST TOMOSYNTHESIS BI: CPT

## 2018-12-28 ENCOUNTER — HOSPITAL ENCOUNTER (OUTPATIENT)
Dept: PHARMACY | Age: 78
Setting detail: THERAPIES SERIES
Discharge: HOME | End: 2018-12-28
Payer: MEDICARE

## 2018-12-28 VITALS
HEART RATE: 81 BPM | WEIGHT: 213 LBS | DIASTOLIC BLOOD PRESSURE: 74 MMHG | BODY MASS INDEX: 37.73 KG/M2 | SYSTOLIC BLOOD PRESSURE: 143 MMHG

## 2018-12-28 DIAGNOSIS — Z79.01 LONG TERM CURRENT USE OF ANTICOAGULANT THERAPY: ICD-10-CM

## 2018-12-28 LAB — INR BLD: 3.7

## 2018-12-28 PROCEDURE — 85610 PROTHROMBIN TIME: CPT

## 2018-12-28 PROCEDURE — 99212 OFFICE O/P EST SF 10 MIN: CPT

## 2019-01-24 ENCOUNTER — HOSPITAL ENCOUNTER (OUTPATIENT)
Dept: PHARMACY | Age: 79
Setting detail: THERAPIES SERIES
Discharge: HOME | End: 2019-01-24
Payer: MEDICARE

## 2019-01-24 VITALS — BODY MASS INDEX: 37.09 KG/M2 | WEIGHT: 209.4 LBS

## 2019-01-24 DIAGNOSIS — Z79.01 LONG TERM CURRENT USE OF ANTICOAGULANT THERAPY: ICD-10-CM

## 2019-01-24 LAB — INR BLD: 2.3

## 2019-01-24 PROCEDURE — 85610 PROTHROMBIN TIME: CPT

## 2019-01-24 PROCEDURE — 99211 OFF/OP EST MAY X REQ PHY/QHP: CPT

## 2019-03-05 ENCOUNTER — HOSPITAL ENCOUNTER (OUTPATIENT)
Dept: PHARMACY | Age: 79
Setting detail: THERAPIES SERIES
Discharge: HOME | End: 2019-03-05
Payer: MEDICARE

## 2019-03-05 VITALS — BODY MASS INDEX: 36.67 KG/M2 | WEIGHT: 207 LBS

## 2019-03-05 DIAGNOSIS — Z79.01 LONG TERM CURRENT USE OF ANTICOAGULANT THERAPY: ICD-10-CM

## 2019-03-05 LAB — INR BLD: 1.7

## 2019-03-05 PROCEDURE — 99212 OFFICE O/P EST SF 10 MIN: CPT

## 2019-03-05 PROCEDURE — 99211 OFF/OP EST MAY X REQ PHY/QHP: CPT

## 2019-03-05 PROCEDURE — 85610 PROTHROMBIN TIME: CPT

## 2019-03-18 ENCOUNTER — HOSPITAL ENCOUNTER (OUTPATIENT)
Dept: PHARMACY | Age: 79
Setting detail: THERAPIES SERIES
Discharge: HOME | End: 2019-03-18
Payer: MEDICARE

## 2019-03-18 VITALS
BODY MASS INDEX: 36.85 KG/M2 | DIASTOLIC BLOOD PRESSURE: 79 MMHG | HEART RATE: 85 BPM | WEIGHT: 208 LBS | SYSTOLIC BLOOD PRESSURE: 150 MMHG

## 2019-03-18 DIAGNOSIS — Z79.01 LONG TERM CURRENT USE OF ANTICOAGULANT THERAPY: ICD-10-CM

## 2019-03-18 LAB — INR BLD: 3.7

## 2019-03-18 PROCEDURE — 85610 PROTHROMBIN TIME: CPT

## 2019-03-18 PROCEDURE — 99212 OFFICE O/P EST SF 10 MIN: CPT

## 2019-05-02 ENCOUNTER — HOSPITAL ENCOUNTER (OUTPATIENT)
Dept: PHARMACY | Age: 79
Setting detail: THERAPIES SERIES
Discharge: HOME | End: 2019-05-02
Payer: MEDICARE

## 2019-05-02 ENCOUNTER — HOSPITAL ENCOUNTER (OUTPATIENT)
Age: 79
Discharge: HOME | End: 2019-05-02
Payer: MEDICARE

## 2019-05-02 VITALS
WEIGHT: 206.8 LBS | HEART RATE: 98 BPM | BODY MASS INDEX: 36.63 KG/M2 | SYSTOLIC BLOOD PRESSURE: 146 MMHG | DIASTOLIC BLOOD PRESSURE: 81 MMHG

## 2019-05-02 DIAGNOSIS — R73.01 IMPAIRED FASTING GLUCOSE: ICD-10-CM

## 2019-05-02 DIAGNOSIS — Z79.01 LONG TERM CURRENT USE OF ANTICOAGULANT THERAPY: ICD-10-CM

## 2019-05-02 DIAGNOSIS — E78.2 MIXED HYPERLIPIDEMIA: ICD-10-CM

## 2019-05-02 LAB
ALT SERPL-CCNC: 18 U/L (ref 5–33)
ANION GAP SERPL CALCULATED.3IONS-SCNC: 14 MMOL/L (ref 9–17)
AST SERPL-CCNC: 12 U/L
BUN BLDV-MCNC: 23 MG/DL (ref 8–23)
BUN/CREAT BLD: 24 (ref 9–20)
CALCIUM SERPL-MCNC: 9.3 MG/DL (ref 8.6–10.4)
CHLORIDE BLD-SCNC: 103 MMOL/L (ref 98–107)
CHOLESTEROL/HDL RATIO: 3.1
CHOLESTEROL: 214 MG/DL
CO2: 24 MMOL/L (ref 20–31)
CREAT SERPL-MCNC: 0.97 MG/DL (ref 0.5–0.9)
ESTIMATED AVERAGE GLUCOSE: 137 MG/DL
GFR AFRICAN AMERICAN: >60 ML/MIN
GFR NON-AFRICAN AMERICAN: 56 ML/MIN
GFR SERPL CREATININE-BSD FRML MDRD: ABNORMAL ML/MIN/{1.73_M2}
GFR SERPL CREATININE-BSD FRML MDRD: ABNORMAL ML/MIN/{1.73_M2}
GLUCOSE BLD-MCNC: 131 MG/DL (ref 70–99)
HBA1C MFR BLD: 6.4 % (ref 4.8–5.9)
HCT VFR BLD CALC: 47 % (ref 36.3–47.1)
HDLC SERPL-MCNC: 70 MG/DL
HEMOGLOBIN: 14.5 G/DL (ref 11.9–15.1)
INR BLD: 2.7
LDL CHOLESTEROL: 116 MG/DL (ref 0–130)
MCH RBC QN AUTO: 28.9 PG (ref 25.2–33.5)
MCHC RBC AUTO-ENTMCNC: 30.9 G/DL (ref 28.4–34.8)
MCV RBC AUTO: 93.8 FL (ref 82.6–102.9)
NRBC AUTOMATED: 0 PER 100 WBC
PDW BLD-RTO: 13.7 % (ref 11.8–14.4)
PLATELET # BLD: 284 K/UL (ref 138–453)
PMV BLD AUTO: 9.4 FL (ref 8.1–13.5)
POTASSIUM SERPL-SCNC: 4.7 MMOL/L (ref 3.7–5.3)
RBC # BLD: 5.01 M/UL (ref 3.95–5.11)
SODIUM BLD-SCNC: 141 MMOL/L (ref 135–144)
TRIGL SERPL-MCNC: 141 MG/DL
VLDLC SERPL CALC-MCNC: ABNORMAL MG/DL (ref 1–30)
WBC # BLD: 14.1 K/UL (ref 3.5–11.3)

## 2019-05-02 PROCEDURE — 99211 OFF/OP EST MAY X REQ PHY/QHP: CPT | Performed by: FAMILY MEDICINE

## 2019-05-02 PROCEDURE — 85027 COMPLETE CBC AUTOMATED: CPT

## 2019-05-02 PROCEDURE — 83036 HEMOGLOBIN GLYCOSYLATED A1C: CPT

## 2019-05-02 PROCEDURE — 80061 LIPID PANEL: CPT

## 2019-05-02 PROCEDURE — 84450 TRANSFERASE (AST) (SGOT): CPT

## 2019-05-02 PROCEDURE — 84460 ALANINE AMINO (ALT) (SGPT): CPT

## 2019-05-02 PROCEDURE — 36415 COLL VENOUS BLD VENIPUNCTURE: CPT

## 2019-05-02 PROCEDURE — 85610 PROTHROMBIN TIME: CPT | Performed by: FAMILY MEDICINE

## 2019-05-02 PROCEDURE — 80048 BASIC METABOLIC PNL TOTAL CA: CPT

## 2019-05-02 NOTE — PROGRESS NOTES
Fingerstick INR drawn per clinic protocol. Patient states no visible blood in urine and no black tarry stool. Denies any missed doses of warfarin. No change in other maintenance medications or in diet. Will continue current warfarin regimen and recheck INR in 3 week(s). Chano Simmons returned from [facility] yesterday. She has been on Augmentin 875mg BID, prednisone taper, tessalon, and an inhaler for bronchitis for a week. Patient has missed multiple doses of warfarin but cannot tell me exactly when. Patient has 2 doses of prednisone (tonight and tomorrow) and about 5 days of Augmentin remaining. Patient will take 3.75mg warfarin on Sunday then return to previous dosing. Patient acknowledges working in consult agreement with pharmacist as referred by his/her physician.

## 2019-05-02 NOTE — PATIENT INSTRUCTIONS
Continue current dosing. Continue to monitor for signs of bleeding. Return to clinic in 3 weeks. Please take 1/2 tablet (3.75mg) warfarin on this Sunday then return to your regular dosing.

## 2019-05-22 ENCOUNTER — APPOINTMENT (OUTPATIENT)
Dept: PHARMACY | Age: 79
End: 2019-05-22
Payer: MEDICARE

## 2019-05-23 ENCOUNTER — HOSPITAL ENCOUNTER (OUTPATIENT)
Dept: PHARMACY | Age: 79
Setting detail: THERAPIES SERIES
Discharge: HOME | End: 2019-05-23
Payer: MEDICARE

## 2019-05-23 VITALS
WEIGHT: 208 LBS | SYSTOLIC BLOOD PRESSURE: 131 MMHG | HEART RATE: 77 BPM | DIASTOLIC BLOOD PRESSURE: 74 MMHG | BODY MASS INDEX: 36.85 KG/M2

## 2019-05-23 DIAGNOSIS — Z79.01 LONG TERM CURRENT USE OF ANTICOAGULANT THERAPY: ICD-10-CM

## 2019-05-23 LAB — INR BLD: 1.3

## 2019-05-23 PROCEDURE — 85610 PROTHROMBIN TIME: CPT

## 2019-05-23 PROCEDURE — 99212 OFFICE O/P EST SF 10 MIN: CPT

## 2019-05-23 NOTE — PROGRESS NOTES
Patient states no visible blood in urine and no black tarry stool. No change in other medications. Will return to clinic in 2 weeks. Patient states the going back and forth from \[facility]\"" confuses her with her medications. Patient will take warfarin 1.5 tablets (11.25 mg) today and tomorrow then resume previous stable dosage.

## 2019-05-23 NOTE — PATIENT INSTRUCTIONS
Continue to monitor urine and stool. Continue to monitor for signs of bleeding. Return to clinic in 2 weeks. Take warfarin 1.5 tablets (11.25 mg) today and tomorrow then resume previous stable dosage.

## 2019-06-13 ENCOUNTER — HOSPITAL ENCOUNTER (OUTPATIENT)
Dept: PHARMACY | Age: 79
Setting detail: THERAPIES SERIES
Discharge: HOME | End: 2019-06-13
Payer: MEDICARE

## 2019-06-13 VITALS
DIASTOLIC BLOOD PRESSURE: 67 MMHG | WEIGHT: 208 LBS | BODY MASS INDEX: 36.85 KG/M2 | SYSTOLIC BLOOD PRESSURE: 133 MMHG | HEART RATE: 106 BPM

## 2019-06-13 DIAGNOSIS — Z79.01 LONG TERM CURRENT USE OF ANTICOAGULANT THERAPY: ICD-10-CM

## 2019-06-13 LAB — INR BLD: 2.4

## 2019-06-13 PROCEDURE — 99211 OFF/OP EST MAY X REQ PHY/QHP: CPT

## 2019-06-13 PROCEDURE — 85610 PROTHROMBIN TIME: CPT

## 2019-06-13 NOTE — PROGRESS NOTES
Patient states no visible blood in urine and no black tarry stool. No change in other medications. Will return to clinic in 5 weeks.

## 2019-07-17 ENCOUNTER — HOSPITAL ENCOUNTER (OUTPATIENT)
Dept: PHARMACY | Age: 79
Setting detail: THERAPIES SERIES
Discharge: HOME | End: 2019-07-17
Payer: MEDICARE

## 2019-07-17 VITALS
BODY MASS INDEX: 37.16 KG/M2 | DIASTOLIC BLOOD PRESSURE: 83 MMHG | HEART RATE: 66 BPM | SYSTOLIC BLOOD PRESSURE: 122 MMHG | WEIGHT: 209.8 LBS

## 2019-07-17 DIAGNOSIS — Z79.01 LONG TERM CURRENT USE OF ANTICOAGULANT THERAPY: ICD-10-CM

## 2019-07-17 LAB — INR BLD: 1.8

## 2019-07-17 PROCEDURE — 85610 PROTHROMBIN TIME: CPT

## 2019-07-17 PROCEDURE — 99211 OFF/OP EST MAY X REQ PHY/QHP: CPT

## 2019-07-17 NOTE — PROGRESS NOTES
Fingerstick INR drawn per clinic protocol. Patient states no visible blood in urine and no black tarry stool. Denies any missed doses of warfarin. No change in other maintenance medications. Patient states she had cooked cabbage recently. Patient will take warfarin 7.5 mg today. Will continue current warfarin regimen and recheck INR in 8 week(s) per patient request as she is living at [address] [facility] this summer and will also be traveling to [place], therefore, will not be in [place]. Patient acknowledges working in consult agreement with pharmacist as referred by his/her physician.

## 2019-09-12 ENCOUNTER — HOSPITAL ENCOUNTER (OUTPATIENT)
Dept: PHARMACY | Age: 79
Setting detail: THERAPIES SERIES
Discharge: HOME | End: 2019-09-12
Payer: MEDICARE

## 2019-09-12 VITALS
DIASTOLIC BLOOD PRESSURE: 73 MMHG | WEIGHT: 206 LBS | BODY MASS INDEX: 36.49 KG/M2 | HEART RATE: 78 BPM | SYSTOLIC BLOOD PRESSURE: 138 MMHG

## 2019-09-12 DIAGNOSIS — Z79.01 LONG TERM CURRENT USE OF ANTICOAGULANT THERAPY: ICD-10-CM

## 2019-09-12 LAB — INR BLD: 1.8

## 2019-09-12 PROCEDURE — 85610 PROTHROMBIN TIME: CPT

## 2019-09-12 PROCEDURE — 99211 OFF/OP EST MAY X REQ PHY/QHP: CPT

## 2019-09-12 NOTE — PROGRESS NOTES
Patient states no visible blood in urine and no black tarry stool. No change in other medications. Will return to clinic in 6 weeks. Patient states she had cooked cabbage recently. Patient will take warfarin 11.25 mg today then resume previous dosage of half tablet (3.75 mg) on Wednesdays and whole tablet (7.5 mg) all other days.

## 2019-10-22 ENCOUNTER — HOSPITAL ENCOUNTER (OUTPATIENT)
Dept: PHARMACY | Age: 79
Setting detail: THERAPIES SERIES
Discharge: HOME | End: 2019-10-22
Payer: MEDICARE

## 2019-10-22 VITALS
DIASTOLIC BLOOD PRESSURE: 87 MMHG | WEIGHT: 210.8 LBS | SYSTOLIC BLOOD PRESSURE: 140 MMHG | HEART RATE: 86 BPM | BODY MASS INDEX: 37.34 KG/M2

## 2019-10-22 DIAGNOSIS — Z79.01 LONG TERM CURRENT USE OF ANTICOAGULANT THERAPY: ICD-10-CM

## 2019-10-22 LAB — INR BLD: 1.5

## 2019-10-22 PROCEDURE — 99212 OFFICE O/P EST SF 10 MIN: CPT | Performed by: FAMILY MEDICINE

## 2019-10-22 PROCEDURE — 85610 PROTHROMBIN TIME: CPT | Performed by: FAMILY MEDICINE

## 2019-11-09 ENCOUNTER — HOSPITAL ENCOUNTER (OUTPATIENT)
Age: 79
Discharge: HOME | End: 2019-11-09
Payer: MEDICARE

## 2019-11-09 DIAGNOSIS — R73.01 IMPAIRED FASTING GLUCOSE: ICD-10-CM

## 2019-11-09 DIAGNOSIS — E78.2 MIXED HYPERLIPIDEMIA: ICD-10-CM

## 2019-11-09 LAB
ALT SERPL-CCNC: 13 U/L (ref 5–33)
ANION GAP SERPL CALCULATED.3IONS-SCNC: 12 MMOL/L (ref 9–17)
AST SERPL-CCNC: 13 U/L
BUN BLDV-MCNC: 22 MG/DL (ref 8–23)
BUN/CREAT BLD: 26 (ref 9–20)
CALCIUM SERPL-MCNC: 9.2 MG/DL (ref 8.6–10.4)
CHLORIDE BLD-SCNC: 106 MMOL/L (ref 98–107)
CHOLESTEROL/HDL RATIO: 3.6
CHOLESTEROL: 182 MG/DL
CO2: 22 MMOL/L (ref 20–31)
CREAT SERPL-MCNC: 0.85 MG/DL (ref 0.5–0.9)
ESTIMATED AVERAGE GLUCOSE: 137 MG/DL
GFR AFRICAN AMERICAN: >60 ML/MIN
GFR NON-AFRICAN AMERICAN: >60 ML/MIN
GFR SERPL CREATININE-BSD FRML MDRD: ABNORMAL ML/MIN/{1.73_M2}
GFR SERPL CREATININE-BSD FRML MDRD: ABNORMAL ML/MIN/{1.73_M2}
GLUCOSE BLD-MCNC: 123 MG/DL (ref 70–99)
HBA1C MFR BLD: 6.4 % (ref 4.8–5.9)
HCT VFR BLD CALC: 42.8 % (ref 36.3–47.1)
HDLC SERPL-MCNC: 51 MG/DL
HEMOGLOBIN: 13.2 G/DL (ref 11.9–15.1)
LDL CHOLESTEROL: 93 MG/DL (ref 0–130)
MCH RBC QN AUTO: 29.1 PG (ref 25.2–33.5)
MCHC RBC AUTO-ENTMCNC: 30.8 G/DL (ref 28.4–34.8)
MCV RBC AUTO: 94.5 FL (ref 82.6–102.9)
NRBC AUTOMATED: 0 PER 100 WBC
PDW BLD-RTO: 13.6 % (ref 11.8–14.4)
PLATELET # BLD: 275 K/UL (ref 138–453)
PMV BLD AUTO: 9.4 FL (ref 8.1–13.5)
POTASSIUM SERPL-SCNC: 4.5 MMOL/L (ref 3.7–5.3)
RBC # BLD: 4.53 M/UL (ref 3.95–5.11)
SODIUM BLD-SCNC: 140 MMOL/L (ref 135–144)
TRIGL SERPL-MCNC: 190 MG/DL
VLDLC SERPL CALC-MCNC: ABNORMAL MG/DL (ref 1–30)
WBC # BLD: 7.2 K/UL (ref 3.5–11.3)

## 2019-11-09 PROCEDURE — 83036 HEMOGLOBIN GLYCOSYLATED A1C: CPT

## 2019-11-09 PROCEDURE — 80048 BASIC METABOLIC PNL TOTAL CA: CPT

## 2019-11-09 PROCEDURE — 80061 LIPID PANEL: CPT

## 2019-11-09 PROCEDURE — 85027 COMPLETE CBC AUTOMATED: CPT

## 2019-11-09 PROCEDURE — 84460 ALANINE AMINO (ALT) (SGPT): CPT

## 2019-11-09 PROCEDURE — 84450 TRANSFERASE (AST) (SGOT): CPT

## 2019-11-09 PROCEDURE — 36415 COLL VENOUS BLD VENIPUNCTURE: CPT

## 2019-11-14 ENCOUNTER — HOSPITAL ENCOUNTER (OUTPATIENT)
Age: 79
Setting detail: SPECIMEN
Discharge: HOME | End: 2019-11-14
Payer: MEDICARE

## 2019-11-14 ENCOUNTER — OFFICE VISIT (OUTPATIENT)
Dept: OBGYN | Age: 79
End: 2019-11-14
Payer: MEDICARE

## 2019-11-14 ENCOUNTER — HOSPITAL ENCOUNTER (OUTPATIENT)
Dept: PHARMACY | Age: 79
Setting detail: THERAPIES SERIES
Discharge: HOME | End: 2019-11-14
Payer: MEDICARE

## 2019-11-14 VITALS
DIASTOLIC BLOOD PRESSURE: 90 MMHG | BODY MASS INDEX: 39.04 KG/M2 | SYSTOLIC BLOOD PRESSURE: 128 MMHG | HEART RATE: 74 BPM | WEIGHT: 210 LBS

## 2019-11-14 VITALS
HEIGHT: 61 IN | BODY MASS INDEX: 39.65 KG/M2 | SYSTOLIC BLOOD PRESSURE: 134 MMHG | WEIGHT: 210 LBS | DIASTOLIC BLOOD PRESSURE: 74 MMHG

## 2019-11-14 DIAGNOSIS — Z01.419 WOMEN'S ANNUAL ROUTINE GYNECOLOGICAL EXAMINATION: ICD-10-CM

## 2019-11-14 DIAGNOSIS — Z79.01 LONG TERM CURRENT USE OF ANTICOAGULANT THERAPY: ICD-10-CM

## 2019-11-14 DIAGNOSIS — Z12.31 ENCOUNTER FOR SCREENING MAMMOGRAM FOR MALIGNANT NEOPLASM OF BREAST: Primary | ICD-10-CM

## 2019-11-14 LAB — INR BLD: 3.8

## 2019-11-14 PROCEDURE — G0101 CA SCREEN;PELVIC/BREAST EXAM: HCPCS | Performed by: OBSTETRICS & GYNECOLOGY

## 2019-11-14 PROCEDURE — 85610 PROTHROMBIN TIME: CPT

## 2019-11-14 PROCEDURE — G0145 SCR C/V CYTO,THINLAYER,RESCR: HCPCS

## 2019-11-14 PROCEDURE — 99212 OFFICE O/P EST SF 10 MIN: CPT

## 2019-11-21 LAB — CYTOLOGY REPORT: NORMAL

## 2019-11-22 ENCOUNTER — HOSPITAL ENCOUNTER (OUTPATIENT)
Dept: WOMENS IMAGING | Age: 79
Discharge: HOME | End: 2019-11-24
Payer: MEDICARE

## 2019-11-22 DIAGNOSIS — Z12.31 ENCOUNTER FOR SCREENING MAMMOGRAM FOR MALIGNANT NEOPLASM OF BREAST: ICD-10-CM

## 2019-11-22 PROCEDURE — 77063 BREAST TOMOSYNTHESIS BI: CPT

## 2019-12-11 ENCOUNTER — OFFICE VISIT (OUTPATIENT)
Dept: OBGYN | Age: 79
End: 2019-12-11
Payer: MEDICARE

## 2019-12-11 VITALS
DIASTOLIC BLOOD PRESSURE: 78 MMHG | SYSTOLIC BLOOD PRESSURE: 120 MMHG | WEIGHT: 211 LBS | BODY MASS INDEX: 39.84 KG/M2 | HEIGHT: 61 IN

## 2019-12-11 DIAGNOSIS — N90.89 VULVAR IRRITATION: ICD-10-CM

## 2019-12-11 DIAGNOSIS — N83.202 LEFT OVARIAN CYST: Primary | ICD-10-CM

## 2019-12-11 PROCEDURE — 99213 OFFICE O/P EST LOW 20 MIN: CPT | Performed by: OBSTETRICS & GYNECOLOGY

## 2019-12-11 RX ORDER — CLOTRIMAZOLE AND BETAMETHASONE DIPROPIONATE 10; .64 MG/G; MG/G
CREAM TOPICAL
Qty: 1 TUBE | Refills: 1 | Status: SHIPPED | OUTPATIENT
Start: 2019-12-11 | End: 2021-04-01 | Stop reason: ALTCHOICE

## 2019-12-16 ENCOUNTER — HOSPITAL ENCOUNTER (OUTPATIENT)
Dept: PHARMACY | Age: 79
Setting detail: THERAPIES SERIES
Discharge: HOME | End: 2019-12-16
Payer: MEDICARE

## 2019-12-16 VITALS
BODY MASS INDEX: 39.49 KG/M2 | HEART RATE: 83 BPM | SYSTOLIC BLOOD PRESSURE: 138 MMHG | DIASTOLIC BLOOD PRESSURE: 74 MMHG | WEIGHT: 209 LBS

## 2019-12-16 DIAGNOSIS — Z79.01 LONG TERM CURRENT USE OF ANTICOAGULANT THERAPY: ICD-10-CM

## 2019-12-16 LAB — INR BLD: 2.5

## 2019-12-16 PROCEDURE — 85610 PROTHROMBIN TIME: CPT | Performed by: FAMILY MEDICINE

## 2019-12-16 PROCEDURE — 99211 OFF/OP EST MAY X REQ PHY/QHP: CPT | Performed by: FAMILY MEDICINE

## 2020-01-21 ENCOUNTER — HOSPITAL ENCOUNTER (OUTPATIENT)
Dept: PHARMACY | Age: 80
Setting detail: THERAPIES SERIES
Discharge: HOME | End: 2020-01-21
Payer: MEDICARE

## 2020-01-21 VITALS
BODY MASS INDEX: 39.62 KG/M2 | HEART RATE: 73 BPM | DIASTOLIC BLOOD PRESSURE: 76 MMHG | WEIGHT: 209.7 LBS | SYSTOLIC BLOOD PRESSURE: 152 MMHG

## 2020-01-21 LAB — INR BLD: 1.3

## 2020-01-21 PROCEDURE — 99212 OFFICE O/P EST SF 10 MIN: CPT | Performed by: FAMILY MEDICINE

## 2020-01-21 PROCEDURE — 85610 PROTHROMBIN TIME: CPT | Performed by: FAMILY MEDICINE

## 2020-02-11 ENCOUNTER — HOSPITAL ENCOUNTER (OUTPATIENT)
Dept: PHARMACY | Age: 80
Setting detail: THERAPIES SERIES
Discharge: HOME | End: 2020-02-11
Payer: MEDICARE

## 2020-02-11 VITALS
BODY MASS INDEX: 39.98 KG/M2 | HEART RATE: 93 BPM | SYSTOLIC BLOOD PRESSURE: 144 MMHG | WEIGHT: 211.6 LBS | DIASTOLIC BLOOD PRESSURE: 56 MMHG

## 2020-02-11 LAB — INR BLD: 2.7

## 2020-02-11 PROCEDURE — 85610 PROTHROMBIN TIME: CPT | Performed by: FAMILY MEDICINE

## 2020-02-11 PROCEDURE — 99211 OFF/OP EST MAY X REQ PHY/QHP: CPT | Performed by: FAMILY MEDICINE

## 2020-02-11 NOTE — PROGRESS NOTES
Fingerstick INR drawn per clinic protocol. Patient states no visible blood in urine and no black tarry stool. Denies any missed doses of warfarin. No change in other maintenance medications or in diet. Will continue current warfarin regimen and recheck INR in 4 week(s). Will continue 3.75mg Wednesdays and 7.5mg all other days. Patient acknowledges working in consult agreement with pharmacist as referred by his/her physician.

## 2020-03-23 ENCOUNTER — TELEPHONE (OUTPATIENT)
Dept: PHARMACY | Age: 80
End: 2020-03-23

## 2020-03-24 ENCOUNTER — HOSPITAL ENCOUNTER (OUTPATIENT)
Dept: PHARMACY | Age: 80
Setting detail: THERAPIES SERIES
Discharge: HOME | End: 2020-03-24
Payer: MEDICARE

## 2020-03-24 LAB — INR BLD: 1.8

## 2020-03-24 PROCEDURE — 99211 OFF/OP EST MAY X REQ PHY/QHP: CPT

## 2020-03-24 PROCEDURE — 85610 PROTHROMBIN TIME: CPT

## 2020-03-24 NOTE — PROGRESS NOTES
Recent Travel Screening and Travel History documentation:     Travel Screening       Question Response     Do you have any of the following symptoms? None of these     In the last month, have you been in contact with someone who was confirmed or suspected to have Coronavirus / COVID-19? No / Unsure     Have you traveled internationally in the last month? No      Travel History   Travel since 20     No documented travel since 20         Fingerstick INR drawn per clinic protocol. Patient states no visible blood in urine and no black tarry stool. Denies any missed doses of warfarin. No change in other maintenance medications. BP and weight declined. INR is 1.8 today. Patient states she may have recently had some greens or cabbage. Patient will take warfarin 11.25 mg today. Will continue current warfarin regimen of 3.75 mg on Wednesday; 7.5 mg all other days and recheck INR in 8 week(s). Patient acknowledges working in consult agreement with pharmacist as referred by his/her physician. CLINICAL PHARMACY CONSULT: MED RECONCILIATION/REVIEW ADDENDUM    For Pharmacy Admin Tracking Only    PHSO: Yes  Total # of Interventions Recommended: 3  - Updated Order #: 3 Updated Order Reason(s):  Medication  - Maintenance Safety Lab Monitoring #: 1  Total Interventions Accepted: 4  Time Spent (min):  [address]  [place]isha Post, [place] [address]

## 2020-03-24 NOTE — PATIENT INSTRUCTIONS
Please take 1.5 warfarin tablets today (=11.25 mg). Continue current dose of warfarin as instructed on dosing calendar provided. Return to clinic in 8 weeks to recheck INR. Continue to monitor urine and stool for signs and symptoms of bleeding. Please notify the clinic of any medication changes. Please remember to bring all medications (both prescription and OTC) to your next visit. Kindly notify the clinic if you are unable to make to your next appointment.

## 2020-04-27 ENCOUNTER — HOSPITAL ENCOUNTER (OUTPATIENT)
Age: 80
Discharge: HOME | End: 2020-04-27
Payer: MEDICARE

## 2020-04-27 LAB
ALT SERPL-CCNC: 15 U/L (ref 5–33)
ANION GAP SERPL CALCULATED.3IONS-SCNC: 13 MMOL/L (ref 9–17)
AST SERPL-CCNC: 15 U/L
BUN BLDV-MCNC: 20 MG/DL (ref 8–23)
BUN/CREAT BLD: 20 (ref 9–20)
CALCIUM SERPL-MCNC: 9.5 MG/DL (ref 8.6–10.4)
CHLORIDE BLD-SCNC: 103 MMOL/L (ref 98–107)
CHOLESTEROL/HDL RATIO: 5.5
CHOLESTEROL: 310 MG/DL
CO2: 24 MMOL/L (ref 20–31)
CREAT SERPL-MCNC: 1 MG/DL (ref 0.5–0.9)
ESTIMATED AVERAGE GLUCOSE: 140 MG/DL
GFR AFRICAN AMERICAN: >60 ML/MIN
GFR NON-AFRICAN AMERICAN: 53 ML/MIN
GFR SERPL CREATININE-BSD FRML MDRD: ABNORMAL ML/MIN/{1.73_M2}
GFR SERPL CREATININE-BSD FRML MDRD: ABNORMAL ML/MIN/{1.73_M2}
GLUCOSE BLD-MCNC: 142 MG/DL (ref 70–99)
HBA1C MFR BLD: 6.5 % (ref 4.8–5.9)
HCT VFR BLD CALC: 44.1 % (ref 36.3–47.1)
HDLC SERPL-MCNC: 56 MG/DL
HEMOGLOBIN: 13.5 G/DL (ref 11.9–15.1)
LDL CHOLESTEROL: 199 MG/DL (ref 0–130)
MCH RBC QN AUTO: 28.4 PG (ref 25.2–33.5)
MCHC RBC AUTO-ENTMCNC: 30.6 G/DL (ref 28.4–34.8)
MCV RBC AUTO: 92.6 FL (ref 82.6–102.9)
NRBC AUTOMATED: 0 PER 100 WBC
PDW BLD-RTO: 14.5 % (ref 11.8–14.4)
PLATELET # BLD: 273 K/UL (ref 138–453)
PMV BLD AUTO: 9.5 FL (ref 8.1–13.5)
POTASSIUM SERPL-SCNC: 4.7 MMOL/L (ref 3.7–5.3)
RBC # BLD: 4.76 M/UL (ref 3.95–5.11)
SODIUM BLD-SCNC: 140 MMOL/L (ref 135–144)
TRIGL SERPL-MCNC: 276 MG/DL
VLDLC SERPL CALC-MCNC: ABNORMAL MG/DL (ref 1–30)
WBC # BLD: 7.8 K/UL (ref 3.5–11.3)

## 2020-04-27 PROCEDURE — 84460 ALANINE AMINO (ALT) (SGPT): CPT

## 2020-04-27 PROCEDURE — 83036 HEMOGLOBIN GLYCOSYLATED A1C: CPT

## 2020-04-27 PROCEDURE — 85027 COMPLETE CBC AUTOMATED: CPT

## 2020-04-27 PROCEDURE — 36415 COLL VENOUS BLD VENIPUNCTURE: CPT

## 2020-04-27 PROCEDURE — 80048 BASIC METABOLIC PNL TOTAL CA: CPT

## 2020-04-27 PROCEDURE — 80061 LIPID PANEL: CPT

## 2020-04-27 PROCEDURE — 84450 TRANSFERASE (AST) (SGOT): CPT

## 2020-04-27 NOTE — RESULT ENCOUNTER NOTE
Abnormal results noted. Patient needs scheduled a f/u appointment to discuss results if one is not already scheduled.   Her cholesterol levels are jeannette high

## 2020-05-15 ENCOUNTER — HOSPITAL ENCOUNTER (OUTPATIENT)
Dept: PHARMACY | Age: 80
Setting detail: THERAPIES SERIES
Discharge: HOME | End: 2020-05-15
Payer: MEDICARE

## 2020-05-15 VITALS — TEMPERATURE: 97.4 F

## 2020-05-15 LAB — INR BLD: 1.7

## 2020-05-15 PROCEDURE — 85610 PROTHROMBIN TIME: CPT

## 2020-05-15 PROCEDURE — 99212 OFFICE O/P EST SF 10 MIN: CPT

## 2020-05-19 ENCOUNTER — APPOINTMENT (OUTPATIENT)
Dept: PHARMACY | Age: 80
End: 2020-05-19
Payer: MEDICARE

## 2020-05-26 ENCOUNTER — TELEPHONE (OUTPATIENT)
Dept: PHARMACY | Age: 80
End: 2020-05-26

## 2020-05-26 NOTE — TELEPHONE ENCOUNTER
Patient returns call stating she has no coughfever/SOB. States she is still on [place] and will need to move appt to later in day or following day. Appt moved to Thursday at 1000. Patient states understanding for curbside visit.

## 2020-05-27 ENCOUNTER — APPOINTMENT (OUTPATIENT)
Dept: PHARMACY | Age: 80
End: 2020-05-27
Payer: MEDICARE

## 2020-05-28 ENCOUNTER — HOSPITAL ENCOUNTER (OUTPATIENT)
Dept: PHARMACY | Age: 80
Setting detail: THERAPIES SERIES
Discharge: HOME | End: 2020-05-28
Payer: MEDICARE

## 2020-05-28 VITALS — TEMPERATURE: 99 F

## 2020-05-28 LAB — INR BLD: 1.2

## 2020-05-28 PROCEDURE — 85610 PROTHROMBIN TIME: CPT

## 2020-05-28 PROCEDURE — 99212 OFFICE O/P EST SF 10 MIN: CPT

## 2020-05-28 NOTE — PROGRESS NOTES
Patient states no visible blood in urine and no black tarry stool. No change in other medications. Patient stopped her statin and started Zetia 2 weeks ago. Will return to clinic in 1.5 weeks. Patient will take warfarin 2 tablets for 15 mg today then increase dosage to 1.5 tablets for 11.25 mg Fridays and whole 7.5 mg tablet all other days. Saw patient GLORIA. CLINICAL PHARMACY CONSULT: MED RECONCILIATION/REVIEW ADDENDUM    For Pharmacy Admin Tracking Only    PHSO: Yes  Total # of Interventions Recommended: 2  - Increased Dose #: 1  - Maintenance Safety Lab Monitoring #: 1  Recommended intervention potential cost savings:   Accepted intervention potential cost savings:    Total Interventions Accepted: 2  Time Spent (min): 30    Vic MerrittD

## 2020-06-05 ENCOUNTER — TELEPHONE (OUTPATIENT)
Dept: PHARMACY | Age: 80
End: 2020-06-05

## 2020-06-08 ENCOUNTER — HOSPITAL ENCOUNTER (OUTPATIENT)
Dept: PHARMACY | Age: 80
Setting detail: THERAPIES SERIES
Discharge: HOME | End: 2020-06-08
Payer: MEDICARE

## 2020-06-08 VITALS — TEMPERATURE: 98.7 F

## 2020-06-08 LAB — INR BLD: 2.9

## 2020-06-08 PROCEDURE — 99211 OFF/OP EST MAY X REQ PHY/QHP: CPT

## 2020-06-08 PROCEDURE — 85610 PROTHROMBIN TIME: CPT

## 2020-06-17 ENCOUNTER — TELEPHONE (OUTPATIENT)
Dept: PHARMACY | Age: 80
End: 2020-06-17

## 2020-06-19 ENCOUNTER — HOSPITAL ENCOUNTER (OUTPATIENT)
Dept: PHARMACY | Age: 80
Setting detail: THERAPIES SERIES
Discharge: HOME | End: 2020-06-19
Payer: MEDICARE

## 2020-06-19 LAB — INR BLD: 2.5

## 2020-06-19 PROCEDURE — 85610 PROTHROMBIN TIME: CPT

## 2020-06-19 PROCEDURE — 99211 OFF/OP EST MAY X REQ PHY/QHP: CPT

## 2020-06-19 NOTE — PROGRESS NOTES
Patient states no visible blood in urine and no black tarry stool.  No change in other medications.    Will return to clinic in [place] will continue warfarin 1.5 tablets for 11.25 mg Fridays and whole 7.5 mg tablet all other days.  Saw patient GLORIA.     CLINICAL PHARMACY CONSULT: MED RECONCILIATION/REVIEW ADDENDUM    For Pharmacy Admin Tracking Only    PHSO: No  Total # of Interventions Recommended: 0    - Maintenance Safety Lab Monitoring #: 1  Recommended intervention potential cost savings:   Accepted intervention potential cost savings:    Total Interventions Accepted: 1  Time Spent (min): 30    Vic VázquezD

## 2020-06-19 NOTE — PATIENT INSTRUCTIONS
Continue to monitor urine and stool. Continue to monitor for signs of bleeding. Return to clinic in 4 weeks. Continue warfarin 1.5 tablets for 11.25 mg Fridays and whole 7.5 mg tablet all other days.

## 2020-07-14 ENCOUNTER — HOSPITAL ENCOUNTER (OUTPATIENT)
Dept: PHARMACY | Age: 80
Setting detail: THERAPIES SERIES
Discharge: HOME | End: 2020-07-14
Payer: MEDICARE

## 2020-07-14 VITALS — HEART RATE: 98 BPM

## 2020-07-14 LAB — INR BLD: 2.7

## 2020-07-14 PROCEDURE — 85610 PROTHROMBIN TIME: CPT | Performed by: FAMILY MEDICINE

## 2020-07-14 PROCEDURE — 99211 OFF/OP EST MAY X REQ PHY/QHP: CPT | Performed by: FAMILY MEDICINE

## 2020-07-14 NOTE — PROGRESS NOTES
Fingerstick INR drawn per clinic protocol. Patient states no visible blood in urine and no black tarry stool. Denies any missed doses of warfarin. No change in other maintenance medications or in diet. Will continue current warfarin regimen and recheck INR in 6 week(s). [place] Client states she had a small nose bleed this morning but was able to stop it quickly. She has been running A/C and feels this may be contributing to this. Patient states she had a COVID test today due to being exposed to 2 positive patients. Requested patient to contact clinic if any changes in medications. Patient acknowledges working in consult agreement with pharmacist as referred by his/her physician.  CURBSIDE VISIT      CLINICAL PHARMACY CONSULT: MED RECONCILIATION/REVIEW ADDENDUM    For Pharmacy Admin Tracking Only    PHSO: No  Total # of Interventions Recommended: 1    - Maintenance Safety Lab Monitoring #: 1    Total Interventions Accepted: 2  Time Spent (min): [address] [facility] 219

## 2020-08-08 ENCOUNTER — HOSPITAL ENCOUNTER (OUTPATIENT)
Age: 80
Discharge: HOME | End: 2020-08-08
Payer: MEDICARE

## 2020-08-08 LAB
ALT SERPL-CCNC: 16 U/L (ref 5–33)
ANION GAP SERPL CALCULATED.3IONS-SCNC: 12 MMOL/L (ref 9–17)
AST SERPL-CCNC: 17 U/L
BUN BLDV-MCNC: 17 MG/DL (ref 8–23)
BUN/CREAT BLD: 19 (ref 9–20)
CALCIUM SERPL-MCNC: 9.6 MG/DL (ref 8.6–10.4)
CHLORIDE BLD-SCNC: 108 MMOL/L (ref 98–107)
CHOLESTEROL/HDL RATIO: 4.7
CHOLESTEROL: 264 MG/DL
CO2: 22 MMOL/L (ref 20–31)
CREAT SERPL-MCNC: 0.88 MG/DL (ref 0.5–0.9)
ESTIMATED AVERAGE GLUCOSE: 134 MG/DL
GFR AFRICAN AMERICAN: >60 ML/MIN
GFR NON-AFRICAN AMERICAN: >60 ML/MIN
GFR SERPL CREATININE-BSD FRML MDRD: ABNORMAL ML/MIN/{1.73_M2}
GFR SERPL CREATININE-BSD FRML MDRD: ABNORMAL ML/MIN/{1.73_M2}
GLUCOSE BLD-MCNC: 142 MG/DL (ref 70–99)
HBA1C MFR BLD: 6.3 % (ref 4.8–5.9)
HDLC SERPL-MCNC: 56 MG/DL
LDL CHOLESTEROL: 149 MG/DL (ref 0–130)
POTASSIUM SERPL-SCNC: 4.4 MMOL/L (ref 3.7–5.3)
SODIUM BLD-SCNC: 142 MMOL/L (ref 135–144)
TRIGL SERPL-MCNC: 297 MG/DL
VLDLC SERPL CALC-MCNC: ABNORMAL MG/DL (ref 1–30)

## 2020-08-08 PROCEDURE — 84450 TRANSFERASE (AST) (SGOT): CPT

## 2020-08-08 PROCEDURE — 84460 ALANINE AMINO (ALT) (SGPT): CPT

## 2020-08-08 PROCEDURE — 80061 LIPID PANEL: CPT

## 2020-08-08 PROCEDURE — 83036 HEMOGLOBIN GLYCOSYLATED A1C: CPT

## 2020-08-08 PROCEDURE — 36415 COLL VENOUS BLD VENIPUNCTURE: CPT

## 2020-08-08 PROCEDURE — 80048 BASIC METABOLIC PNL TOTAL CA: CPT

## 2020-08-13 ENCOUNTER — HOSPITAL ENCOUNTER (OUTPATIENT)
Age: 80
Discharge: HOME | End: 2020-08-13
Payer: MEDICARE

## 2020-08-13 ENCOUNTER — OFFICE VISIT (OUTPATIENT)
Dept: OBGYN | Age: 80
End: 2020-08-13
Payer: MEDICARE

## 2020-08-13 VITALS
DIASTOLIC BLOOD PRESSURE: 70 MMHG | SYSTOLIC BLOOD PRESSURE: 116 MMHG | HEIGHT: 63 IN | WEIGHT: 208.2 LBS | BODY MASS INDEX: 36.89 KG/M2

## 2020-08-13 LAB
CA 125: 35 U/ML
CARCINOEMBRYONIC ANTIGEN: 4.1 NG/ML

## 2020-08-13 PROCEDURE — 36415 COLL VENOUS BLD VENIPUNCTURE: CPT

## 2020-08-13 PROCEDURE — 86304 IMMUNOASSAY TUMOR CA 125: CPT

## 2020-08-13 PROCEDURE — 82378 CARCINOEMBRYONIC ANTIGEN: CPT

## 2020-08-13 PROCEDURE — 99213 OFFICE O/P EST LOW 20 MIN: CPT | Performed by: OBSTETRICS & GYNECOLOGY

## 2020-08-13 NOTE — PROGRESS NOTES
PROBLEM VISIT     Date of service: 2020    Keily Wright  Is a 78 y.o.  female    PT's PCP is: Audi Ontiveros MD     : 1940                                             Subjective:       No LMP recorded. Patient is postmenopausal.     OB History    Para Term  AB Living   3 3 3         SAB TAB Ectopic Molar Multiple Live Births                    # Outcome Date GA Lbr Shukri/2nd Weight Sex Delivery Anes PTL Lv   3 Term 10/15/77    F Vag-Spont Spinal     2 Term 70    M Vag-Spont Spinal     1 Term 68    M Vag-Spont           Social History     Tobacco Use   Smoking Status Former Smoker    Packs/day: 1.00    Years: 17.00    Pack years: 17.00    Last attempt to quit: 1979    Years since quittin.6   Smokeless Tobacco Never Used        Social History     Substance and Sexual Activity   Alcohol Use No       Social History     Substance and Sexual Activity   Sexual Activity Not on file       Allergies: Latex and Percocet [oxycodone-acetaminophen]    Chief Complaint   Patient presents with    Ovarian Cyst     Left - US completed prior to appointment       Last Yearly:  2019    Last pap: 2019    Last HPV: never      NURSE: SONJA    PE:  Vital Signs  Blood pressure 116/70, height 5' 3\" (1.6 m), weight 208 lb 3.2 oz (94.4 kg), not currently breastfeeding. Labs:    No results found for this visit on 20. NURSE: sadia    HPI: Patient is here for a follow-up of the small left ovarian cyst noted coincidentally on prior exam.  The patient is not having any symptoms of this process    No  PT denies fever, chills, nausea and vomiting       Objective: The patient does have a clear cyst on the ovary about 2.5 cm. This is a change from about 1.8 cm in December of last year there is no complexity to the cyst.  No other abnormal findings on ultrasound. Assessment and Plan: I did discuss this with the patient.   She would be reluctant to have a surgical procedure unless there was a significant concern regarding malignancy. After discussion will order ovarian tumor markers and proceed with surgery if positive if negative sitter repeating the ultrasound in 6 months to a year          Diagnosis Orders   1. Left ovarian cyst      CEA   2. Other intra-abdominal and pelvic swelling, mass and lump      3. Encounter for follow-up examination after completed treatment for malignant neoplasm   CEA             No follow-ups on file. FF: 15 minutes    There are no Patient Instructions on file for this visit. Over 75%of time spent on counseling and care coordination on: see assessment and plan,  She was also counseled on her preventative health maintenance recommendations and follow-up.       Zhanna Del Valle,8/13/2020 10:50 AM

## 2020-08-27 ENCOUNTER — TELEPHONE (OUTPATIENT)
Dept: PHARMACY | Age: 80
End: 2020-08-27

## 2020-08-27 NOTE — TELEPHONE ENCOUNTER
Recent Travel Screening and Travel History documentation:     Travel Screening     Question   Response    In the last month, have you been in contact with someone who was confirmed or suspected to have Coronavirus / COVID-19? No / Unsure    Do you have any of the following symptoms? None of these    Have you traveled internationally in the last month? No      Travel History   Travel since 07/27/20     No documented travel since 07/27/20           Patient denies s/s covid/travel screen. Provided instruction for curbside INR check/appointment. Patient states understanding.

## 2020-08-28 ENCOUNTER — HOSPITAL ENCOUNTER (OUTPATIENT)
Dept: PHARMACY | Age: 80
Setting detail: THERAPIES SERIES
Discharge: HOME | End: 2020-08-28
Payer: MEDICARE

## 2020-08-28 VITALS — TEMPERATURE: 97.5 F

## 2020-08-28 LAB — INR BLD: 3

## 2020-08-28 PROCEDURE — 99211 OFF/OP EST MAY X REQ PHY/QHP: CPT

## 2020-08-28 PROCEDURE — 85610 PROTHROMBIN TIME: CPT

## 2020-08-28 RX ORDER — NYSTATIN 100000 [USP'U]/G
POWDER TOPICAL 3 TIMES DAILY PRN
COMMUNITY
End: 2021-10-21

## 2020-08-28 NOTE — PATIENT INSTRUCTIONS
Please take warfarin 7.5 mg today. Continue current dose of warfarin as instructed on dosing calendar provided - 11.25 mg every Friday and 7.5 mg all other days. Return to clinic in 6 weeks. Continue to monitor urine and stool for signs and symptoms of bleeding.

## 2020-08-28 NOTE — PROGRESS NOTES
Patient states no visible blood in urine and no black tarry stool. No change in other medications. Patient had a few alcoholic beverages this week while at her [address] Patient will take warfarin 7.5 mg today. Will return to clinic in 6 weeks. Patient will continue warfarin dosage of warfarin 11.25 mg every Friday; 7.5 mg all other days. Saw patient GLORIA. CLINICAL PHARMACY CONSULT: MED RECONCILIATION/REVIEW ADDENDUM    For Pharmacy Admin Tracking Only    PHSO: Yes  Total # of Interventions Recommended: 1  - Updated Order #: 1 Updated Order Reason(s): Other  - Maintenance Safety Lab Monitoring #: 1  Total Interventions Accepted: 2  Time Spent (min): [address], [facility] 219

## 2020-09-14 ENCOUNTER — OFFICE VISIT (OUTPATIENT)
Dept: OBGYN | Age: 80
End: 2020-09-14
Payer: MEDICARE

## 2020-09-14 VITALS
SYSTOLIC BLOOD PRESSURE: 124 MMHG | DIASTOLIC BLOOD PRESSURE: 70 MMHG | WEIGHT: 206 LBS | HEIGHT: 63 IN | BODY MASS INDEX: 36.5 KG/M2

## 2020-09-14 PROCEDURE — 99212 OFFICE O/P EST SF 10 MIN: CPT | Performed by: OBSTETRICS & GYNECOLOGY

## 2020-09-14 RX ORDER — METRONIDAZOLE 500 MG/1
500 TABLET ORAL 3 TIMES DAILY
Qty: 30 TABLET | Refills: 0 | Status: SHIPPED | OUTPATIENT
Start: 2020-09-14 | End: 2020-09-24

## 2020-09-14 NOTE — PROGRESS NOTES
Plan: I did tell the patient I do not believe this to be a malignancy. The patient did discussed reluctance to proceed with surgery unless absolutely necessary. She has numerous medical problems as well. For this reason, we are going to do short interval ultrasound follow-up in approximately 3 months. Diagnosis Orders   1. Postoperative wound infection  metroNIDAZOLE (FLAGYL) 500 MG tablet             No follow-ups on file. FF: 10 minutes    There are no Patient Instructions on file for this visit. Over 75%of time spent on counseling and care coordination on: see assessment and plan,  She was also counseled on her preventative health maintenance recommendations and follow-up.       Bart Muhammad 2:14 PM

## 2020-10-09 ENCOUNTER — TELEPHONE (OUTPATIENT)
Dept: PHARMACY | Age: 80
End: 2020-10-09

## 2020-10-09 NOTE — TELEPHONE ENCOUNTER
Recent Travel Screening and Travel History documentation:     Travel Screening     Question   Response    In the last month, have you been in contact with someone who was confirmed or suspected to have Coronavirus / COVID-19? Unable to assess    Have you had a COVID-19 viral test in the last 14 days? Unable to assess    Do you have any of the following new or worsening symptoms? Unable to assess    Have you traveled internationally in the last month? Unable to assess      Travel History   Travel since 09/09/20     No documented travel since 09/09/20         Left message for patient and provided instruction for curide INR check/ appointment. Instructed patient to notify clinic if fever or s/s respiratory illness.

## 2020-10-12 ENCOUNTER — HOSPITAL ENCOUNTER (OUTPATIENT)
Dept: PHARMACY | Age: 80
Setting detail: THERAPIES SERIES
Discharge: HOME | End: 2020-10-12
Payer: MEDICARE

## 2020-10-12 VITALS — TEMPERATURE: 97.5 F

## 2020-10-12 LAB — INR BLD: 3.5

## 2020-10-12 PROCEDURE — 99212 OFFICE O/P EST SF 10 MIN: CPT

## 2020-10-12 PROCEDURE — 85610 PROTHROMBIN TIME: CPT

## 2020-10-12 NOTE — PATIENT INSTRUCTIONS
Continue to monitor urine and stool. Continue to monitor for signs of bleeding. Return to clinic in 4 weeks.   Hold warfarin today then continue warfarin 1.5 tablets for 11.25 mg every Friday and whole 7.5 mg tablet all other days.

## 2020-10-12 NOTE — PROGRESS NOTES
[facility] [address] [facility]/[place]  Medication Management  ANTICOAGULATION    Referring Doctor: Barbara Hashimoto INR: 2-3    TODAY'S INR: 3.5    WARFARIN Dosage: Hold warfarin today then continue warfarin 1.5 tablets for 11.25 mg every Friday and whole 7.5 mg tablet all other days.     Saw patient CURBSIDE. INR (no units)   Date Value   10/12/2020 3.5   08/28/2020 3   07/14/2020 2.7   06/19/2020 2.5   06/08/2020 2.9   05/28/2020 1.2   05/15/2020 1.7       Hemoglobin (g/dL)   Date Value   04/27/2020 13.5   11/09/2019 13.2   05/02/2019 14.5     Hematocrit (%)   Date Value   04/27/2020 44.1   11/09/2019 42.8   05/02/2019 47.0     Platelets (k/uL)   Date Value   04/27/2020 273   11/09/2019 275   05/02/2019 284     Notes:    Fingerstick INR drawn per clinic protocol. Patient states no visible blood in urine and no black tarry stool. Denies any missed doses of warfarin. No change in other maintenance medications or in diet. Will recheck INR in 4 weeks. Patient acknowledges working in consult agreement with pharmacist as referred by his/her physician. Patient talked on the phone throughout entire appt. She said her medications are the same. CLINICAL PHARMACY CONSULT: MED RECONCILIATION/REVIEW ADDENDUM    For Pharmacy Admin Tracking Only    PHSO: Yes  Total # of Interventions Recommended: 1  - Decreased Dose #: 1  - Maintenance Safety Lab Monitoring #: 1  Recommended intervention potential cost savings:   Accepted intervention potential cost savings:    Total Interventions Accepted: 2  Time Spent (min): 30    Ildefonso Anne, VicD

## 2020-11-06 ENCOUNTER — TELEPHONE (OUTPATIENT)
Dept: PHARMACY | Age: 80
End: 2020-11-06

## 2020-11-06 NOTE — TELEPHONE ENCOUNTER
Recent Travel Screening and Travel History documentation:     Travel Screening     Question   Response    In the last month, have you been in contact with someone who was confirmed or suspected to have Coronavirus / COVID-19? Unable to assess    Have you had a COVID-19 viral test in the last 14 days? Unable to assess    Do you have any of the following new or worsening symptoms? Unable to assess    Have you traveled internationally in the last month? Unable to assess      Travel History   Travel since 10/06/20     No documented travel since 10/06/20         Left message for patient and provided instruction for crubside INR check/appiontment. Instructed patient to notify clinic  if fever or s/s respiratory illness. Told to come inside to clinic for apt.

## 2020-11-09 ENCOUNTER — HOSPITAL ENCOUNTER (OUTPATIENT)
Dept: PHARMACY | Age: 80
Setting detail: THERAPIES SERIES
Discharge: HOME | End: 2020-11-09
Payer: MEDICARE

## 2020-11-09 VITALS
WEIGHT: 209 LBS | DIASTOLIC BLOOD PRESSURE: 84 MMHG | BODY MASS INDEX: 37.02 KG/M2 | HEART RATE: 82 BPM | SYSTOLIC BLOOD PRESSURE: 141 MMHG

## 2020-11-09 LAB
INR BLD: 1.5
INR BLD: 1.6

## 2020-11-09 PROCEDURE — 99212 OFFICE O/P EST SF 10 MIN: CPT | Performed by: FAMILY MEDICINE

## 2020-11-09 PROCEDURE — 85610 PROTHROMBIN TIME: CPT | Performed by: FAMILY MEDICINE

## 2020-11-09 RX ORDER — ZINC GLUCONATE 50 MG
100 TABLET ORAL DAILY
COMMUNITY
End: 2021-02-25 | Stop reason: ALTCHOICE

## 2020-11-09 NOTE — PROGRESS NOTES
[address] [facility]/[place]  Medication Management  ANTICOAGULATION    Referring Doctor: Dr Skip Myers INR: 2.0-3.0    TODAY'S INR: 1.6    WARFARIN Dosage: 11.25mg today then 11.25mg Friday, 7.5mg all other day    Notes:    Fingerstick INR drawn per clinic protocol. Patient states no visible blood in urine and no black tarry stool. Denies any missed doses of warfarin. No change in other maintenance medications or in diet. Will recheck INR in 4 weeks. René Aranda states she has been using a pill minder tray but often forgets to take the medications. She is uncertain of how many doses and when she missed warfarin. Patient will take 112.5mg today then resume previously stable dosing. Patient acknowledges working in consult agreement with pharmacist as referred by his/her physician.                   CLINICAL PHARMACY CONSULT: MED RECONCILIATION/REVIEW ADDENDUM    For Pharmacy Admin Tracking Only    PHSO: Yes  Total # of Interventions Recommended: 1  - Increased Dose #: 1  - Maintenance Safety Lab Monitoring #: 1    Total Interventions Accepted: 2  Time Spent (min): 81 [facility]

## 2020-11-09 NOTE — PATIENT INSTRUCTIONS
Please take 11.25mg (1 1/2 tablets) today then return to your regular dosing. Continue to monitor for signs of bleeding. Return to coumadin clinic in 5 weeks.

## 2020-12-09 ENCOUNTER — HOSPITAL ENCOUNTER (OUTPATIENT)
Dept: WOMENS IMAGING | Age: 80
Discharge: HOME | End: 2020-12-11
Payer: MEDICARE

## 2020-12-09 PROCEDURE — 77063 BREAST TOMOSYNTHESIS BI: CPT

## 2020-12-15 ENCOUNTER — TELEPHONE (OUTPATIENT)
Dept: PHARMACY | Age: 80
End: 2020-12-15

## 2020-12-15 NOTE — TELEPHONE ENCOUNTER
COVID-19 phone screening     Call placed to screen patient prior to upcoming Medication Management visit for Anticoagulation on 12/16/20. Does patient have any of the following symptoms? [] Fever    [] Lower respiratory symptoms (SOB, difficulty breathing, cough)  [] None  Left message for patient    Travel Screening completed. Patient instructed to wear mask during visit.     Quynh Nuñez R.Ph., 12/15/2020,9:42 AM

## 2020-12-16 ENCOUNTER — HOSPITAL ENCOUNTER (OUTPATIENT)
Dept: PHARMACY | Age: 80
Setting detail: THERAPIES SERIES
Discharge: HOME | End: 2020-12-16
Payer: MEDICARE

## 2020-12-16 VITALS
TEMPERATURE: 97.2 F | SYSTOLIC BLOOD PRESSURE: 156 MMHG | WEIGHT: 212 LBS | HEART RATE: 93 BPM | BODY MASS INDEX: 37.55 KG/M2 | DIASTOLIC BLOOD PRESSURE: 78 MMHG

## 2020-12-16 LAB — INR BLD: 3.2

## 2020-12-16 PROCEDURE — 85610 PROTHROMBIN TIME: CPT

## 2020-12-16 PROCEDURE — 99211 OFF/OP EST MAY X REQ PHY/QHP: CPT

## 2020-12-16 NOTE — PROGRESS NOTES
[address] [place] [address]/[place]  Medication Management  ANTICOAGULATION    Referring Doctor: Dr. Dung Lackey INR: 2-3    TODAY'S INR: 3.2    WARFARIN Dosage: Patient will continue warfarin 11.25 mg every Friday and 7.5 mg all other days. Patient will hold warfarin dose today. INR (no units)   Date Value   12/16/2020 3.2   11/09/2020 1.6   11/09/2020 1.5   10/12/2020 3.5   08/28/2020 3   07/14/2020 2.7   06/19/2020 2.5       Notes:    Fingerstick INR drawn per clinic protocol. Patient states no visible blood in urine and no black tarry stool. Denies any missed doses of warfarin. No change in other maintenance medications or in diet. Will recheck INR in 4 weeks. Patient acknowledges working in consult agreement with pharmacist as referred by his/her physician. CLINICAL PHARMACY CONSULT: MED RECONCILIATION/REVIEW ADDENDUM    For Pharmacy Admin Tracking Only    PHSO: Yes  Total # of Interventions Recommended: 1  - Decreased Dose #: 1  - Maintenance Safety Lab Monitoring #: 1  Total Interventions Accepted: 2  Time Spent (min): 997 [facility] 20.  Stephanie Level

## 2021-01-05 ENCOUNTER — TELEPHONE (OUTPATIENT)
Dept: PHARMACY | Age: 81
End: 2021-01-05

## 2021-01-05 NOTE — TELEPHONE ENCOUNTER
Recent Travel Screening and Travel History documentation:     Travel Screening     Question   Response    In the last month, have you been in contact with someone who was confirmed or suspected to have Coronavirus / COVID-19? Unable to assess    Have you had a COVID-19 viral test in the last 14 days? Unable to assess    Do you have any of the following new or worsening symptoms? Unable to assess    Have you traveled internationally in the last month? Unable to assess      Travel History   Travel since 12/05/20     No documented travel since 12/05/20         [facility] if any fever or respiratory symptoms and informed that it will be an office visit inside.     William Valera, PharmD 1/5/2021 12:09 PM

## 2021-01-06 ENCOUNTER — OFFICE VISIT (OUTPATIENT)
Dept: OBGYN | Age: 81
End: 2021-01-06
Payer: MEDICARE

## 2021-01-06 ENCOUNTER — HOSPITAL ENCOUNTER (OUTPATIENT)
Dept: PHARMACY | Age: 81
Setting detail: THERAPIES SERIES
Discharge: HOME | End: 2021-01-06
Payer: MEDICARE

## 2021-01-06 VITALS
BODY MASS INDEX: 36.67 KG/M2 | WEIGHT: 214.8 LBS | SYSTOLIC BLOOD PRESSURE: 130 MMHG | DIASTOLIC BLOOD PRESSURE: 80 MMHG | HEIGHT: 64 IN

## 2021-01-06 VITALS
HEART RATE: 107 BPM | SYSTOLIC BLOOD PRESSURE: 143 MMHG | DIASTOLIC BLOOD PRESSURE: 72 MMHG | BODY MASS INDEX: 38.09 KG/M2 | WEIGHT: 215 LBS | TEMPERATURE: 97.1 F

## 2021-01-06 DIAGNOSIS — N83.202 LEFT OVARIAN CYST: Primary | ICD-10-CM

## 2021-01-06 DIAGNOSIS — Z79.01 LONG TERM CURRENT USE OF ANTICOAGULANT THERAPY: ICD-10-CM

## 2021-01-06 LAB — INR BLD: 3.4

## 2021-01-06 PROCEDURE — 99212 OFFICE O/P EST SF 10 MIN: CPT

## 2021-01-06 PROCEDURE — 99213 OFFICE O/P EST LOW 20 MIN: CPT | Performed by: OBSTETRICS & GYNECOLOGY

## 2021-01-06 PROCEDURE — 85610 PROTHROMBIN TIME: CPT

## 2021-01-06 ASSESSMENT — PATIENT HEALTH QUESTIONNAIRE - PHQ9
SUM OF ALL RESPONSES TO PHQ QUESTIONS 1-9: 2
1. LITTLE INTEREST OR PLEASURE IN DOING THINGS: 1
2. FEELING DOWN, DEPRESSED OR HOPELESS: 1

## 2021-01-06 NOTE — PROGRESS NOTES
[facility] [facility]/[place]  Medication Management  ANTICOAGULATION    Referring Doctor: Leslye Brown INR: 2-3    TODAY'S INR: 3.4    WARFARIN Dosage: Patient will hold warfarin today then decrease to whole 7.5 mg tablet daily. INR (no units)   Date Value   01/06/2021 3.4   12/16/2020 3.2   11/09/2020 1.6   11/09/2020 1.5   10/12/2020 3.5   08/28/2020 3   07/14/2020 2.7       Hemoglobin (g/dL)   Date Value   04/27/2020 13.5   11/09/2019 13.2   05/02/2019 14.5     Hematocrit (%)   Date Value   04/27/2020 44.1   11/09/2019 42.8   05/02/2019 47.0     Platelets (k/uL)   Date Value   04/27/2020 273   11/09/2019 275   05/02/2019 284     Notes:    Fingerstick INR drawn per clinic protocol. Patient states no visible blood in urine and no black tarry stool. Denies any missed doses of warfarin. No change in other maintenance medications or in diet. Will recheck INR in 4 weeks. Patient acknowledges working in consult agreement with pharmacist as referred by his/her physician. CLINICAL PHARMACY CONSULT: MED RECONCILIATION/REVIEW ADDENDUM    For Pharmacy Admin Tracking Only    PHSO: Yes  Total # of Interventions Recommended: 2  - Decreased Dose #: 2  - Maintenance Safety Lab Monitoring #: 1  Recommended intervention potential cost savings:   Accepted intervention potential cost savings:    Total Interventions Accepted: 3  Time Spent (min): 30    Mike Chase PharmD

## 2021-01-06 NOTE — PROGRESS NOTES
YEARLY PHYSICAL    Date of service: 2021    Coby Tejal  Is a [de-identified] y.o.   female    PT's PCP is: David Hernandez MD     : 1940                                             Subjective:       No LMP recorded.  Patient is postmenopausal.     Are your menses regular: not applicable    OB History    Para Term  AB Living   3 3 3         SAB TAB Ectopic Molar Multiple Live Births                    # Outcome Date GA Lbr Shukri/2nd Weight Sex Delivery Anes PTL Lv   3 Term 10/15/77    F Vag-Spont Spinal     2 Term 70    M Vag-Spont Spinal     1 Term 68    M Vag-Spont           Social History     Tobacco Use   Smoking Status Former Smoker    Packs/day: 1.00    Years: 17.00    Pack years: 17.00    Quit date: 1979    Years since quittin.0   Smokeless Tobacco Never Used        Social History     Substance and Sexual Activity   Alcohol Use No       Family History   Problem Relation Age of Onset    Colon Cancer Father 48    Other Father         gastric ulcers    Ovarian Cancer Mother 80    Colon Cancer Brother 79    Other Brother 54        viral endocarditis -  at 54 on heart transplant list    Deep Vein Thrombosis Brother         factor V Leiden - on lifelong warfarin    Other Brother 7        Mitochondrial myopathy and Chronic Progressive External Ophthalmoplegia dx'd at age 9 (he turned 79 in 2018)    Other Brother         AAA       Any family history of breast or ovarian cancer: yes mother ovarian cancer    Any family history of blood clots: Yes      Allergies: Latex and Percocet [oxycodone-acetaminophen]      Current Outpatient Medications:     zinc gluconate 50 MG tablet, Take 100 mg by mouth daily, Disp: , Rfl:     Cholecalciferol (VITAMIN D3) 1.25 MG (84853 UT) CAPS, Take 1 capsule by mouth daily , Disp: , Rfl:     nystatin (MYCOSTATIN) 823603 UNIT/GM powder, Apply topically 3 times daily as needed Apply topically TID until rash resolved. , Disp: , Rfl:     warfarin (COUMADIN) 7.5 MG tablet, Take 1 tablet by mouth daily (Patient taking differently: Take 7.5 mg by mouth daily Coumadin Clinic:  11.25 mg every Friday; 7.5 mg all other days), Disp: 96 tablet, Rfl: 3    sertraline (ZOLOFT) 50 MG tablet, Take 1 tablet by mouth daily Along with 100 mg for total daily dose 150 mg QD, Disp: 90 tablet, Rfl: 3    ezetimibe (ZETIA) 10 MG tablet, Take 1 tablet by mouth daily, Disp: 90 tablet, Rfl: 3    sertraline (ZOLOFT) 100 MG tablet, Take 1 tablet by mouth daily (take along with 50 mg tablet for total daily dose of 150 mg daily), Disp: 90 tablet, Rfl: 3    nystatin (MYCOSTATIN) 941310 UNIT/GM cream, Apply topically TID until rash resolved (Patient taking differently: Apply topically as needed Apply topically TID until rash resolved uses as needed), Disp: 30 g, Rfl: 1    clotrimazole-betamethasone (LOTRISONE) 1-0.05 % cream, Apply topically 2 times daily as needed for vulvar irritation, Disp: 1 Tube, Rfl: 1    NONFORMULARY, Take 1 drop by mouth daily Takes 1 dropperful sublingually daily  CBD oil, Disp: , Rfl:     vitamin B-12 (CYANOCOBALAMIN) 100 MCG tablet, Take 100 mcg by mouth daily , Disp: , Rfl:     acetaminophen (TYLENOL) 500 MG tablet, Take 500 mg by mouth every 4 hours as needed , Disp: , Rfl:     Coenzyme Q10 (COQ-10) 100 MG CAPS, Take 100 mg by mouth daily, Disp: , Rfl:     Social History     Substance and Sexual Activity   Sexual Activity Not on file       Any bleeding or pain with intercourse: NA    Last Yearly:  11/14/19    Last pap: 11/14/19    Last HPV: never    Last Mammogram: 12/9/2020    Last Dexascan never    Last colorectal screen- type: colonoscopy  date  2015    Do you do self breast exams: Yes    Past Medical History:   Diagnosis Date    Adenomatous colon polyp 7/2015    tubular adenoma    Anxiety     Depression     Diverticulitis 2009    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 Erica Maza - no polyps, pan-diverticulosis    COLONOSCOPY  2015    -polyp (tubular adenoma),diverticulosis,hemorrhoids    LUMBAR LAMINECTOMY  10/04/2017    [facility] - L2-3 and partial L4, due to diskitis / ostemyelitis of the lumbar spine    OVARIAN CYST REMOVAL      VA INCISE FINGER TENDON SHEATH Right 2018    Dr. Audrey Painting - trigger finger release right index, middle and ring fingers    TONSILLECTOMY  1950    TOTAL KNEE ARTHROPLASTY Right 2017    Dr. Booker Garcia in [address] [place] [address]  2016    Dr. Aranza Palafox - robot assisted laproscopic ventral hernia repair with mesh       Family History   Problem Relation Age of Onset    Colon Cancer Father 48    Other Father         gastric ulcers    Ovarian Cancer Mother 80    Colon Cancer Brother 79    Other Brother 54        viral endocarditis -  at 54 on heart transplant list    Deep Vein Thrombosis Brother         factor V Leiden - on lifelong warfarin    Other Brother 7        Mitochondrial myopathy and Chronic Progressive External Ophthalmoplegia dx'd at age 9 (he turned 79 in 2018)   [employer] Other Brother         AAA       Chief Complaint   Patient presents with    Follow-up     f/u gyn US           PE:  Vital Signs  Blood pressure 130/80, height 5' 3.5\" (1.613 m), weight 214 lb 12.8 oz (97.4 kg), not currently breastfeeding.   Estimated body mass index is 37.45 kg/m² as calculated from the following:    Height as of this encounter: 5' 3.5\" (1.613 m). Weight as of this encounter: 214 lb 12.8 oz (97.4 kg). Labs:    No results found for this visit on 01/06/21. NURSE: DEONTE    HPI: The patient is here today for a follow-up of a left ovarian cyst.  Patient is having no symptoms from this. Review of Systems      Objective: The cyst has increased slightly in size since her last ultrasound in August 2020. The cyst is completely clear in its appearance. There is no free fluid    Assessment/plan: Slightly increased size of left ovarian cyst.  Otherwise it is benign in its appearance. 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   I did share this with the patient and talked about further observation versus surgery at this point we will bring her back in in 3 months for further evaluation

## 2021-01-06 NOTE — PATIENT INSTRUCTIONS
Continue to monitor urine and stool. Continue to monitor for signs of bleeding. Return to clinic in 4 weeks. Hold warfarin today then decrease to whole 7.5 mg tablet daily.

## 2021-02-02 ENCOUNTER — TELEPHONE (OUTPATIENT)
Dept: PHARMACY | Age: 81
End: 2021-02-02

## 2021-02-02 NOTE — TELEPHONE ENCOUNTER

## 2021-02-03 ENCOUNTER — HOSPITAL ENCOUNTER (OUTPATIENT)
Dept: PHARMACY | Age: 81
Setting detail: THERAPIES SERIES
Discharge: HOME | End: 2021-02-03
Payer: MEDICARE

## 2021-02-03 VITALS
SYSTOLIC BLOOD PRESSURE: 146 MMHG | HEART RATE: 79 BPM | BODY MASS INDEX: 38.26 KG/M2 | DIASTOLIC BLOOD PRESSURE: 71 MMHG | WEIGHT: 219.4 LBS

## 2021-02-03 DIAGNOSIS — Z79.01 LONG TERM CURRENT USE OF ANTICOAGULANT THERAPY: ICD-10-CM

## 2021-02-03 LAB — INR BLD: 3.5

## 2021-02-03 PROCEDURE — 85610 PROTHROMBIN TIME: CPT

## 2021-02-03 PROCEDURE — 99211 OFF/OP EST MAY X REQ PHY/QHP: CPT

## 2021-02-03 NOTE — PATIENT INSTRUCTIONS
Please hold warfarin today. Continue current dose of warfarin as instructed on dosing calendar provided - 7.5 mg daily. Continue to monitor urine and stool for signs and symptoms of bleeding. Return to clinic in 3 weeks. Please notify the clinic of any medication changes. Please remember to bring all medications (both prescription and OTC) to your next visit. Kindly notify the clinic if you are unable to make to your next appointment.

## 2021-02-03 NOTE — PROGRESS NOTES
[facility] [facility]/[place]  Medication Management  ANTICOAGULATION    Referring Doctor: Norma Eller INR: 2-3    TODAY'S INR: 3.5    WARFARIN Dosage: Patient will continue warfarin 7.5 mg po daily. Patient will hold warfarin today,    INR (no units)   Date Value   02/03/2021 3.5   01/06/2021 3.4   12/16/2020 3.2   11/09/2020 1.6   11/09/2020 1.5   10/12/2020 3.5   08/28/2020 3       Notes:    Fingerstick INR drawn per clinic protocol. Patient states no visible blood in urine and no black tarry stool. Denies any missed doses of warfarin. No change in other maintenance medications or in diet. Patient thinks she took an extra warfarin dose last evening by mistake. Patient will hold warfarin today. Will recheck INR in 3 weeks. Patient acknowledges working in consult agreement with pharmacist as referred by his/her physician. CLINICAL PHARMACY CONSULT: MED RECONCILIATION/REVIEW ADDENDUM    For Pharmacy Admin Tracking Only    PHSO: Yes  Total # of Interventions Recommended: 1  - Decreased Dose #: 1  - Maintenance Safety Lab Monitoring #: 1  Total Interventions Accepted: 2  Time Spent (min): 997 [facility] 20.  Kim Valero

## 2021-02-10 ENCOUNTER — HOSPITAL ENCOUNTER (OUTPATIENT)
Age: 81
Discharge: HOME | End: 2021-02-10
Payer: MEDICARE

## 2021-02-10 DIAGNOSIS — E78.2 MIXED HYPERLIPIDEMIA: ICD-10-CM

## 2021-02-10 DIAGNOSIS — R73.01 IMPAIRED FASTING GLUCOSE: ICD-10-CM

## 2021-02-10 LAB
ALT SERPL-CCNC: 14 U/L (ref 5–33)
ANION GAP SERPL CALCULATED.3IONS-SCNC: 10 MMOL/L (ref 9–17)
AST SERPL-CCNC: 16 U/L
BUN BLDV-MCNC: 15 MG/DL (ref 8–23)
BUN/CREAT BLD: 18 (ref 9–20)
CALCIUM SERPL-MCNC: 9.7 MG/DL (ref 8.6–10.4)
CHLORIDE BLD-SCNC: 104 MMOL/L (ref 98–107)
CO2: 27 MMOL/L (ref 20–31)
CREAT SERPL-MCNC: 0.85 MG/DL (ref 0.5–0.9)
GFR AFRICAN AMERICAN: >60 ML/MIN
GFR NON-AFRICAN AMERICAN: >60 ML/MIN
GFR SERPL CREATININE-BSD FRML MDRD: NORMAL ML/MIN/{1.73_M2}
GFR SERPL CREATININE-BSD FRML MDRD: NORMAL ML/MIN/{1.73_M2}
GLUCOSE BLD-MCNC: 96 MG/DL (ref 70–99)
HCT VFR BLD CALC: 46.6 % (ref 36.3–47.1)
HEMOGLOBIN: 14.1 G/DL (ref 11.9–15.1)
MCH RBC QN AUTO: 28.4 PG (ref 25.2–33.5)
MCHC RBC AUTO-ENTMCNC: 30.3 G/DL (ref 28.4–34.8)
MCV RBC AUTO: 94 FL (ref 82.6–102.9)
NRBC AUTOMATED: 0 PER 100 WBC
PDW BLD-RTO: 14.2 % (ref 11.8–14.4)
PLATELET # BLD: 278 K/UL (ref 138–453)
PMV BLD AUTO: 9.1 FL (ref 8.1–13.5)
POTASSIUM SERPL-SCNC: 4.8 MMOL/L (ref 3.7–5.3)
RBC # BLD: 4.96 M/UL (ref 3.95–5.11)
SODIUM BLD-SCNC: 141 MMOL/L (ref 135–144)
WBC # BLD: 9.6 K/UL (ref 3.5–11.3)

## 2021-02-10 PROCEDURE — 36415 COLL VENOUS BLD VENIPUNCTURE: CPT

## 2021-02-10 PROCEDURE — 84460 ALANINE AMINO (ALT) (SGPT): CPT

## 2021-02-10 PROCEDURE — 85027 COMPLETE CBC AUTOMATED: CPT

## 2021-02-10 PROCEDURE — 80048 BASIC METABOLIC PNL TOTAL CA: CPT

## 2021-02-10 PROCEDURE — 84450 TRANSFERASE (AST) (SGOT): CPT

## 2021-02-10 PROCEDURE — 83036 HEMOGLOBIN GLYCOSYLATED A1C: CPT

## 2021-02-10 PROCEDURE — 80061 LIPID PANEL: CPT

## 2021-02-11 LAB
CHOLESTEROL/HDL RATIO: 4
CHOLESTEROL: 263 MG/DL
ESTIMATED AVERAGE GLUCOSE: 140 MG/DL
HBA1C MFR BLD: 6.5 % (ref 4–6)
HDLC SERPL-MCNC: 65 MG/DL
LDL CHOLESTEROL: 154 MG/DL (ref 0–130)
TRIGL SERPL-MCNC: 219 MG/DL
VLDLC SERPL CALC-MCNC: ABNORMAL MG/DL (ref 1–30)

## 2021-02-24 ENCOUNTER — TELEPHONE (OUTPATIENT)
Dept: PHARMACY | Age: 81
End: 2021-02-24

## 2021-02-24 NOTE — TELEPHONE ENCOUNTER
COVID-19 phone screening     Call placed to screen patient prior to upcoming Medication Management visit for Anticoagulation on 2/25/21. Does patient have any of the following symptoms? [] Fever    [] Lower respiratory symptoms (SOB, difficulty breathing, cough)  [x] None    Travel Screening completed.    Nick Chin R.Ph., 2/24/2021,1:52 PM

## 2021-02-25 ENCOUNTER — HOSPITAL ENCOUNTER (OUTPATIENT)
Dept: PHARMACY | Age: 81
Setting detail: THERAPIES SERIES
Discharge: HOME | End: 2021-02-25
Payer: MEDICARE

## 2021-02-25 VITALS
BODY MASS INDEX: 37.49 KG/M2 | WEIGHT: 215 LBS | DIASTOLIC BLOOD PRESSURE: 73 MMHG | TEMPERATURE: 97 F | SYSTOLIC BLOOD PRESSURE: 136 MMHG | HEART RATE: 85 BPM

## 2021-02-25 DIAGNOSIS — Z79.01 LONG TERM CURRENT USE OF ANTICOAGULANT THERAPY: ICD-10-CM

## 2021-02-25 LAB — INR BLD: 2.1

## 2021-02-25 PROCEDURE — 99211 OFF/OP EST MAY X REQ PHY/QHP: CPT

## 2021-02-25 PROCEDURE — 85610 PROTHROMBIN TIME: CPT

## 2021-02-25 NOTE — PROGRESS NOTES
[address] [place] [address]/[place]  Medication Management  ANTICOAGULATION    Referring Doctor: Mildred Penn INR: 2-3    TODAY'S INR: 2.1    WARFARIN Dosage: Patient will continue warfarin whole 7.5 mg tablet po daily. INR (no units)   Date Value   02/25/2021 2.1   02/03/2021 3.5   01/06/2021 3.4   12/16/2020 3.2   11/09/2020 1.6   11/09/2020 1.5   10/12/2020 3.5     Medication changes:  Stopped Zinc    Notes:    Fingerstick INR drawn per clinic protocol. Patient states no visible blood in urine and no black tarry stool. Denies any missed doses of warfarin. No change in other maintenance medications or in diet. Will recheck INR in 4 weeks. Patient acknowledges working in consult agreement with pharmacist as referred by his/her physician. CLINICAL PHARMACY CONSULT: MED RECONCILIATION/REVIEW ADDENDUM    For Pharmacy Admin Tracking Only    PHSO: Yes  Total # of Interventions Recommended: 1  - Discontinued Medication #: 1 Discontinue Reason(s): No Longer Used  - Maintenance Safety Lab Monitoring #: 1  Recommended intervention potential cost savings:   Accepted intervention potential cost savings:    Total Interventions Accepted: 2  Time Spent (min): 30    Brent Liu, VicD

## 2021-02-25 NOTE — PATIENT INSTRUCTIONS
Continue to monitor urine and stool. Continue to monitor for signs of bleeding. Return to clinic in 4 weeks. Continue warfarin whole 7.5 mg tablet po daily.

## 2021-04-01 ENCOUNTER — HOSPITAL ENCOUNTER (OUTPATIENT)
Dept: PHARMACY | Age: 81
Setting detail: THERAPIES SERIES
Discharge: HOME | End: 2021-04-01
Payer: MEDICARE

## 2021-04-01 VITALS
WEIGHT: 215 LBS | HEART RATE: 77 BPM | TEMPERATURE: 96.9 F | BODY MASS INDEX: 37.49 KG/M2 | DIASTOLIC BLOOD PRESSURE: 72 MMHG | SYSTOLIC BLOOD PRESSURE: 122 MMHG

## 2021-04-01 DIAGNOSIS — Z79.01 LONG TERM CURRENT USE OF ANTICOAGULANT THERAPY: ICD-10-CM

## 2021-04-01 LAB — INR BLD: 2

## 2021-04-01 PROCEDURE — 85610 PROTHROMBIN TIME: CPT

## 2021-04-01 PROCEDURE — 99211 OFF/OP EST MAY X REQ PHY/QHP: CPT

## 2021-04-15 ENCOUNTER — OFFICE VISIT (OUTPATIENT)
Dept: OBGYN | Age: 81
End: 2021-04-15
Payer: MEDICARE

## 2021-04-15 VITALS
HEIGHT: 64 IN | DIASTOLIC BLOOD PRESSURE: 70 MMHG | BODY MASS INDEX: 37.05 KG/M2 | SYSTOLIC BLOOD PRESSURE: 120 MMHG | WEIGHT: 217 LBS

## 2021-04-15 DIAGNOSIS — N83.202 LEFT OVARIAN CYST: Primary | ICD-10-CM

## 2021-04-15 PROCEDURE — 99212 OFFICE O/P EST SF 10 MIN: CPT | Performed by: OBSTETRICS & GYNECOLOGY

## 2021-04-15 NOTE — PROGRESS NOTES
PROBLEM VISIT     Date of service: 4/15/2021    Gaston Morrell  Is a [de-identified] y.o.  female    PT's PCP is: Trupti Prasad MD     : 1940                                             Subjective:       No LMP recorded. Patient is postmenopausal.     OB History    Para Term  AB Living   3 3 3         SAB TAB Ectopic Molar Multiple Live Births                    # Outcome Date GA Lbr Shukri/2nd Weight Sex Delivery Anes PTL Lv   3 Term 10/15/77    F Vag-Spont Spinal     2 Term 70    M Vag-Spont Spinal     1 Term 68    M Vag-Spont           Social History     Tobacco Use   Smoking Status Former Smoker    Packs/day: 1.00    Years: 17.00    Pack years: 17.00    Quit date: 1979    Years since quittin.3   Smokeless Tobacco Never Used        Social History     Substance and Sexual Activity   Alcohol Use No       Social History     Substance and Sexual Activity   Sexual Activity Not on file       Allergies: Latex and Percocet [oxycodone-acetaminophen]    Chief Complaint   Patient presents with    Results     pt presents for US results done on        Last Yearly:  19    Last pap: 19    Last HPV: never      NURSE: sukhdeep    PE:  Vital Signs  Blood pressure 120/70, height 5' 3.5\" (1.613 m), weight 217 lb (98.4 kg), not currently breastfeeding. Labs:    No results found for this visit on 04/15/21. NURSE: sadia    HPI: The patient is here for follow-up on ultrasound exam regarding an ovarian cyst.  She is having no symptoms of it at this time    No  PT denies fever, chills, nausea and vomiting       Objective: In review of the ultrasound there has been a slight enlargement from previous ultrasound in 2 years it is gone from about 2 cm to about 4 cm in size and is still clear with no free fluid or any other signs.                            Assessment and Plan: The patient states she would be very reluctant to have surgery unless there was a high suspicion of malignancy which I do not have. She would like further follow-up so will tentatively repeat this in 6 months timeframe          Diagnosis Orders   1. Left ovarian cyst               No follow-ups on file. FF: 10 minutes    There are no Patient Instructions on file for this visit. Over 75%of time spent on counseling and care coordination on: see assessment and plan,  She was also counseled on her preventative health maintenance recommendations and follow-up.       Leonardo Dover 12:05 PM

## 2021-05-11 ENCOUNTER — APPOINTMENT (OUTPATIENT)
Dept: PHARMACY | Age: 81
End: 2021-05-11
Payer: MEDICARE

## 2021-05-17 ENCOUNTER — TELEPHONE (OUTPATIENT)
Dept: PHARMACY | Age: 81
End: 2021-05-17

## 2021-05-18 ENCOUNTER — HOSPITAL ENCOUNTER (OUTPATIENT)
Dept: PHARMACY | Age: 81
Setting detail: THERAPIES SERIES
Discharge: HOME | End: 2021-05-18
Payer: MEDICARE

## 2021-05-18 VITALS
BODY MASS INDEX: 37.84 KG/M2 | DIASTOLIC BLOOD PRESSURE: 77 MMHG | HEART RATE: 79 BPM | SYSTOLIC BLOOD PRESSURE: 147 MMHG | WEIGHT: 217 LBS

## 2021-05-18 DIAGNOSIS — Z79.01 LONG TERM CURRENT USE OF ANTICOAGULANT THERAPY: Primary | ICD-10-CM

## 2021-05-18 LAB — INR BLD: 2.1

## 2021-05-18 PROCEDURE — 85610 PROTHROMBIN TIME: CPT

## 2021-05-18 PROCEDURE — 99211 OFF/OP EST MAY X REQ PHY/QHP: CPT

## 2021-05-18 RX ORDER — EZETIMIBE 10 MG/1
10 TABLET ORAL DAILY
COMMUNITY
End: 2021-10-06 | Stop reason: SDUPTHER

## 2021-05-18 NOTE — PROGRESS NOTES
[facility] [address] [place] [address]/[place]  Medication Management  ANTICOAGULATION    Referring Provider: Isai Irwin INR: 2-3    TODAY'S INR: 2.1    WARFARIN Dosage: 7.5 mg po daily    INR (no units)   Date Value   05/18/2021 2.1   04/01/2021 2   02/25/2021 2.1   02/03/2021 3.5   01/06/2021 3.4   12/16/2020 3.2   11/09/2020 1.6       Notes:    Fingerstick INR drawn per clinic protocol. Patient states no visible blood in urine and no black tarry stool. Denies any missed doses of warfarin. No change in other maintenance medications or in diet. Will recheck INR in 6 weeks. Patient acknowledges working in consult agreement with pharmacist as referred by his/her physician. CLINICAL PHARMACY CONSULT: MED RECONCILIATION/REVIEW ADDENDUM    For Pharmacy Admin Tracking Only     Total # of Interventions Recommended: 0   Total # of Interventions Accepted: 0   Time Spent (min): [place]. Letha Rubinstein

## 2021-06-12 ENCOUNTER — HOSPITAL ENCOUNTER (EMERGENCY)
Age: 81
Discharge: HOME | End: 2021-06-12
Attending: EMERGENCY MEDICINE
Payer: MEDICARE

## 2021-06-12 ENCOUNTER — APPOINTMENT (OUTPATIENT)
Dept: GENERAL RADIOLOGY | Age: 81
End: 2021-06-12
Payer: MEDICARE

## 2021-06-12 ENCOUNTER — APPOINTMENT (OUTPATIENT)
Dept: VASCULAR LAB | Age: 81
End: 2021-06-12
Payer: MEDICARE

## 2021-06-12 VITALS
WEIGHT: 215 LBS | OXYGEN SATURATION: 94 % | SYSTOLIC BLOOD PRESSURE: 166 MMHG | HEART RATE: 70 BPM | BODY MASS INDEX: 37.49 KG/M2 | RESPIRATION RATE: 18 BRPM | TEMPERATURE: 97.5 F | DIASTOLIC BLOOD PRESSURE: 54 MMHG

## 2021-06-12 DIAGNOSIS — M19.09 PRIMARY OSTEOARTHRITIS OF OTHER SITE: ICD-10-CM

## 2021-06-12 DIAGNOSIS — M71.22 SYNOVIAL CYST OF LEFT POPLITEAL SPACE: ICD-10-CM

## 2021-06-12 DIAGNOSIS — M25.569 KNEE PAIN, UNSPECIFIED CHRONICITY, UNSPECIFIED LATERALITY: Primary | ICD-10-CM

## 2021-06-12 LAB
ABSOLUTE EOS #: 0.25 K/UL (ref 0–0.44)
ABSOLUTE IMMATURE GRANULOCYTE: 0.07 K/UL (ref 0–0.3)
ABSOLUTE LYMPH #: 1.05 K/UL (ref 1.1–3.7)
ABSOLUTE MONO #: 0.55 K/UL (ref 0.1–1.2)
ANION GAP SERPL CALCULATED.3IONS-SCNC: 9 MMOL/L (ref 9–17)
BASOPHILS # BLD: 1 % (ref 0–2)
BASOPHILS ABSOLUTE: 0.05 K/UL (ref 0–0.2)
BUN BLDV-MCNC: 18 MG/DL (ref 8–23)
BUN/CREAT BLD: 22 (ref 9–20)
CALCIUM SERPL-MCNC: 9.2 MG/DL (ref 8.6–10.4)
CHLORIDE BLD-SCNC: 103 MMOL/L (ref 98–107)
CO2: 22 MMOL/L (ref 20–31)
CREAT SERPL-MCNC: 0.83 MG/DL (ref 0.5–0.9)
DIFFERENTIAL TYPE: ABNORMAL
EOSINOPHILS RELATIVE PERCENT: 4 % (ref 1–4)
GFR AFRICAN AMERICAN: >60 ML/MIN
GFR NON-AFRICAN AMERICAN: >60 ML/MIN
GFR SERPL CREATININE-BSD FRML MDRD: ABNORMAL ML/MIN/{1.73_M2}
GFR SERPL CREATININE-BSD FRML MDRD: ABNORMAL ML/MIN/{1.73_M2}
GLUCOSE BLD-MCNC: 133 MG/DL (ref 70–99)
HCT VFR BLD CALC: 42.8 % (ref 36.3–47.1)
HEMOGLOBIN: 13.5 G/DL (ref 11.9–15.1)
IMMATURE GRANULOCYTES: 1 %
INR BLD: 2
LYMPHOCYTES # BLD: 15 % (ref 24–43)
MCH RBC QN AUTO: 28.8 PG (ref 25.2–33.5)
MCHC RBC AUTO-ENTMCNC: 31.5 G/DL (ref 28.4–34.8)
MCV RBC AUTO: 91.5 FL (ref 82.6–102.9)
MONOCYTES # BLD: 8 % (ref 3–12)
NRBC AUTOMATED: 0 PER 100 WBC
PDW BLD-RTO: 14 % (ref 11.8–14.4)
PLATELET # BLD: 231 K/UL (ref 138–453)
PLATELET ESTIMATE: ABNORMAL
PMV BLD AUTO: 9.3 FL (ref 8.1–13.5)
POTASSIUM SERPL-SCNC: 4.3 MMOL/L (ref 3.7–5.3)
PROTHROMBIN TIME: 22.2 SEC (ref 11.5–14.2)
RBC # BLD: 4.68 M/UL (ref 3.95–5.11)
RBC # BLD: ABNORMAL 10*6/UL
SEG NEUTROPHILS: 71 % (ref 36–65)
SEGMENTED NEUTROPHILS ABSOLUTE COUNT: 4.99 K/UL (ref 1.5–8.1)
SODIUM BLD-SCNC: 134 MMOL/L (ref 135–144)
WBC # BLD: 7 K/UL (ref 3.5–11.3)
WBC # BLD: ABNORMAL 10*3/UL

## 2021-06-12 PROCEDURE — 93971 EXTREMITY STUDY: CPT

## 2021-06-12 PROCEDURE — 85610 PROTHROMBIN TIME: CPT

## 2021-06-12 PROCEDURE — 73562 X-RAY EXAM OF KNEE 3: CPT

## 2021-06-12 PROCEDURE — 85025 COMPLETE CBC W/AUTO DIFF WBC: CPT

## 2021-06-12 PROCEDURE — 36415 COLL VENOUS BLD VENIPUNCTURE: CPT

## 2021-06-12 PROCEDURE — 99283 EMERGENCY DEPT VISIT LOW MDM: CPT

## 2021-06-12 PROCEDURE — 80048 BASIC METABOLIC PNL TOTAL CA: CPT

## 2021-06-12 ASSESSMENT — PAIN DESCRIPTION - ORIENTATION: ORIENTATION: LEFT

## 2021-06-12 ASSESSMENT — PAIN DESCRIPTION - DESCRIPTORS: DESCRIPTORS: ACHING;TENDER;OTHER (COMMENT)

## 2021-06-12 ASSESSMENT — ENCOUNTER SYMPTOMS
GASTROINTESTINAL NEGATIVE: 1
RESPIRATORY NEGATIVE: 1
ALLERGIC/IMMUNOLOGIC NEGATIVE: 1

## 2021-06-12 ASSESSMENT — PAIN DESCRIPTION - LOCATION: LOCATION: KNEE

## 2021-06-12 ASSESSMENT — PAIN SCALES - GENERAL: PAINLEVEL_OUTOF10: 1

## 2021-06-12 ASSESSMENT — PAIN DESCRIPTION - FREQUENCY: FREQUENCY: INTERMITTENT

## 2021-06-12 ASSESSMENT — PAIN DESCRIPTION - PAIN TYPE: TYPE: ACUTE PAIN;OTHER (COMMENT)

## 2021-06-12 NOTE — ED PROVIDER NOTES
[facility] ED  EMERGENCY DEPARTMENT ENCOUNTER      Pt Name: Gaston Morrell  MRN: [id number]  [place] 1940  Date of evaluation: 6/12/2021  Provider: Doris Albert MD    CHIEF COMPLAINT       Chief Complaint   Patient presents with    Knee Pain     left knee pain, woke up yesterday with knee pain but has had grinding pain          HISTORY OF PRESENT ILLNESS   (Location/Symptom, Timing/Onset, Context/Setting, Quality, Duration, Modifying Factors, Severity)  Note limiting factors. Gaston Morrell is a [de-identified] y.o. female who presents to the emergency department presents to the emergency department with history for gradual onset of discomfort primary about her left knee. 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 She denies any history for direct trauma. She admits no fever or chilling. She absolutely denies any history for deep venous cirrhosis or pulmonary emboli  Discomfort is primarily increased when attempting to walk on her left leg. She denies any history for hip discomfort    Nursing Notes were reviewed. REVIEW OF SYSTEMS    (2-9 systems for level 4, 10 or more for level 5)     Review of Systems   Constitutional: Negative for chills, diaphoresis and fever. Respiratory: Negative. Cardiovascular: Negative. Gastrointestinal: Negative. Musculoskeletal:        Denies left hip pain,    Admits to left knee pain-denies left thigh, left calf discomfort, denies back discomfort   Skin: Negative for rash. Allergic/Immunologic: Negative. Neurological: Negative. Except as noted above the remainder of the review of systems was reviewed and negative.        PAST MEDICAL HISTORY     Past Medical History:   Diagnosis Date    Adenomatous colon polyp 7/2015    tubular adenoma    Anxiety     Depression     Diverticulitis 2009    Epidural abscess 09/2017    with diskitis / osteomyelitis s/p IV Abx course and L2-3 laminectomy    [facility]     GERD (gastroesophageal reflux disease)     History of blood transfusion     no reaction    History of DVT (deep vein thrombosis)     DVT right leg    Hyperlipidemia     Impaired fasting glucose     Kidney stones     MRSA bacteremia 09/23/2017         SURGICAL HISTORY       Past Surgical History:   Procedure Laterality Date    APPENDECTOMY  1978    CATARACT REMOVAL Bilateral 2015    Dr. Fadi Garcia - no polyps, pan-diverticulosis    COLONOSCOPY  7/23/2015    -polyp (tubular adenoma),diverticulosis,hemorrhoids    LUMBAR LAMINECTOMY  10/04/2017    [facility] - L2-3 and partial L4, due to diskitis / ostemyelitis of the lumbar spine    OVARIAN CYST REMOVAL  1976    MD INCISE FINGER TENDON SHEATH Right 9/24/2018    Dr. Renu Solis - trigger finger release right index, middle and ring fingers    TONSILLECTOMY  1950    TOTAL KNEE ARTHROPLASTY Right 08/02/2017    Dr. Kym Son in [address] [place] [address]  04/22/2016    Dr. Kim Bustamante - robot assisted laproscopic ventral hernia repair with mesh         CURRENT MEDICATIONS       Discharge Medication List as of 6/12/2021 10:55 AM      CONTINUE these medications which have NOT CHANGED    Details   ezetimibe (ZETIA) 10 MG tablet Take 10 mg by mouth dailyHistorical Med      !! sertraline (ZOLOFT) 50 MG tablet Take 1 tablet by mouth daily Along with 100 mg for total daily dose 150 mg QD, Disp-90 tablet, R-3Normal      !! sertraline (ZOLOFT) 100 MG tablet Take 1 tablet by mouth daily (take along with 50 mg tablet for total daily dose of 150 mg daily), Disp-90 tablet, R-3Normal      Cholecalciferol (VITAMIN D3) 1.25 MG (16843 UT) CAPS Take 1 capsule by mouth daily Historical Med      nystatin (MYCOSTATIN) 095836 UNIT/GM powder Apply topically 3 times daily as needed Apply topically TID until rash resolved. , Topical, 3 TIMES DAILY PRN, Historical Med      warfarin (COUMADIN)  Difficulty of Paying Living Expenses:    Food Insecurity:     Worried About Running Out of Food in the Last Year:     Ran Out of Food in the Last Year:    Transportation Needs:     Lack of Transportation (Medical):  Lack of Transportation (Non-Medical):    Physical Activity:     Days of Exercise per Week:     Minutes of Exercise per Session:    Stress:     Feeling of Stress :    Social Connections:     Frequency of Communication with Friends and Family:     Frequency of Social Gatherings with Friends and Family:     Attends Gnosticist Services:     Active Member of Clubs or Organizations:     Attends Club or Organization Meetings:     Marital Status:    Intimate Partner Violence:     Fear of Current or Ex-Partner:     Emotionally Abused:     Physically Abused:     Sexually Abused:        SCREENINGS        Marly Coma Scale  Eye Opening: Spontaneous  Best Verbal Response: Oriented  Best Motor Response: Obeys commands  [place] Coma Scale Score: 15               PHYSICAL EXAM    (up to 7 for level 4, 8 or more for level 5)     ED Triage Vitals   BP Temp Temp src Pulse Resp SpO2 Height Weight   -- -- -- -- -- -- -- --       Physical Exam  Vitals and nursing note reviewed. Exam conducted with a chaperone present. Constitutional:       Comments: Comfortable having no pain whatsoever as she sits on her gurney, prefers no pain medication   HENT:      Head: Normocephalic and atraumatic. Nose: Nose normal.   Abdominal:      General: Abdomen is flat. Palpations: Abdomen is soft. Tenderness: There is no abdominal tenderness.    Musculoskeletal:      Comments: Left hip, left ankle without tenderness    Left ankle without tenderness    Left knee without increased temperature noted to touch, no ballottement, no gross deformity, minimal tenderness posterior aspect of the left knee    No rash no erythema--muscle bellies are soft and no tenderness throughout the left thigh, left calf-no signs or symptomatology consistent with compartment syndrome   Skin:     Capillary Refill: Capillary refill takes less than 2 seconds. Findings: No lesion or rash. Neurological:      General: No focal deficit present. Mental Status: She is alert. DIAGNOSTIC RESULTS     EKG: All EKG's are interpreted by the Emergency Department Physician who either signs or Co-signs this chart in the absence of a cardiologist.      RADIOLOGY:   Non-plain film images such as CT, Ultrasound and MRI are read by the radiologist. Plain radiographic images are visualized and preliminarily interpreted by the emergency physician with the below findings:      Interpretation per the Radiologist below, if available at the time of this note:    XR KNEE LEFT (3 VIEWS)   Final Result   Tricompartmental degenerative osteoarthritis, severe in the lateral   compartment with \"bone-on-bone\". Small left knee joint effusion. VL DUP LOWER EXTREMITY VENOUS LEFT    (Results Pending)         ED BEDSIDE ULTRASOUND:   Performed by ED Physician - none    LABS:  Labs Reviewed   CBC WITH AUTO DIFFERENTIAL - Abnormal; Notable for the following components:       Result Value    Seg Neutrophils 71 (*)     Lymphocytes 15 (*)     Immature Granulocytes 1 (*)     Absolute Lymph # 1.05 (*)     All other components within normal limits   BASIC METABOLIC PANEL W/ REFLEX TO MG FOR LOW K - Abnormal; Notable for the following components:    Glucose 133 (*)     Bun/Cre Ratio 22 (*)     Sodium 134 (*)     All other components within normal limits   PROTIME-INR - Abnormal; Notable for the following components:    Protime 22.2 (*)     All other components within normal limits       All other labs were within normal range or not returned as of this dictation.     EMERGENCY DEPARTMENT COURSE and DIFFERENTIAL DIAGNOSIS/MDM:   Vitals:    Vitals:    06/12/21 0853   BP: (!) 166/54   Pulse: 70   Resp: 18   Temp: 97.5 °F (36.4 °C)   TempSrc: Temporal   SpO2: 94% Weight: 215 lb (97.5 kg)         MDM  Number of Diagnoses or Management Options  Knee pain, unspecified chronicity, unspecified laterality  Primary osteoarthritis of other site  Synovial cyst of left popliteal space  Diagnosis management comments: 45-year-old female presents emergency department with concerns of left knee discomfort primarily posterior aspect of left knee    Diagnosis considered #1 occult fracture left knee #2 osteoarthritis #3 inflammatory arthritis #4 DVT #5 Baker's cyst            REASSESSMENT     Patient remained hemodynamic stable nontoxic-appearing and comfortable during emergency department visitation  ED Course as of Jun 12 1549   Sat Jun 12, 2021   1015 Severe osteoarthritis per radiologist read   XR KNEE LEFT (3 VIEWS) [RS]   9025 Basic Metabolic Panel w/ Reflex to MG(!):    Glucose 133(!)   BUN 18   Creatinine 0.83   Bun/Cre Ratio 22(!)   Calcium 9.2   Sodium 134(!)   Potassium 4.3   Chloride 103   CO2 22   Anion Gap 9   GFR Non- >60   GFR  >60   GFR Comment        GFR Staging      [RS]   1052 Protime-INR(!):    Prothrombin Time 22.2(!)   INR 2.0 [RS]   1052 CBC Auto Differential(!):    WBC 7.0   RBC 4.68   Hemoglobin Quant 13.5   Hematocrit 42.8   MCV 91.5   MCH 28.8   MCHC 31.5   RDW 14.0   Platelet Count 404   MPV 9.3   NRBC Automated 0.0   Differential Type NOT REPORTED   Seg Neutrophils 71(!)   Lymphocytes 15(!)   Monocytes 8   Eosinophils % 4   Basophils 1   Immature Granulocytes 1(!)   Segs Absolute 4.99   Absolute Lymph # 1.05(!)   Absolute Mono # 0.55   Absolute Eos # 0.25   Basophils Absolute 0.05   Absolute Immature Granulocyte 0.07   WBC Morpho. .. [RS]   1970 Per technician Baker's cyst, no DVT   VL DUP LOWER EXTREMITY VENOUS LEFT [RS]      ED Course User Index  [RS] Rosana Kearney MD         CRITICAL CARE TIME   Total Critical Care time was minutes, excluding separately reportable procedures.   There was a high probability of clinically significant/life threatening deterioration in the patient's condition which required my urgent intervention. CONSULTS:  None    PROCEDURES:  Unless otherwise noted below, none     Procedures    FINAL IMPRESSION      1. Knee pain, unspecified chronicity, unspecified laterality    2. Synovial cyst of left popliteal space    3. Primary osteoarthritis of other site          DISPOSITION/PLAN   DISPOSITION Decision To Discharge 06/12/2021 10:53:22 AM      PATIENT REFERRED TO:  Kylie Mcnair MD  [address] [address]  [phone]    Call in 3 days        DISCHARGE MEDICATIONS:  Discharge Medication List as of 6/12/2021 10:55 AM        Controlled Substances Monitoring:     RX Monitoring 11/12/2019   Periodic Controlled Substance Monitoring Possible medication side effects, risk of tolerance/dependence & alternative treatments discussed. ;No signs of potential drug abuse or diversion identified. ;Assessed functional status.        (Please note that portions of this note were completed with a voice recognition program.  Efforts were made to edit the dictations but occasionally words are mis-transcribed.)    Beryl Lay MD (electronically signed)  Attending Emergency Physician            Beryl Lay MD  06/12/21 2836

## 2021-06-25 ENCOUNTER — TELEPHONE (OUTPATIENT)
Dept: PHARMACY | Age: 81
End: 2021-06-25

## 2021-07-15 ENCOUNTER — TELEPHONE (OUTPATIENT)
Dept: PHARMACY | Age: 81
End: 2021-07-15

## 2021-07-15 NOTE — TELEPHONE ENCOUNTER
Recent Travel Screening and Travel History documentation:     Travel Screening     Question   Response    In the last month, have you been in contact with someone who was confirmed or suspected to have Coronavirus / COVID-19? No / Unsure    Have you had a COVID-19 viral test in the last 14 days? No    Do you have any of the following new or worsening symptoms? None of these    Have you traveled internationally or domestically in the last month?   No      Travel History   Travel since 06/15/21     No documented travel since 06/15/21

## 2021-07-16 ENCOUNTER — HOSPITAL ENCOUNTER (OUTPATIENT)
Dept: PHARMACY | Age: 81
Setting detail: THERAPIES SERIES
Discharge: HOME | End: 2021-07-16
Payer: MEDICARE

## 2021-07-16 VITALS
HEART RATE: 75 BPM | BODY MASS INDEX: 37.14 KG/M2 | DIASTOLIC BLOOD PRESSURE: 76 MMHG | SYSTOLIC BLOOD PRESSURE: 131 MMHG | WEIGHT: 213 LBS

## 2021-07-16 DIAGNOSIS — Z79.01 LONG TERM CURRENT USE OF ANTICOAGULANT THERAPY: Primary | ICD-10-CM

## 2021-07-16 LAB — INR BLD: 1.6

## 2021-07-16 PROCEDURE — 99211 OFF/OP EST MAY X REQ PHY/QHP: CPT

## 2021-07-16 PROCEDURE — 85610 PROTHROMBIN TIME: CPT

## 2021-07-16 NOTE — PATIENT INSTRUCTIONS
Please take 1.5 tablets today (=11.25 mg). Continue current dose of warfarin as instructed on dosing calendar provided - 7.5 mg daily. Return to clinic in 4 weeks. Continue to monitor urine and stool for signs and symptoms of bleeding. Please notify the clinic of any medication changes.

## 2021-07-16 NOTE — PROGRESS NOTES
[facility] [address] [place] [address]/[place]  Medication Management  ANTICOAGULATION    Referring Provider: Dung Lackey INR: 2-3    TODAY'S INR: 1.6    WARFARIN Dosage: Patient will continue warfarin 7.5 mg po daily. Patient will take warfarin 11.25 mg po today for 1 dose. INR (no units)   Date Value   07/16/2021 1.6   06/12/2021 2.0   05/18/2021 2.1   04/01/2021 2   02/25/2021 2.1   02/03/2021 3.5   01/06/2021 3.4       Medication changes:  No changes    Notes:    Fingerstick INR drawn per clinic protocol. Patient states no visible blood in urine and no black tarry stool. Patient reports that she missed warfarin dose yesterday. No change in other maintenance medications or in diet. Will recheck INR in 4 weeks. Patient acknowledges working in consult agreement with pharmacist as referred by his/her physician. For Pharmacy Admin Tracking Only     Intervention Detail: Dose Adjustment: 1, reason: Therapy Optimization   Total # of Interventions Recommended: 1   Total # of Interventions Accepted: 1   Time Spent (min): [address]  [place], [facility]

## 2021-08-11 ENCOUNTER — ANTI-COAG VISIT (OUTPATIENT)
Dept: PHARMACY | Age: 81
End: 2021-08-11

## 2021-08-11 DIAGNOSIS — Z79.01 LONG TERM CURRENT USE OF ANTICOAGULANT THERAPY: Primary | ICD-10-CM

## 2021-08-12 ENCOUNTER — TELEPHONE (OUTPATIENT)
Dept: PHARMACY | Age: 81
End: 2021-08-12

## 2021-08-12 NOTE — TELEPHONE ENCOUNTER
Recent Travel Screening and Travel History documentation:     Travel Screening     Question   Response    In the last month, have you been in contact with someone who was confirmed or suspected to have Coronavirus / COVID-19? Unable to assess    Have you had a COVID-19 viral test in the last 14 days? Unable to assess    Do you have any of the following new or worsening symptoms? Unable to assess    Have you traveled internationally or domestically in the last month?   Unable to assess      Travel History   Travel since 07/12/21     No documented travel since 07/12/21

## 2021-08-13 ENCOUNTER — HOSPITAL ENCOUNTER (OUTPATIENT)
Dept: PHARMACY | Age: 81
Setting detail: THERAPIES SERIES
Discharge: HOME | End: 2021-08-13
Payer: MEDICARE

## 2021-08-13 VITALS
DIASTOLIC BLOOD PRESSURE: 73 MMHG | WEIGHT: 214 LBS | SYSTOLIC BLOOD PRESSURE: 125 MMHG | BODY MASS INDEX: 37.31 KG/M2 | HEART RATE: 75 BPM

## 2021-08-13 DIAGNOSIS — Z79.01 LONG TERM CURRENT USE OF ANTICOAGULANT THERAPY: Primary | ICD-10-CM

## 2021-08-13 LAB — INR BLD: 1.4

## 2021-08-13 PROCEDURE — 85610 PROTHROMBIN TIME: CPT

## 2021-08-13 PROCEDURE — 99212 OFFICE O/P EST SF 10 MIN: CPT

## 2021-08-13 NOTE — PROGRESS NOTES
[facility]/[place]  Medication Management  ANTICOAGULATION    Referring Provider: Zeinab Failing     GOAL INR: 2-3     TODAY'S INR: 1.4     WARFARIN Dosage: Patient will take 2 tablets for 15 mg today then  continue warfarin 7.5 mg po daily. INR (no units)   Date Value   2021 1.6   2021 2.0   2021 2.1   2021 2   2021 2.1   2021 3.5   2021 3.4     Medication changes:  None    Notes:    Fingerstick INR drawn per clinic protocol. Patient states no visible blood in urine and no black tarry stool. Denies any missed doses of warfarin. No change in other maintenance medications or in diet. Will recheck INR in 2 weeks. Patient acknowledges working in consult agreement with pharmacist as referred by his/her physician. Patient has been missing all of her medications as she gets confused with all her travel between [facility]. I recommended patient try tape or stickers to help her take the correct day of the week from her pill box and not forget doses.          For Pharmacy Admin Tracking Only     Intervention Detail: Adherence Monitorin and Dose Adjustment: 1, reason: Therapy Optimization   Total # of Interventions Recommended: 1   Total # of Interventions Accepted: 1   Time Spent (min): [address], BELTRAN F[facility], PharmD

## 2021-08-13 NOTE — PATIENT INSTRUCTIONS
Continue to monitor urine and stool. Continue to monitor for signs of bleeding. Return to clinic in 2 weeks. take 2 tablets for 15 mg today then  continue warfarin 7.5 mg po daily.

## 2021-08-14 NOTE — PROGRESS NOTES
Rx called to [facility] for Coumadin 7.5 mg #90 tablets 1 daily as directed with 1 refill.   Treva Jay [facility] 8/14/2021, 9:36 AM

## 2021-08-25 ENCOUNTER — TELEPHONE (OUTPATIENT)
Dept: PHARMACY | Age: 81
End: 2021-08-25

## 2021-08-25 NOTE — TELEPHONE ENCOUNTER
Recent Travel Screening and Travel History documentation:     Travel Screening     Question   Response    In the last month, have you been in contact with someone who was confirmed or suspected to have Coronavirus / COVID-19? No / Unsure    Have you had a COVID-19 viral test in the last 14 days? No    Do you have any of the following new or worsening symptoms? None of these    Have you traveled internationally or domestically in the last month?   No      Travel History   Travel since 07/25/21     No documented travel since 07/25/21         Petr Car, PharmD 8/25/2021 2:25 PM

## 2021-08-26 ENCOUNTER — HOSPITAL ENCOUNTER (OUTPATIENT)
Dept: PHARMACY | Age: 81
Setting detail: THERAPIES SERIES
Discharge: HOME | End: 2021-08-26
Payer: MEDICARE

## 2021-08-26 VITALS
HEART RATE: 81 BPM | BODY MASS INDEX: 37.31 KG/M2 | SYSTOLIC BLOOD PRESSURE: 123 MMHG | WEIGHT: 214 LBS | DIASTOLIC BLOOD PRESSURE: 56 MMHG

## 2021-08-26 DIAGNOSIS — Z79.01 LONG TERM CURRENT USE OF ANTICOAGULANT THERAPY: Primary | ICD-10-CM

## 2021-08-26 LAB — INR BLD: 3.1

## 2021-08-26 PROCEDURE — 85610 PROTHROMBIN TIME: CPT

## 2021-08-26 PROCEDURE — 99211 OFF/OP EST MAY X REQ PHY/QHP: CPT

## 2021-08-26 NOTE — PATIENT INSTRUCTIONS
Please hold warfarin today. Continue current dose of warfarin as instructed on dosing calendar provided - 7.5 mg daily. Return to clinic in 4 weeks. Continue to monitor urine and stool for signs and symptoms of bleeding. Please notify the clinic of any medication changes.

## 2021-08-26 NOTE — PROGRESS NOTES
[facility] [address] [place] [address]/[place]  Medication Management  ANTICOAGULATION    Referring Provider: Skip Myers INR: 2-3    TODAY'S INR: 3.1    WARFARIN Dosage: Continue warfarin 7.5 mg po daily  Patient will hold warfarin today. INR (no units)   Date Value   08/26/2021 3.1   08/13/2021 1.4   07/16/2021 1.6   06/12/2021 2.0   05/18/2021 2.1   04/01/2021 2   02/25/2021 2.1       Medication changes:  No changes    Notes:    Fingerstick INR drawn per clinic protocol. Patient states no visible blood in urine and no black tarry stool. Denies any missed doses of warfarin. No change in other maintenance medications or in diet. Patient did have 2 mixed drinks in past 2 days which may explain slightly elevated INR today. Will recheck INR in 4 weeks. Patient acknowledges working in consult agreement with pharmacist as referred by his/her physician. For Pharmacy Admin Tracking Only     Intervention Detail: Dose Adjustment: 1, reason: Therapy Optimization   Total # of Interventions Recommended: 1   Total # of Interventions Accepted: 1   Time Spent (min): Karen.  Chris England, [address] [facility]

## 2021-09-19 ENCOUNTER — TELEPHONE (OUTPATIENT)
Dept: OBGYN | Age: 81
End: 2021-09-19

## 2021-09-19 NOTE — TELEPHONE ENCOUNTER
Patient calls after hours with concern for nausea/vomiting and abdominal cramping. Reports a hx of ovarian cyst.  No vaginal bleeding, and no abdominal pain or tenderness.   Advised to monitor symptoms and to call back if worsening otherwise can call office in am.  Pt. Agrees.--PSRI./CHA

## 2021-09-20 ENCOUNTER — TELEPHONE (OUTPATIENT)
Dept: OBGYN | Age: 81
End: 2021-09-20

## 2021-09-22 ENCOUNTER — TELEPHONE (OUTPATIENT)
Dept: PHARMACY | Age: 81
End: 2021-09-22

## 2021-09-22 NOTE — TELEPHONE ENCOUNTER
Recent Travel Screening and Travel History documentation:     Travel Screening     Question   Response    In the last month, have you been in contact with someone who was confirmed or suspected to have Coronavirus / COVID-19? No / Unsure    Have you had a COVID-19 viral test in the last 14 days? No    Do you have any of the following new or worsening symptoms? None of these    Have you traveled internationally or domestically in the last month?   No      Travel History   Travel since 08/22/21     No documented travel since 08/22/21

## 2021-09-23 ENCOUNTER — HOSPITAL ENCOUNTER (OUTPATIENT)
Dept: PHARMACY | Age: 81
Setting detail: THERAPIES SERIES
Discharge: HOME | End: 2021-09-23
Payer: MEDICARE

## 2021-09-23 DIAGNOSIS — Z79.01 LONG TERM CURRENT USE OF ANTICOAGULANT THERAPY: Primary | ICD-10-CM

## 2021-09-23 LAB — INR BLD: 2.1

## 2021-09-23 PROCEDURE — 85610 PROTHROMBIN TIME: CPT

## 2021-09-23 PROCEDURE — 99211 OFF/OP EST MAY X REQ PHY/QHP: CPT

## 2021-09-23 NOTE — PATIENT INSTRUCTIONS
Continue to monitor urine and stool. Continue to monitor for signs of bleeding. Return to clinic in 1 week. Continue warfarin 7.5 mg po daily. May want to try some probiotic while on Augmentin.

## 2021-09-23 NOTE — PROGRESS NOTES
[employer]/[place]  Medication Management  ANTICOAGULATION    Referring Provider: Go Vang     GOAL INR: 2-3     TODAY'S INR: 2.1     WARFARIN Dosage: Continue warfarin 7.5 mg po daily. SAW PATIENT OUTSIDE AT HER CAR DUE TO HER NOT FEELING WELL. INR (no units)   Date Value   2021 3.1   2021 1.4   2021 1.6   2021 2.0   2021 2.1   2021 2   2021 2.1     Medication changes:  AUGMENTIN  ZOFRAN    Notes:    Fingerstick INR drawn per clinic protocol. Patient states no visible blood in urine and no black tarry stool. Denies any missed doses of warfarin. No change in other maintenance medications or in diet. Will recheck INR in 1 week. Patient acknowledges working in consult agreement with pharmacist as referred by his/her physician. Patient had some diarrhea, nausea, HA and is cold. Patient was prescribed Augmentin and Zofran. May want to try some probiotic while on Augmentin.             For Pharmacy Admin Tracking Only     Intervention Detail: Adherence Monitorin and New Rx: 2, reason: Needs Additional Therapy   Total # of Interventions Recommended: 2   Total # of Interventions Accepted: 2   Time Spent (min): 601 [facility], [address] [place] [address], PharmD

## 2021-09-30 ENCOUNTER — TELEPHONE (OUTPATIENT)
Dept: PHARMACY | Age: 81
End: 2021-09-30

## 2021-09-30 NOTE — TELEPHONE ENCOUNTER
Recent Travel Screening and Travel History documentation:     Travel Screening     Question   Response    In the last month, have you been in contact with someone who was confirmed or suspected to have Coronavirus / COVID-19? No / Unsure    Have you had a COVID-19 viral test in the last 14 days? No    Do you have any of the following new or worsening symptoms? None of these    Have you traveled internationally or domestically in the last month?   No      Travel History   Travel since 08/30/21     No documented travel since 08/30/21         Lenore Spencer, PharmD 9/30/2021 9:04 AM

## 2021-10-01 ENCOUNTER — HOSPITAL ENCOUNTER (OUTPATIENT)
Dept: PHARMACY | Age: 81
Setting detail: THERAPIES SERIES
Discharge: HOME | End: 2021-10-01
Payer: MEDICARE

## 2021-10-01 VITALS
DIASTOLIC BLOOD PRESSURE: 81 MMHG | HEART RATE: 74 BPM | BODY MASS INDEX: 35.74 KG/M2 | WEIGHT: 205 LBS | SYSTOLIC BLOOD PRESSURE: 138 MMHG

## 2021-10-01 DIAGNOSIS — Z79.01 LONG TERM CURRENT USE OF ANTICOAGULANT THERAPY: Primary | ICD-10-CM

## 2021-10-01 LAB — INR BLD: 1.9

## 2021-10-01 PROCEDURE — 85610 PROTHROMBIN TIME: CPT

## 2021-10-01 PROCEDURE — 99211 OFF/OP EST MAY X REQ PHY/QHP: CPT

## 2021-10-01 NOTE — PROGRESS NOTES
[facility] [address] [place] [address]/[place]  Medication Management  ANTICOAGULATION    Referring Provider: Syl     GOAL INR: 2-3     TODAY'S INR: 1.9     WARFARIN Dosage: Take warfarin 1.5 tablets for 11.25 mg today then continue warfarin 7.5 mg po daily. INR (no units)   Date Value   2021 2.1   2021 3.1   2021 1.4   2021 1.6   2021 2.0   2021 2.1   2021 2     Medication changes:  Finished Augmentin  probiotic    Notes:    Fingerstick INR drawn per clinic protocol. Patient states no visible blood in urine and no black tarry stool. Denies any missed doses of warfarin. No change in other maintenance medications or in diet. Will recheck INR in 3 weeks. Patient acknowledges working in consult agreement with pharmacist as referred by his/her physician. Patient had some diarrhea, nausea, HA and is cold. Patient was prescribed Augmentin and Zofran.               For Pharmacy Admin Tracking Only     Intervention Detail: Adherence Monitorin, Dose Adjustment: 1, reason: Therapy Optimization and New Rx: 1, reason: Needs Additional Therapy   Total # of Interventions Recommended: 2   Total # of Interventions Accepted: 2   Time Spent (min): 601 [facility], [facility], PharmD

## 2021-10-01 NOTE — PATIENT INSTRUCTIONS
Continue to monitor urine and stool. Continue to monitor for signs of bleeding. Return to clinic in 3 weeks. Take warfarin 1.5 tablets for 11.25 mg today then continue warfarin 7.5 mg po daily.

## 2021-10-04 ENCOUNTER — HOSPITAL ENCOUNTER (OUTPATIENT)
Age: 81
Discharge: HOME | End: 2021-10-04
Payer: MEDICARE

## 2021-10-04 DIAGNOSIS — E78.2 MIXED HYPERLIPIDEMIA: ICD-10-CM

## 2021-10-04 DIAGNOSIS — R73.01 IMPAIRED FASTING GLUCOSE: ICD-10-CM

## 2021-10-04 LAB
ALT SERPL-CCNC: 11 U/L (ref 5–33)
ANION GAP SERPL CALCULATED.3IONS-SCNC: 13 MMOL/L (ref 9–17)
AST SERPL-CCNC: 14 U/L
BUN BLDV-MCNC: 14 MG/DL (ref 8–23)
BUN/CREAT BLD: 16 (ref 9–20)
CALCIUM SERPL-MCNC: 9.2 MG/DL (ref 8.6–10.4)
CHLORIDE BLD-SCNC: 108 MMOL/L (ref 98–107)
CHOLESTEROL/HDL RATIO: 5.1
CHOLESTEROL: 225 MG/DL
CO2: 21 MMOL/L (ref 20–31)
CREAT SERPL-MCNC: 0.89 MG/DL (ref 0.5–0.9)
ESTIMATED AVERAGE GLUCOSE: 140 MG/DL
GFR AFRICAN AMERICAN: >60 ML/MIN
GFR NON-AFRICAN AMERICAN: >60 ML/MIN
GFR SERPL CREATININE-BSD FRML MDRD: ABNORMAL ML/MIN/{1.73_M2}
GFR SERPL CREATININE-BSD FRML MDRD: ABNORMAL ML/MIN/{1.73_M2}
GLUCOSE BLD-MCNC: 124 MG/DL (ref 70–99)
HBA1C MFR BLD: 6.5 % (ref 4–6)
HCT VFR BLD CALC: 43.4 % (ref 36.3–47.1)
HDLC SERPL-MCNC: 44 MG/DL
HEMOGLOBIN: 13.3 G/DL (ref 11.9–15.1)
LDL CHOLESTEROL: 139 MG/DL (ref 0–130)
MCH RBC QN AUTO: 29.1 PG (ref 25.2–33.5)
MCHC RBC AUTO-ENTMCNC: 30.6 G/DL (ref 28.4–34.8)
MCV RBC AUTO: 95 FL (ref 82.6–102.9)
NRBC AUTOMATED: 0 PER 100 WBC
PDW BLD-RTO: 13.7 % (ref 11.8–14.4)
PLATELET # BLD: 342 K/UL (ref 138–453)
PMV BLD AUTO: 9.8 FL (ref 8.1–13.5)
POTASSIUM SERPL-SCNC: 4.3 MMOL/L (ref 3.7–5.3)
RBC # BLD: 4.57 M/UL (ref 3.95–5.11)
SODIUM BLD-SCNC: 142 MMOL/L (ref 135–144)
TRIGL SERPL-MCNC: 209 MG/DL
VLDLC SERPL CALC-MCNC: ABNORMAL MG/DL (ref 1–30)
WBC # BLD: 8 K/UL (ref 3.5–11.3)

## 2021-10-04 PROCEDURE — 84460 ALANINE AMINO (ALT) (SGPT): CPT

## 2021-10-04 PROCEDURE — 84450 TRANSFERASE (AST) (SGOT): CPT

## 2021-10-04 PROCEDURE — 80048 BASIC METABOLIC PNL TOTAL CA: CPT

## 2021-10-04 PROCEDURE — 80061 LIPID PANEL: CPT

## 2021-10-04 PROCEDURE — 85027 COMPLETE CBC AUTOMATED: CPT

## 2021-10-04 PROCEDURE — 83036 HEMOGLOBIN GLYCOSYLATED A1C: CPT

## 2021-10-04 PROCEDURE — 36415 COLL VENOUS BLD VENIPUNCTURE: CPT

## 2021-10-20 ENCOUNTER — TELEPHONE (OUTPATIENT)
Dept: PHARMACY | Age: 81
End: 2021-10-20

## 2021-10-20 NOTE — TELEPHONE ENCOUNTER
Recent Travel Screening and Travel History documentation:     Travel Screening     Question   Response    In the last month, have you been in contact with someone who was confirmed or suspected to have Coronavirus / COVID-19? No / Unsure    Have you had a COVID-19 viral test in the last 14 days? No    Do you have any of the following new or worsening symptoms? None of these    Have you traveled internationally or domestically in the last month?   No      Travel History   Travel since 09/20/21     No documented travel since 09/20/21         Ankush May, VicD 10/20/2021 11:47 AM

## 2021-10-21 ENCOUNTER — OFFICE VISIT (OUTPATIENT)
Dept: OBGYN | Age: 81
End: 2021-10-21
Payer: MEDICARE

## 2021-10-21 ENCOUNTER — HOSPITAL ENCOUNTER (OUTPATIENT)
Dept: PHARMACY | Age: 81
Setting detail: THERAPIES SERIES
Discharge: HOME | End: 2021-10-21
Payer: MEDICARE

## 2021-10-21 VITALS
SYSTOLIC BLOOD PRESSURE: 127 MMHG | DIASTOLIC BLOOD PRESSURE: 78 MMHG | HEART RATE: 93 BPM | WEIGHT: 207 LBS | BODY MASS INDEX: 36.67 KG/M2

## 2021-10-21 VITALS
BODY MASS INDEX: 36.68 KG/M2 | HEIGHT: 63 IN | DIASTOLIC BLOOD PRESSURE: 74 MMHG | SYSTOLIC BLOOD PRESSURE: 124 MMHG | WEIGHT: 207 LBS

## 2021-10-21 DIAGNOSIS — Z79.01 LONG TERM CURRENT USE OF ANTICOAGULANT THERAPY: Primary | ICD-10-CM

## 2021-10-21 DIAGNOSIS — L30.4 INTERTRIGO: ICD-10-CM

## 2021-10-21 DIAGNOSIS — N83.202 LEFT OVARIAN CYST: Primary | ICD-10-CM

## 2021-10-21 LAB — INR BLD: 4.1

## 2021-10-21 PROCEDURE — 99212 OFFICE O/P EST SF 10 MIN: CPT

## 2021-10-21 PROCEDURE — 99213 OFFICE O/P EST LOW 20 MIN: CPT | Performed by: OBSTETRICS & GYNECOLOGY

## 2021-10-21 PROCEDURE — 85610 PROTHROMBIN TIME: CPT

## 2021-10-21 RX ORDER — NYSTATIN 100000 [USP'U]/G
POWDER TOPICAL
Qty: 1 EACH | Refills: 5 | Status: SHIPPED | OUTPATIENT
Start: 2021-10-21

## 2021-10-21 NOTE — PROGRESS NOTES
[facility] [address] [place] [address]/[place]  Medication Management  ANTICOAGULATION    Referring Provider: Syl     GOAL INR: 2-3     TODAY'S INR: 4.1     WARFARIN Dosage: Hold warfarin today and tomorrow then continue warfarin whole 7.5 mg tablet po daily. INR (no units)   Date Value   10/21/2021 4.1   10/01/2021 1.9   2021 2.1   2021 3.1   2021 1.4   2021 1.6   2021 2.0     Medication changes:  none    Notes:    Fingerstick INR drawn per clinic protocol. Patient states no visible blood in urine and no black tarry stool. Denies any missed doses of warfarin. No change in other maintenance medications or in diet. Will recheck INR in 4 weeks (when back from [facility]). Patient acknowledges working in consult agreement with pharmacist as referred by his/her physician. Patient may have taken warfarin wrong.     Patient is going to [facility] and a cruise before [place] through end  ([facility])             For Pharmacy Admin Tracking Only     Intervention Detail: Adherence Monitorin and Dose Adjustment: 2, reason: Therapy Optimization   Total # of Interventions Recommended: 2   Total # of Interventions Accepted: 2   Time Spent (min): [facility], [facility], PharmD

## 2021-10-21 NOTE — PATIENT INSTRUCTIONS
Continue to monitor urine and stool. Continue to monitor for signs of bleeding. Return to clinic in 4 weeks. Hold warfarin today and tomorrow then continue warfarin whole 7.5 mg tablet po daily.

## 2021-10-21 NOTE — PROGRESS NOTES
PROBLEM VISIT     Date of service: 10/21/2021    Connor King  Is a [de-identified] y.o.  female    PT's PCP is: Alexis Parada MD     : 1940                                             Subjective:       No LMP recorded. Patient is postmenopausal.     OB History    Para Term  AB Living   3 3 3         SAB TAB Ectopic Molar Multiple Live Births                    # Outcome Date GA Lbr Shukri/2nd Weight Sex Delivery Anes PTL Lv   3 Term 10/15/77    F Vag-Spont Spinal     2 Term 70    M Vag-Spont Spinal     1 Term 68    M Vag-Spont           Social History     Tobacco Use   Smoking Status Former Smoker    Packs/day: 1.00    Years: 17.00    Pack years: 17.00    Quit date: 1979    Years since quittin.8   Smokeless Tobacco Never Used        Social History     Substance and Sexual Activity   Alcohol Use No       Social History     Substance and Sexual Activity   Sexual Activity Not on file       Allergies: Latex and Percocet [oxycodone-acetaminophen]    Chief Complaint   Patient presents with    Follow-up     Patient is being seen after GYN US. She notes that the left side pelvic pain continues to come and go. Last Yearly:      Last pap: 2019    Last HPV:       NURSE: Noe Andrews RMJESSY      PE:  Vital Signs  Blood pressure 124/74, height 5' 3\" (1.6 m), weight 207 lb (93.9 kg), not currently breastfeeding. Labs:    No results found for this visit on 10/21/21. NURSE: Prince Freitas    HPI: Patient is here today for a follow-up regarding known left ovarian cyst.  She denies having any significant problems from this.     Of significance she does have history of a ventral abdominal hernia repair    No  PT denies fever, chills, nausea and vomiting       Objective[de-identified] I have reviewed findings regarding this left ovarian cyst.  This was first found about 2 years ago and was small under 2 cm in size unfortunately since this time it has slowly grown to 4 to 5 cm in size so more than doubled. It is completely clear on ultrasound    The patient has a severe degree of intertrigo all along her lower abdominal fold. 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 As it is growing will consider laparoscopic surgery to have it removed. The patient is soon going to be going to [place] and is concerned about Covid in this area so she would like to postpone this until she returns early this winter. I see no reason that this could not wait until that such time    In any event intertrigo would have to be resolved before elective procedure. Diagnosis Orders   1. Left ovarian cyst     2. Intertrigo  nystatin (MYCOSTATIN) 321094 UNIT/GM powder             No follow-ups on file. FF: 20 minutes    There are no Patient Instructions on file for this visit. Over 75%of time spent on counseling and care coordination on: see assessment and plan,  She was also counseled on her preventative health maintenance recommendations and follow-up.       Elma Stanford MD,10/21/2021 9:54 AM

## 2021-10-26 ENCOUNTER — HOSPITAL ENCOUNTER (OUTPATIENT)
Dept: SLEEP CENTER | Age: 81
Discharge: HOME | End: 2021-10-26
Payer: MEDICARE

## 2021-10-26 DIAGNOSIS — R53.83 FATIGUE, UNSPECIFIED TYPE: ICD-10-CM

## 2021-10-26 DIAGNOSIS — R40.0 DAYTIME SOMNOLENCE: ICD-10-CM

## 2021-10-26 PROCEDURE — 95806 SLEEP STUDY UNATT&RESP EFFT: CPT

## 2021-11-05 LAB — STATUS: NORMAL

## 2021-11-17 ENCOUNTER — TELEPHONE (OUTPATIENT)
Dept: PHARMACY | Age: 81
End: 2021-11-17

## 2021-11-17 NOTE — TELEPHONE ENCOUNTER
Recent Travel Screening and Travel History documentation:     Travel Screening     Question   Response    In the last month, have you been in contact with someone who was confirmed or suspected to have Coronavirus / COVID-19? Unable to assess    Have you had a COVID-19 viral test in the last 14 days? Unable to assess    Do you have any of the following new or worsening symptoms? Unable to assess    Have you traveled internationally or domestically in the last month? Unable to assess      Travel History   Travel since 10/17/21    No documented travel since 10/17/21        Had to leave [facility].     Gita Verdugo, PharmD 11/17/2021 12:03 PM

## 2021-11-18 ENCOUNTER — HOSPITAL ENCOUNTER (OUTPATIENT)
Dept: PHARMACY | Age: 81
Setting detail: THERAPIES SERIES
Discharge: HOME | End: 2021-11-18
Payer: MEDICARE

## 2021-11-18 VITALS
WEIGHT: 208.2 LBS | SYSTOLIC BLOOD PRESSURE: 121 MMHG | DIASTOLIC BLOOD PRESSURE: 92 MMHG | HEART RATE: 87 BPM | BODY MASS INDEX: 36.88 KG/M2

## 2021-11-18 DIAGNOSIS — Z79.01 LONG TERM CURRENT USE OF ANTICOAGULANT THERAPY: Primary | ICD-10-CM

## 2021-11-18 LAB — INR BLD: 4.1

## 2021-11-18 PROCEDURE — 85610 PROTHROMBIN TIME: CPT | Performed by: FAMILY MEDICINE

## 2021-11-18 PROCEDURE — 99212 OFFICE O/P EST SF 10 MIN: CPT | Performed by: FAMILY MEDICINE

## 2021-11-18 NOTE — PATIENT INSTRUCTIONS
Please do not take warfarin today then decrease your weekly dose to take 3.75mg (1/2 tablet) Wednesdays and 1 tablet (7.5mg) all other days. Continue to monitor for signs of bleeding. Return to coumadin clinic in 4 weeks.

## 2021-12-10 ENCOUNTER — TELEPHONE (OUTPATIENT)
Dept: PHARMACY | Age: 81
End: 2021-12-10

## 2021-12-10 NOTE — TELEPHONE ENCOUNTER
Recent Travel Screening and Travel History documentation:     Travel Screening     Question   Response    In the last month, have you been in contact with someone who was confirmed or suspected to have Coronavirus / COVID-19? Unable to assess    Have you had a COVID-19 viral test in the last 14 days? Unable to assess    Do you have any of the following new or worsening symptoms? Unable to assess    Have you traveled internationally or domestically in the last month?   Unable to assess      Travel History   Travel since 11/10/21    No documented travel since 11/10/21

## 2021-12-12 ENCOUNTER — HOSPITAL ENCOUNTER (OUTPATIENT)
Dept: SLEEP CENTER | Age: 81
Discharge: HOME | End: 2021-12-12
Payer: MEDICARE

## 2021-12-12 VITALS — BODY MASS INDEX: 36.32 KG/M2 | HEIGHT: 63 IN | WEIGHT: 205 LBS

## 2021-12-12 DIAGNOSIS — G47.30 SLEEP APNEA, UNSPECIFIED TYPE: ICD-10-CM

## 2021-12-12 PROCEDURE — 95811 POLYSOM 6/>YRS CPAP 4/> PARM: CPT

## 2021-12-12 ASSESSMENT — SLEEP AND FATIGUE QUESTIONNAIRES
ESS TOTAL SCORE: 20
I DO OR HAVE BEEN TOLD I SNORE DURING SLEEP: YES, AS AN ADULT OR ADOLESCENT
HOW OFTEN HAVE YOU NODDED OFF OR FALLEN ASLEEP WHILE DRIVING: 1-2 TIMES A WEEK
HOW LIKELY ARE YOU TO NOD OFF OR FALL ASLEEP WHILE SITTING INACTIVE IN A PUBLIC PLACE: 3
HOW LIKELY ARE YOU TO NOD OFF OR FALL ASLEEP IN A CAR, WHILE STOPPED FOR A FEW MINUTES IN TRAFFIC: 2
DO YOU SNORE: YES
NUMBER OF TIMES YOU WAKE PER NIGHT: 4
HOW MANY NAPS DO YOU TAKE PER WEEK: 5
NORMAL AMOUNT OF SLEEP PER NIGHT: 5
HOW LIKELY ARE YOU TO NOD OFF OR FALL ASLEEP WHILE WATCHING TV: 3
HAS ANYONE NOTICED THAT YOU QUIT BREATHING DURING SLEEP: NEVER/ALMOST NEVER
HOW LIKELY ARE YOU TO NOD OFF OR FALL ASLEEP WHILE SITTING AND TALKING TO SOMEONE: 0
DO YOU HAVE HIGH BLOOD PRESSURE: NO
WHAT TIME DO YOU USUALLY GO TO BED: 72000
USUAL AMOUNT OF TIME TO FALL ASLEEP (MIN): 45
HOW OFTEN DO YOU SNORE: ALMOST DAILY
HOW LIKELY ARE YOU TO NOD OFF OR FALL ASLEEP WHILE LYING DOWN TO REST IN THE AFTERNOON WHEN CIRCUMSTANCES PERMIT: 3
I SLEEP WELL: NO
ARE YOU TIRED AFTER SLEEPING: ALMOST DAILY
SNORING VOLUME: AS LOUD AS TALKING
FOR THE FIRST 30 MINUTES AFTER WAKING, I AM: SOMEWHAT DROWSY
HOW LIKELY ARE YOU TO NOD OFF OR FALL ASLEEP WHILE SITTING AND READING: 3
HOW LIKELY ARE YOU TO NOD OFF OR FALL ASLEEP WHILE SITTING QUIETLY AFTER LUNCH WITHOUT ALCOHOL: 3
DOES YOUR SNORING BOTHER OTHERS: NO
ARE YOU TIRED DURING WAKE TIME: ALMOST DAILY
HOW LIKELY ARE YOU TO NOD OFF OR FALL ASLEEP WHEN YOU ARE A PASSENGER IN A CAR FOR AN HOUR WITHOUT A BREAK: 3
HAVE YOU EVER NODDED OFF OR FALLEN ASLEEP WHILE DRIVING: YES
WHAT TIME DO YOU USUALLY WAKE UP: 19800

## 2021-12-13 ENCOUNTER — HOSPITAL ENCOUNTER (OUTPATIENT)
Dept: PHARMACY | Age: 81
Setting detail: THERAPIES SERIES
Discharge: HOME | End: 2021-12-13
Payer: MEDICARE

## 2021-12-13 VITALS
WEIGHT: 207 LBS | DIASTOLIC BLOOD PRESSURE: 71 MMHG | HEART RATE: 81 BPM | SYSTOLIC BLOOD PRESSURE: 147 MMHG | BODY MASS INDEX: 36.67 KG/M2

## 2021-12-13 DIAGNOSIS — Z79.01 LONG TERM CURRENT USE OF ANTICOAGULANT THERAPY: Primary | ICD-10-CM

## 2021-12-13 LAB — INR BLD: 3.9

## 2021-12-13 PROCEDURE — 85610 PROTHROMBIN TIME: CPT | Performed by: FAMILY MEDICINE

## 2021-12-13 PROCEDURE — 99212 OFFICE O/P EST SF 10 MIN: CPT | Performed by: FAMILY MEDICINE

## 2021-12-13 NOTE — PROGRESS NOTES
[facility] [address] [facility]/[place]  Medication Management  ANTICOAGULATION    Referring Provider: Dr Pedro Viera INR: 2.0-3.0    TODAY'S INR: 3.9    WARFARIN Dosage: hold x1 then decrease to 3.75mg MWF, 7.5mg all other days    INR (no units)   Date Value   12/13/2021 3.9   11/18/2021 4.1   10/21/2021 4.1   10/01/2021 1.9   09/23/2021 2.1   08/26/2021 3.1   08/13/2021 1.4       Medication changes:  No changes    Notes:    Fingerstick INR drawn per clinic protocol. Patient states no visible blood in urine and no black tarry stool. Denies any missed doses of warfarin. No change in other maintenance medications or in diet. Will recheck INR in 5 weeks due to travel plans. Patient had a sleep study last night and was told \"you slept well\". Patient will get results at a later date. Patient is leaving for [place] on 12/17 and returns 11/22. Diet will be very different and states she will likely have more cocktails than normal.  Patient will hold warfarin today then decrease weekly dosage. Patient instructed to contact clinic if any concerns while traveling. Patient acknowledges working in consult agreement with pharmacist as referred by his/her physician.                   For Pharmacy Admin Tracking Only     Intervention Detail: Dose Adjustment: 3, reason: Therapy Optimization   Total # of Interventions Recommended: 4   Total # of Interventions Accepted: 4   Time Spent (min): 30      Skylar Mcadams R.Ph., 12/13/2021,7:31 AM

## 2021-12-13 NOTE — PATIENT INSTRUCTIONS
Please do not take warfarin today then decrease your dosing to take 1/2 tablet (3.75mg) on Mondays, Wednesdays and Fridays and 1 tablet (7.5mg) all other days. Continue to monitor for signs of bleeding. Return to coumadin clinic in 6 weeks.

## 2021-12-15 LAB — STATUS: NORMAL

## 2022-01-20 ENCOUNTER — OFFICE VISIT (OUTPATIENT)
Dept: OBGYN | Age: 82
End: 2022-01-20
Payer: MEDICARE

## 2022-01-20 VITALS
BODY MASS INDEX: 37.03 KG/M2 | WEIGHT: 209 LBS | HEIGHT: 63 IN | SYSTOLIC BLOOD PRESSURE: 124 MMHG | DIASTOLIC BLOOD PRESSURE: 74 MMHG

## 2022-01-20 DIAGNOSIS — N83.202 LEFT OVARIAN CYST: Primary | ICD-10-CM

## 2022-01-20 PROCEDURE — 99213 OFFICE O/P EST LOW 20 MIN: CPT | Performed by: OBSTETRICS & GYNECOLOGY

## 2022-01-20 NOTE — PROGRESS NOTES
PROBLEM VISIT     Date of service: 2022    Sam Lake  Is a 80 y.o.  female    PT's PCP is: Radha Oneal MD     : 1940                                             Subjective:       No LMP recorded. Patient is postmenopausal.     OB History    Para Term  AB Living   3 3 3         SAB IAB Ectopic Molar Multiple Live Births                    # Outcome Date GA Lbr Shukri/2nd Weight Sex Delivery Anes PTL Lv   3 Term 10/15/77    F Vag-Spont Spinal     2 Term 70    M Vag-Spont Spinal     1 Term 68    M Vag-Spont           Social History     Tobacco Use   Smoking Status Former Smoker    Packs/day: 1.00    Years: 17.00    Pack years: 17.00    Quit date: 1979    Years since quittin.0   Smokeless Tobacco Never Used        Social History     Substance and Sexual Activity   Alcohol Use No       Social History     Substance and Sexual Activity   Sexual Activity Not on file       Allergies: Latex and Percocet [oxycodone-acetaminophen]    Chief Complaint   Patient presents with    Other     patient present today to discuss the removal of her Left Ovary. Last Yearly:      Last pap: 19    Last HPV: never      NURSE: sukhdeep    PE:  Vital Signs  Blood pressure 124/74, height 5' 3\" (1.6 m), weight 209 lb (94.8 kg), not currently breastfeeding. Labs:    No results found for this visit on 22. NURSE: sadia    HPI: The patient is here and she does not wish to have LSO for a slowly enlarging cystic ovary. She is afraid that this could turn into cancer and I did reassure her to the best of my ability that I do not believe it is. No  PT denies fever, chills, nausea and vomiting       Objective: Ultrasound and tumor markers reviewed with the patient. She also has history of an abdominal wall hernia but this was superior to the umbilicus. Photos and operative notes were reviewed.   Of significance also was the patient is using Coumadin at this time                           Assessment and Plan: I did talk to the patient about performing a laparoscopic left salpingo-oophorectomy. Did review surgical risk of blood loss, very small risk of transfusion, risk of infection, risk of open surgery as well. The patient does have a DNR order as she is at risk is her  being resuscitated and did not want to go through this but she would be happy to sign the waiver for the surgery          Diagnosis Orders   1. Left ovarian cyst               No follow-ups on file. FF: 20 minutes    There are no Patient Instructions on file for this visit. Over 75%of time spent on counseling and care coordination on: see assessment and plan,  She was also counseled on her preventative health maintenance recommendations and follow-up.       Radha Navarrete MD,1/20/2022 4:28 PM

## 2022-01-21 ENCOUNTER — HOSPITAL ENCOUNTER (OUTPATIENT)
Dept: PHARMACY | Age: 82
Setting detail: THERAPIES SERIES
Discharge: HOME | End: 2022-01-21
Payer: MEDICARE

## 2022-01-21 VITALS
DIASTOLIC BLOOD PRESSURE: 68 MMHG | BODY MASS INDEX: 36.85 KG/M2 | WEIGHT: 208 LBS | SYSTOLIC BLOOD PRESSURE: 140 MMHG | HEART RATE: 94 BPM

## 2022-01-21 DIAGNOSIS — Z79.01 LONG TERM CURRENT USE OF ANTICOAGULANT THERAPY: Primary | ICD-10-CM

## 2022-01-21 LAB — INR BLD: 1.2

## 2022-01-21 PROCEDURE — 85610 PROTHROMBIN TIME: CPT

## 2022-01-21 PROCEDURE — 99212 OFFICE O/P EST SF 10 MIN: CPT

## 2022-01-21 NOTE — PATIENT INSTRUCTIONS
Please take warfarin 15 mg (2 tablets) today. Resume warfarin 3.75 mg every Wednesday and 7.5 mg all other days. Return clinic in 2 weeks. Continue to monitor urine and stool for signs and symptoms of bleeding. Please notify the clinic of any medication changes.

## 2022-01-21 NOTE — PROGRESS NOTES
[facility] [address] [facility]/[place]  Medication Management  ANTICOAGULATION    Referring Provider: Baljit Raza INR: 2-3    TODAY'S INR: 1.2    WARFARIN Dosage: Warfarin 3.75 mg po every Wednesday; 7.5 mg po all other days  Patient will take warfarin 15 mg po today for 1 dose    INR (no units)   Date Value   01/21/2022 1.2   12/13/2021 3.9   11/18/2021 4.1   10/21/2021 4.1   10/01/2021 1.9   09/23/2021 2.1   08/26/2021 3.1       Medication changes:  No changes  Notes:    Fingerstick INR drawn per clinic protocol. Patient states no visible blood in urine and no black tarry stool. Denies any missed doses of warfarin. No change in other maintenance medications or in diet. Will recheck INR in 2 weeks. Patient acknowledges working in consult agreement with pharmacist as referred by his/her physician. Patient is just returning from a vacation in [place]. Warfarin dose was decreased during vacation as patient expected to have diet changes and more cocktails than normal.   Patient reports, however, that she did not consume as many cocktails as she thought she would. Will resume previous stable warfarin regimen of 3.75 mg po every Wednesday and 7.5 mg po all other days. Patient will take warfarin 15 mg po today for 1 dose. For Pharmacy Admin Tracking Only     Intervention Detail: Dose Adjustment: 2, reason: Therapy Optimization   Total # of Interventions Recommended: 2   Total # of Interventions Accepted: 2   Time Spent (min): SARATH Geiger.  Albert Krause, [facility]

## 2022-01-26 DIAGNOSIS — Z01.818 PRE-OP TESTING: Primary | ICD-10-CM

## 2022-01-31 ENCOUNTER — HOSPITAL ENCOUNTER (OUTPATIENT)
Age: 82
Discharge: HOME | End: 2022-01-31
Payer: MEDICARE

## 2022-01-31 DIAGNOSIS — N83.202 LEFT OVARIAN CYST: Primary | ICD-10-CM

## 2022-01-31 DIAGNOSIS — R97.8 OTHER ABNORMAL TUMOR MARKERS: ICD-10-CM

## 2022-01-31 DIAGNOSIS — N83.202 LEFT OVARIAN CYST: ICD-10-CM

## 2022-01-31 DIAGNOSIS — R97.0 ELEVATED CARCINOEMBRYONIC ANTIGEN (CEA): ICD-10-CM

## 2022-01-31 LAB
CA 125: 75 U/ML
CARCINOEMBRYONIC ANTIGEN: 4 NG/ML

## 2022-01-31 PROCEDURE — 86304 IMMUNOASSAY TUMOR CA 125: CPT

## 2022-01-31 PROCEDURE — 82378 CARCINOEMBRYONIC ANTIGEN: CPT

## 2022-01-31 PROCEDURE — 36415 COLL VENOUS BLD VENIPUNCTURE: CPT

## 2022-02-01 DIAGNOSIS — N83.8 OVARIAN MASS, LEFT: Primary | ICD-10-CM

## 2022-02-07 ENCOUNTER — HOSPITAL ENCOUNTER (OUTPATIENT)
Dept: PHARMACY | Age: 82
Setting detail: THERAPIES SERIES
Discharge: HOME | End: 2022-02-07
Payer: MEDICARE

## 2022-02-07 VITALS
BODY MASS INDEX: 37.02 KG/M2 | HEART RATE: 101 BPM | DIASTOLIC BLOOD PRESSURE: 82 MMHG | SYSTOLIC BLOOD PRESSURE: 157 MMHG | WEIGHT: 209 LBS

## 2022-02-07 DIAGNOSIS — Z79.01 LONG TERM CURRENT USE OF ANTICOAGULANT THERAPY: Primary | ICD-10-CM

## 2022-02-07 LAB — INR BLD: 1.7

## 2022-02-07 PROCEDURE — 99212 OFFICE O/P EST SF 10 MIN: CPT

## 2022-02-07 PROCEDURE — 85610 PROTHROMBIN TIME: CPT

## 2022-02-07 NOTE — PROGRESS NOTES
[facility] [address] [facility]/[place]  Medication Management  ANTICOAGULATION    Referring Provider: Ana Lilia Parker INR: 2-3    TODAY'S INR: 1.7    WARFARIN Dosage: Increase warfarin to 7.5 mg po daily  Patient will take warfarin 11.25 mg po today for 1 dose    INR (no units)   Date Value   02/07/2022 1.7   01/21/2022 1.2   12/13/2021 3.9   11/18/2021 4.1   10/21/2021 4.1   10/01/2021 1.9   09/23/2021 2.1       Medication changes:  No changes  Notes:    Fingerstick INR drawn per clinic protocol. Patient states no visible blood in urine and no black tarry stool. Denies any missed doses of warfarin. No change in other maintenance medications or in diet. Will recheck INR in 3 weeks. Patient acknowledges working in consult agreement with pharmacist as referred by his/her physician. For Pharmacy Admin Tracking Only     Intervention Detail: Dose Adjustment: 2, reason: Therapy Optimization   Total # of Interventions Recommended: 2   Total # of Interventions Accepted: 2   Time Spent (min): [address] [facility] [address] [facility] [address].  Kalli Marsh [facility]

## 2022-02-07 NOTE — PATIENT INSTRUCTIONS
Please take warfarin 11.25 mg today. Increase dose of warfarin as instructed on dosing calendar provided - 7.5 mg daily. Return to clinic in 3 weeks. Continue to monitor urine and stool for signs and symptoms of bleeding. Please notify the clinic of any medication changes.

## 2022-02-10 ENCOUNTER — OFFICE VISIT (OUTPATIENT)
Dept: GYNECOLOGIC ONCOLOGY | Age: 82
End: 2022-02-10
Payer: MEDICARE

## 2022-02-10 VITALS
TEMPERATURE: 96.6 F | HEART RATE: 90 BPM | WEIGHT: 209.4 LBS | BODY MASS INDEX: 37.09 KG/M2 | OXYGEN SATURATION: 93 % | SYSTOLIC BLOOD PRESSURE: 192 MMHG | DIASTOLIC BLOOD PRESSURE: 105 MMHG

## 2022-02-10 DIAGNOSIS — E66.01 SEVERE OBESITY (BMI 35.0-39.9) WITH COMORBIDITY (HCC): ICD-10-CM

## 2022-02-10 DIAGNOSIS — R19.00 PELVIC MASS: Primary | ICD-10-CM

## 2022-02-10 DIAGNOSIS — R97.1 ELEVATED CANCER ANTIGEN 125 (CA 125): ICD-10-CM

## 2022-02-10 PROCEDURE — 99205 OFFICE O/P NEW HI 60 MIN: CPT | Performed by: OBSTETRICS & GYNECOLOGY

## 2022-02-10 ASSESSMENT — ENCOUNTER SYMPTOMS
COUGH: 1
BACK PAIN: 1

## 2022-02-10 NOTE — PROGRESS NOTES
Kuldeep Elias is a 80 y.o. female that presents today for:  Chief Complaint   Patient presents with    New Patient     Ovarian Mass         HPI:    80 y.o.  woman, seen in consultation from Dr Krystian Minor in [facility] for an elevated  level and an asymptomatic left adnexal mass. She tells me that she has known about this cyst for about one year. It has been stable. She denies any GI or  sympmtoms. She last had imaging in 2021. Her recent  level had increased to 75. She gives a history of diverticulitis. She is obese and has a history of knee replacement complicated by a post op MRSA joint infection that required three months of IV antibiotics. She is doing well overall today. I revd her notes, records, history, imaging in detail today. I ordered an MRI pelvis for further becerra of this pelvic mass. She had a RSO fifty years ago for a \"ruptured ovarian cyst.\"  Her mother  from ovary cancer in her [de-identified]. ROS:  I have personally reviewed and agree with the review of systems done by my ancillary staff in the [facility] documentation.         Past Medical History:   Diagnosis Date    Adenomatous colon polyp 2015    tubular adenoma    Anxiety     Depression     Diverticulitis 2009    Epidural abscess 2017    with diskitis / osteomyelitis s/p IV Abx course and L2-3 laminectomy    Factor V Leiden ([address]     GERD (gastroesophageal reflux disease)     History of blood transfusion     no reaction    History of DVT (deep vein thrombosis)     DVT right leg    Hyperlipidemia     Impaired fasting glucose     Kidney stones     MRSA bacteremia 2017       Past Surgical History:   Procedure Laterality Date    APPENDECTOMY  1978    CATARACT REMOVAL Bilateral     Dr. Nelson Albert      Dr. Justine Cantu - no polyps, pan-diverticulosis    COLONOSCOPY  2015    -polyp (tubular adenoma),diverticulosis,hemorrhoids    LUMBAR LAMINECTOMY  10/04/2017    [facility] - L2-3 and partial L4, due to diskitis / ostemyelitis of the lumbar spine    OVARIAN CYST REMOVAL      SD INCISE FINGER TENDON SHEATH Right 2018    Dr. Brandon Carrillo - trigger finger release right index, middle and ring fingers    TONSILLECTOMY  1950    TOTAL KNEE ARTHROPLASTY Right 2017    Dr. Ruben Foley in [address] [place] [address] [place]  2016    Dr. Prince Costello - robot assisted laproscopic ventral hernia repair with mesh       Family History   Problem Relation Age of Onset    Colon Cancer Father 48    Other Father         gastric ulcers    Ovarian Cancer Mother 80    Cancer Mother     Colon Cancer Brother 79    Other Brother 54        viral endocarditis -  at 54 on heart transplant list    Deep Vein Thrombosis Brother         factor V Leiden - on lifelong warfarin    Other Brother 7        Mitochondrial myopathy and Chronic Progressive External Ophthalmoplegia dx'd at age 9 (he turned 79 in 2018)    Other Brother         AAA       Social History:   No history of drug, alcohol, or tobacco abuse. Current Outpatient Medications   Medication Sig Dispense Refill    nystatin (MYCOSTATIN) 052688 UNIT/GM powder Apply 3 times daily as needed for rash. 1 each 5    ALPRAZolam (XANAX) 0.25 MG tablet Take 1 tablet by mouth nightly as needed for Sleep or Anxiety for up to 180 days.  30 tablet 0    sertraline (ZOLOFT) 50 MG tablet Take 1 tablet by mouth daily Along with 100 mg for total daily dose 150 mg QD 90 tablet 3    sertraline (ZOLOFT) 100 MG tablet Take 1 tablet by mouth daily (take along with 50 mg tablet for total daily dose of 150 mg daily) 90 tablet 3    ezetimibe (ZETIA) 10 MG tablet Take 1 tablet by mouth daily 30 tablet 5    warfarin (COUMADIN) 7.5 MG tablet Take 1 tablet by mouth daily Coumadin Clinic:  7.5 daily 90 tablet 3    Cholecalciferol (VITAMIN D3) 1.25 MG (14364 UT) CAPS Take 1 capsule by mouth daily       vitamin B-12 (CYANOCOBALAMIN) 100 MCG tablet Take 100 mcg by mouth daily       acetaminophen (TYLENOL) 500 MG tablet Take 500 mg by mouth every 4 hours as needed       Coenzyme Q10 (COQ-10) 100 MG CAPS Take 100 mg by mouth daily        No current facility-administered medications for this visit. Allergies   Allergen Reactions    Latex Rash and Other (See Comments)     redness    Percocet [Oxycodone-Acetaminophen] Itching       Physical Examination:  Vitals:    02/10/22 1003   BP: (!) 158/92   Pulse: 90   Temp: 96.6 °F (35.9 °C)   TempSrc: Temporal   SpO2: 93%   Weight: 209 lb 6.4 oz (95 kg)       General: well- appearing, no acute distress, alert and oriented x 3    HEENT: Thyroid normal size. No cervical or supraclavicular lymphadenopathy. Lungs: Clear to auscultate bilaterally to the bases    No CVA tenderness present bilaterally. Heart: Auscultation reveals regular rate and rhythm. No murmurs or gallops appreciated. Abdomen: Obese and non tender. No detectable organomegaly. No palpable masses or ascites. No inguinal lymphadenopathy bilaterally. Extremities: No edema, calf tenderness or deformities bilaterally    Pelvic Examination:   Vulva and vagina are normal.  Urethra is normal.  Cervix is normal.   Uterus is normal.  Six cm left adnexal mass, soft and nontender. Right adnexa absent. Recta exam confirms. Assessment/Plan:    80 y.o.  woman with elevated  level and an asymptomatic left adnexal cyst.  Mother  from ovary cancer in her [de-identified]. Patient has no pain or ovary cancer symptoms today. Patient has a history of diverticulitis. I reviewed her notes, history, records, relevant imaging and relevant pathology today in great detail. She tells me that all of her questions have been answered to her satisfaction today. She wants to avoid surgery for now. She has a Factor V leiden mutation and history of DVT. She is on chronic warfarin therapy.   We will call her with her MRI results and develop a definitive plan of care and follow up for her in the weeks ahead. She tells me that she fully understands the situation, options, risks that we discussed today in detail. She will have a fu appt with me in about 4 months for surveillance unless a surgical plan is developed for her in the meantime. ICD-10-CM    1. Pelvic mass  R19.00 MRI PELVIS W WO CONTRAST   2. Elevated cancer antigen 125 ()  R97.1 MRI PELVIS W WO CONTRAST   3. Severe obesity (BMI 35.0-39. 9) with comorbidity [facility])  E66.01          Electronically signed by Nanette Castro MD on 2/10/2022 at 10:39 AM

## 2022-02-23 ENCOUNTER — OFFICE VISIT (OUTPATIENT)
Dept: OBGYN | Age: 82
End: 2022-02-23
Payer: MEDICARE

## 2022-02-23 VITALS
SYSTOLIC BLOOD PRESSURE: 124 MMHG | DIASTOLIC BLOOD PRESSURE: 74 MMHG | BODY MASS INDEX: 37.03 KG/M2 | HEIGHT: 63 IN | WEIGHT: 209 LBS

## 2022-02-23 DIAGNOSIS — L30.4 INTERTRIGO: Primary | ICD-10-CM

## 2022-02-23 PROCEDURE — 99212 OFFICE O/P EST SF 10 MIN: CPT | Performed by: OBSTETRICS & GYNECOLOGY

## 2022-02-23 RX ORDER — CLOTRIMAZOLE AND BETAMETHASONE DIPROPIONATE 10; .64 MG/G; MG/G
CREAM TOPICAL
Qty: 45 G | Refills: 1 | Status: SHIPPED | OUTPATIENT
Start: 2022-02-23 | End: 2022-09-19

## 2022-02-23 NOTE — PROGRESS NOTES
PROBLEM VISIT     Date of service: 2022    Marci Martínez  Is a 80 y.o.  female    PT's PCP is: Tray Valverde MD     : 1940                                             Subjective:       No LMP recorded. Patient is postmenopausal.     OB History    Para Term  AB Living   3 3 3         SAB IAB Ectopic Molar Multiple Live Births                    # Outcome Date GA Lbr Shukri/2nd Weight Sex Delivery Anes PTL Lv   3 Term 10/15/77    F Vag-Spont Spinal     2 Term 70    M Vag-Spont Spinal     1 Term 68    M Vag-Spont           Social History     Tobacco Use   Smoking Status Former Smoker    Packs/day: 1.00    Years: 17.00    Pack years: 17.00    Quit date: 1979    Years since quittin.1   Smokeless Tobacco Never Used        Social History     Substance and Sexual Activity   Alcohol Use No       Social History     Substance and Sexual Activity   Sexual Activity Not Currently       Allergies: Latex and Percocet [oxycodone-acetaminophen]    Chief Complaint   Patient presents with    Other     pt would like to discuss her paperwork for getting her refund from her cruise    Other     Pt saw on her mychart that she was diagnosed with intertigo in Oct - it has not cleared up and she is worried about it not being cleared up before her surgery - the powder is not helping. Last Yearly:      Last pap: 19    Last HPV: never      NURSE: sukhdeep    PE:  Vital Signs  Blood pressure 124/74, height 5' 3\" (1.6 m), weight 209 lb (94.8 kg), not currently breastfeeding. Labs:    No results found for this visit on 22. NURSE: sadia    HPI: The patient is here today for additional treatment for intertrigo. The Mycostatin powder has helped but it has not eliminated the rash. She may be facing surgery in the near future and wishes to have this cleared at that time. Also wishes assistance in scheduling her MRI.   This was ordered by Dr. Carl Castanon for an enlarging left ovary and elevated CA-125. I also did review and sign appropriate paperwork for her to be refunded from the missed cruise because of the's medical situation    No  PT denies fever, chills, nausea and vomiting       Objective: Intertrigo noted.  notes reviewed                           Assessment and Plan: We will give a trial of Lotrisone cream to treat the intertrigo. Will assist with scheduling MRI          Diagnosis Orders   1. Intertrigo  clotrimazole-betamethasone (LOTRISONE) 1-0.05 % cream             No follow-ups on file. FF: 10 minutes    There are no Patient Instructions on file for this visit. Over 75%of time spent on counseling and care coordination on: see assessment and plan,  She was also counseled on her preventative health maintenance recommendations and follow-up.       Benjamin Roque MD,2/23/2022 4:38 PM

## 2022-02-28 ENCOUNTER — HOSPITAL ENCOUNTER (OUTPATIENT)
Dept: PHARMACY | Age: 82
Setting detail: THERAPIES SERIES
Discharge: HOME | End: 2022-02-28
Payer: MEDICARE

## 2022-02-28 VITALS
DIASTOLIC BLOOD PRESSURE: 72 MMHG | WEIGHT: 211 LBS | HEART RATE: 84 BPM | BODY MASS INDEX: 37.38 KG/M2 | SYSTOLIC BLOOD PRESSURE: 148 MMHG

## 2022-02-28 DIAGNOSIS — Z79.01 LONG TERM CURRENT USE OF ANTICOAGULANT THERAPY: Primary | ICD-10-CM

## 2022-02-28 LAB — INR BLD: 2.1

## 2022-02-28 PROCEDURE — 99211 OFF/OP EST MAY X REQ PHY/QHP: CPT

## 2022-02-28 PROCEDURE — 85610 PROTHROMBIN TIME: CPT

## 2022-02-28 RX ORDER — CYCLOSPORINE 0.5 MG/ML
EMULSION OPHTHALMIC DAILY
COMMUNITY
Start: 2022-02-23

## 2022-02-28 NOTE — PROGRESS NOTES
[place]  Medication Management  ANTICOAGULATION    Referring Provider: Aquiles Conti     GOAL INR: 2-3     TODAY'S INR: 2.1     WARFARIN Dosage: Continue warfarin 7.5 mg po daily. INR (no units)   Date Value   2022 1.7   2022 1.2   2021 3.9   2021 4.1   10/21/2021 4.1   10/01/2021 1.9   2021 2.1       Medication changes:  Started Restasis eye drops  Started Lotrisone    Notes:    Fingerstick INR drawn per clinic protocol. Patient states no visible blood in urine and no black tarry stool. Denies any missed doses of warfarin. No change in other maintenance medications or in diet. Will recheck INR in 3 weeks. Patient acknowledges working in consult agreement with pharmacist as referred by his/her physician. Patient will be back on the [place] on 3-17 then taking a trip to [facility] and [place] on 3-24 for a couple weeks.                   For Pharmacy Admin Tracking Only     Intervention Detail: Adherence Monitorin and New Rx: 1, reason: Needs Additional Therapy   Total # of Interventions Recommended: 1   Total # of Interventions Accepted: 1   Time Spent (min):  Ca Fajardo, [facility], PharmD

## 2022-02-28 NOTE — PATIENT INSTRUCTIONS
Continue to monitor urine and stool. Continue to monitor for signs of bleeding. Return to clinic in 3 weeks.   Continue warfarin 7.5 mg po daily

## 2022-03-03 ENCOUNTER — TELEPHONE (OUTPATIENT)
Dept: GYNECOLOGIC ONCOLOGY | Age: 82
End: 2022-03-03

## 2022-03-03 DIAGNOSIS — R19.00 PELVIC MASS: Primary | ICD-10-CM

## 2022-03-03 NOTE — TELEPHONE ENCOUNTER
Trudi Zuniga from [facility] radiology called stating pt is scheduled for MRI pelvis tomorrow 3.4.22 @ 8:45am. Trudi Zuniga states that because pt is over the age of 61, pt needs to have BUN and creatin labs completed before MRI, this can be done day of MRI. Please place orders for Bun and creatin labs. Any questions CB# [phone].

## 2022-03-04 ENCOUNTER — HOSPITAL ENCOUNTER (OUTPATIENT)
Age: 82
Discharge: HOME | End: 2022-03-04
Payer: MEDICARE

## 2022-03-04 ENCOUNTER — HOSPITAL ENCOUNTER (OUTPATIENT)
Dept: MRI IMAGING | Age: 82
Discharge: HOME | End: 2022-03-06
Payer: MEDICARE

## 2022-03-04 DIAGNOSIS — R19.00 PELVIC MASS: ICD-10-CM

## 2022-03-04 DIAGNOSIS — R97.1 ELEVATED CANCER ANTIGEN 125 (CA 125): ICD-10-CM

## 2022-03-04 LAB
BUN BLDV-MCNC: 18 MG/DL (ref 8–23)
CREAT SERPL-MCNC: 0.83 MG/DL (ref 0.5–0.9)
GFR AFRICAN AMERICAN: >60 ML/MIN
GFR NON-AFRICAN AMERICAN: >60 ML/MIN
GFR SERPL CREATININE-BSD FRML MDRD: NORMAL ML/MIN/{1.73_M2}
GFR SERPL CREATININE-BSD FRML MDRD: NORMAL ML/MIN/{1.73_M2}

## 2022-03-04 PROCEDURE — 6360000004 HC RX CONTRAST MEDICATION: Performed by: OBSTETRICS & GYNECOLOGY

## 2022-03-04 PROCEDURE — 36415 COLL VENOUS BLD VENIPUNCTURE: CPT

## 2022-03-04 PROCEDURE — 82565 ASSAY OF CREATININE: CPT

## 2022-03-04 PROCEDURE — A9579 GAD-BASE MR CONTRAST NOS,1ML: HCPCS | Performed by: OBSTETRICS & GYNECOLOGY

## 2022-03-04 PROCEDURE — 84520 ASSAY OF UREA NITROGEN: CPT

## 2022-03-04 PROCEDURE — 72197 MRI PELVIS W/O & W/DYE: CPT

## 2022-03-04 RX ADMIN — GADOTERIDOL 19 ML: 279.3 INJECTION, SOLUTION INTRAVENOUS at 09:48

## 2022-03-09 DIAGNOSIS — R97.8 ELEVATED TUMOR MARKERS: ICD-10-CM

## 2022-03-09 DIAGNOSIS — C56.9 MALIGNANT NEOPLASM OF OVARY, UNSPECIFIED LATERALITY (HCC): ICD-10-CM

## 2022-03-09 DIAGNOSIS — R19.00 PELVIC MASS: Primary | ICD-10-CM

## 2022-04-11 ENCOUNTER — HOSPITAL ENCOUNTER (OUTPATIENT)
Dept: PHARMACY | Age: 82
Setting detail: THERAPIES SERIES
Discharge: HOME | End: 2022-04-11
Payer: MEDICARE

## 2022-04-11 ENCOUNTER — HOSPITAL ENCOUNTER (OUTPATIENT)
Dept: CT IMAGING | Age: 82
Discharge: HOME | End: 2022-04-13
Payer: MEDICARE

## 2022-04-11 VITALS
DIASTOLIC BLOOD PRESSURE: 74 MMHG | WEIGHT: 212 LBS | SYSTOLIC BLOOD PRESSURE: 143 MMHG | BODY MASS INDEX: 37.55 KG/M2 | HEART RATE: 75 BPM

## 2022-04-11 DIAGNOSIS — C56.9 MALIGNANT NEOPLASM OF OVARY, UNSPECIFIED LATERALITY (HCC): ICD-10-CM

## 2022-04-11 DIAGNOSIS — R97.8 ELEVATED TUMOR MARKERS: ICD-10-CM

## 2022-04-11 DIAGNOSIS — R19.00 PELVIC MASS: ICD-10-CM

## 2022-04-11 DIAGNOSIS — Z79.01 LONG TERM CURRENT USE OF ANTICOAGULANT THERAPY: Primary | ICD-10-CM

## 2022-04-11 LAB — INR BLD: 1.6

## 2022-04-11 PROCEDURE — 85610 PROTHROMBIN TIME: CPT

## 2022-04-11 PROCEDURE — 74177 CT ABD & PELVIS W/CONTRAST: CPT

## 2022-04-11 PROCEDURE — 99212 OFFICE O/P EST SF 10 MIN: CPT

## 2022-04-11 PROCEDURE — 6360000004 HC RX CONTRAST MEDICATION: Performed by: OBSTETRICS & GYNECOLOGY

## 2022-04-11 RX ADMIN — IOPAMIDOL 18 ML: 755 INJECTION, SOLUTION INTRAVENOUS at 09:30

## 2022-04-11 RX ADMIN — IOPAMIDOL 75 ML: 755 INJECTION, SOLUTION INTRAVENOUS at 10:34

## 2022-04-11 NOTE — PROGRESS NOTES
[facility] [address] [facility]/[place]  Medication Management  ANTICOAGULATION    Referring Provider: Syl     GOAL INR: 2-3     TODAY'S INR: 1.6     WARFARIN Dosage: Take warfarin 2 tablets for 15 mg then continue warfarin 7.5 mg po daily. INR (no units)   Date Value   2022 2.1   2022 1.7   2022 1.2   2021 3.9   2021 4.1   10/21/2021 4.1   10/01/2021 1.9       Medication changes:  None    Notes:    Fingerstick INR drawn per clinic protocol. Patient states no visible blood in urine and no black tarry stool. Denies any missed doses of warfarin. No change in other maintenance medications or in diet. Will recheck INR in 3 weeks. (Patient will be in [facility]) Patient acknowledges working in consult agreement with pharmacist as referred by his/her physician. Patient missed some doses while on vacation and didn't take warfarin last night on purpose because she \"had a scratch and it bled a lot so she thought her INR was high. \"   Patient hurt her right hip and is in a lot of pain.               For Pharmacy Admin Tracking Only     Intervention Detail: Adherence Monitorin and Dose Adjustment: 1, reason: Therapy Optimization   Total # of Interventions Recommended: 1   Total # of Interventions Accepted: 1   Time Spent (min):  Ca Fajardo [facility], PharmD

## 2022-04-11 NOTE — PATIENT INSTRUCTIONS
Continue to monitor urine and stool. Continue to monitor for signs of bleeding. Return to clinic in 3 weeks. Continue warfarin 7.5 mg po daily. Cimetidine Pregnancy And Lactation Text: This medication is Pregnancy Category B and is considered safe during pregnancy. It is also excreted in breast milk and breast feeding isn't recommended.

## 2022-04-13 ENCOUNTER — TELEPHONE (OUTPATIENT)
Dept: GYNECOLOGIC ONCOLOGY | Age: 82
End: 2022-04-13

## 2022-04-13 DIAGNOSIS — R91.8 PULMONARY NODULES: Primary | ICD-10-CM

## 2022-04-13 DIAGNOSIS — M89.9 BONE LESION: ICD-10-CM

## 2022-05-06 ENCOUNTER — HOSPITAL ENCOUNTER (OUTPATIENT)
Dept: PHARMACY | Age: 82
Setting detail: THERAPIES SERIES
Discharge: HOME | End: 2022-05-06
Payer: MEDICARE

## 2022-05-06 ENCOUNTER — HOSPITAL ENCOUNTER (OUTPATIENT)
Dept: NUCLEAR MEDICINE | Age: 82
Discharge: HOME | End: 2022-05-08
Payer: MEDICARE

## 2022-05-06 VITALS — BODY MASS INDEX: 37.2 KG/M2 | WEIGHT: 210 LBS

## 2022-05-06 DIAGNOSIS — M89.9 BONE LESION: ICD-10-CM

## 2022-05-06 DIAGNOSIS — Z79.01 LONG TERM CURRENT USE OF ANTICOAGULANT THERAPY: Primary | ICD-10-CM

## 2022-05-06 LAB — INR BLD: 1.9

## 2022-05-06 PROCEDURE — 99211 OFF/OP EST MAY X REQ PHY/QHP: CPT

## 2022-05-06 PROCEDURE — 85610 PROTHROMBIN TIME: CPT

## 2022-05-06 PROCEDURE — A9503 TC99M MEDRONATE: HCPCS | Performed by: PHYSICIAN ASSISTANT

## 2022-05-06 PROCEDURE — 3430000000 HC RX DIAGNOSTIC RADIOPHARMACEUTICAL: Performed by: PHYSICIAN ASSISTANT

## 2022-05-06 PROCEDURE — 78306 BONE IMAGING WHOLE BODY: CPT

## 2022-05-06 RX ORDER — TC 99M MEDRONATE 20 MG/10ML
25 INJECTION, POWDER, LYOPHILIZED, FOR SOLUTION INTRAVENOUS
Status: COMPLETED | OUTPATIENT
Start: 2022-05-06 | End: 2022-05-06

## 2022-05-06 RX ADMIN — TC 99M MEDRONATE 25 MILLICURIE: 20 INJECTION, POWDER, LYOPHILIZED, FOR SOLUTION INTRAVENOUS at 10:44

## 2022-05-06 NOTE — PATIENT INSTRUCTIONS
Continue to monitor urine and stool. Continue to monitor for signs of bleeding. Return to clinic in 5 weeks. Take warfarin 1.5 tablets for 11.25 mg then continue warfarin 7.5 mg po daily.

## 2022-05-06 NOTE — PROGRESS NOTES
[place] [address] [place] [address]/[place]  Medication Management  ANTICOAGULATION      Referring Provider: Syl     GOAL INR: 2-3     TODAY'S INR: 1.9     WARFARIN Dosage: Take warfarin 1.5 tablets for 11.25 mg then continue warfarin 7.5 mg po daily. INR (no units)   Date Value   2022 1.6   2022 2.1   2022 1.7   2022 1.2   2021 3.9   2021 4.1   10/21/2021 4.1     Medication changes:  None    Notes:    Fingerstick INR drawn per clinic protocol. Patient states no visible blood in urine and no black tarry stool. Denies any missed doses of warfarin. No change in other maintenance medications or in diet. Will recheck INR in 5 weeks. Patient acknowledges working in consult agreement with pharmacist as referred by his/her physician.                   For Pharmacy Admin Tracking Only     Intervention Detail: Adherence Monitorin and Dose Adjustment: 1, reason: Therapy Optimization   Total # of Interventions Recommended: 1   Total # of Interventions Accepted: 1   Time Spent (min):  Ca Fajardo [facility], PharmD

## 2022-05-27 ENCOUNTER — HOSPITAL ENCOUNTER (OUTPATIENT)
Age: 82
Discharge: HOME | End: 2022-05-27
Payer: MEDICARE

## 2022-05-27 DIAGNOSIS — R19.00 PELVIC MASS: ICD-10-CM

## 2022-05-27 DIAGNOSIS — E78.2 MIXED HYPERLIPIDEMIA: ICD-10-CM

## 2022-05-27 DIAGNOSIS — R73.01 IMPAIRED FASTING GLUCOSE: ICD-10-CM

## 2022-05-27 DIAGNOSIS — N83.209 CYST OF OVARY, UNSPECIFIED LATERALITY: ICD-10-CM

## 2022-05-27 DIAGNOSIS — R97.0 ELEVATED CEA: ICD-10-CM

## 2022-05-27 LAB
ABSOLUTE EOS #: 0.27 K/UL (ref 0–0.44)
ABSOLUTE IMMATURE GRANULOCYTE: 0.08 K/UL (ref 0–0.3)
ABSOLUTE LYMPH #: 0.8 K/UL (ref 1.1–3.7)
ABSOLUTE MONO #: 0.51 K/UL (ref 0.1–1.2)
ALBUMIN SERPL-MCNC: 4.2 G/DL (ref 3.5–5.2)
ALBUMIN/GLOBULIN RATIO: 1.4 (ref 1–2.5)
ALP BLD-CCNC: 95 U/L (ref 35–104)
ALT SERPL-CCNC: 14 U/L (ref 5–33)
ANION GAP SERPL CALCULATED.3IONS-SCNC: 12 MMOL/L (ref 9–17)
AST SERPL-CCNC: 13 U/L
BASOPHILS # BLD: 1 % (ref 0–2)
BASOPHILS ABSOLUTE: 0.09 K/UL (ref 0–0.2)
BILIRUB SERPL-MCNC: 0.33 MG/DL (ref 0.3–1.2)
BUN BLDV-MCNC: 22 MG/DL (ref 8–23)
BUN/CREAT BLD: 24 (ref 9–20)
CA 125: 63 U/ML
CALCIUM SERPL-MCNC: 9.4 MG/DL (ref 8.6–10.4)
CARCINOEMBRYONIC ANTIGEN: 4.5 NG/ML
CHLORIDE BLD-SCNC: 105 MMOL/L (ref 98–107)
CHOLESTEROL/HDL RATIO: 4.3
CHOLESTEROL: 233 MG/DL
CO2: 23 MMOL/L (ref 20–31)
CREAT SERPL-MCNC: 0.91 MG/DL (ref 0.5–0.9)
EOSINOPHILS RELATIVE PERCENT: 4 % (ref 1–4)
ESTIMATED AVERAGE GLUCOSE: 143 MG/DL
GFR AFRICAN AMERICAN: >60 ML/MIN
GFR NON-AFRICAN AMERICAN: 59 ML/MIN
GFR SERPL CREATININE-BSD FRML MDRD: ABNORMAL ML/MIN/{1.73_M2}
GFR SERPL CREATININE-BSD FRML MDRD: ABNORMAL ML/MIN/{1.73_M2}
GLUCOSE BLD-MCNC: 134 MG/DL (ref 70–99)
HBA1C MFR BLD: 6.6 % (ref 4–6)
HCT VFR BLD CALC: 45.8 % (ref 36.3–47.1)
HDLC SERPL-MCNC: 54 MG/DL
HEMOGLOBIN: 14.1 G/DL (ref 11.9–15.1)
IMMATURE GRANULOCYTES: 1 %
LDL CHOLESTEROL: 144 MG/DL (ref 0–130)
LYMPHOCYTES # BLD: 13 % (ref 24–43)
MCH RBC QN AUTO: 29.3 PG (ref 25.2–33.5)
MCHC RBC AUTO-ENTMCNC: 30.8 G/DL (ref 28.4–34.8)
MCV RBC AUTO: 95.2 FL (ref 82.6–102.9)
MONOCYTES # BLD: 8 % (ref 3–12)
NRBC AUTOMATED: 0 PER 100 WBC
PDW BLD-RTO: 14.1 % (ref 11.8–14.4)
PLATELET # BLD: 266 K/UL (ref 138–453)
PMV BLD AUTO: 9.3 FL (ref 8.1–13.5)
POTASSIUM SERPL-SCNC: 4.6 MMOL/L (ref 3.7–5.3)
RBC # BLD: 4.81 M/UL (ref 3.95–5.11)
SEG NEUTROPHILS: 73 % (ref 36–65)
SEGMENTED NEUTROPHILS ABSOLUTE COUNT: 4.46 K/UL (ref 1.5–8.1)
SODIUM BLD-SCNC: 140 MMOL/L (ref 135–144)
TOTAL PROTEIN: 7.1 G/DL (ref 6.4–8.3)
TRIGL SERPL-MCNC: 176 MG/DL
WBC # BLD: 6.2 K/UL (ref 3.5–11.3)

## 2022-05-27 PROCEDURE — 85025 COMPLETE CBC W/AUTO DIFF WBC: CPT

## 2022-05-27 PROCEDURE — 36415 COLL VENOUS BLD VENIPUNCTURE: CPT

## 2022-05-27 PROCEDURE — 80061 LIPID PANEL: CPT

## 2022-05-27 PROCEDURE — 80053 COMPREHEN METABOLIC PANEL: CPT

## 2022-05-27 PROCEDURE — 82378 CARCINOEMBRYONIC ANTIGEN: CPT

## 2022-05-27 PROCEDURE — 83036 HEMOGLOBIN GLYCOSYLATED A1C: CPT

## 2022-05-27 PROCEDURE — 86304 IMMUNOASSAY TUMOR CA 125: CPT

## 2022-06-02 ENCOUNTER — APPOINTMENT (OUTPATIENT)
Dept: CT IMAGING | Age: 82
End: 2022-06-02
Payer: MEDICARE

## 2022-06-02 ENCOUNTER — HOSPITAL ENCOUNTER (EMERGENCY)
Age: 82
Discharge: HOME | End: 2022-06-02
Attending: EMERGENCY MEDICINE
Payer: MEDICARE

## 2022-06-02 ENCOUNTER — TELEPHONE (OUTPATIENT)
Dept: GYNECOLOGIC ONCOLOGY | Age: 82
End: 2022-06-02

## 2022-06-02 VITALS
HEIGHT: 63 IN | OXYGEN SATURATION: 94 % | BODY MASS INDEX: 36.86 KG/M2 | SYSTOLIC BLOOD PRESSURE: 137 MMHG | DIASTOLIC BLOOD PRESSURE: 78 MMHG | RESPIRATION RATE: 20 BRPM | HEART RATE: 81 BPM | WEIGHT: 208 LBS | TEMPERATURE: 97.6 F

## 2022-06-02 DIAGNOSIS — K57.32 DIVERTICULITIS OF COLON: Primary | ICD-10-CM

## 2022-06-02 LAB
ABSOLUTE EOS #: 0.24 K/UL (ref 0–0.44)
ABSOLUTE IMMATURE GRANULOCYTE: 0.09 K/UL (ref 0–0.3)
ABSOLUTE LYMPH #: 0.99 K/UL (ref 1.1–3.7)
ABSOLUTE MONO #: 0.68 K/UL (ref 0.1–1.2)
ALBUMIN SERPL-MCNC: 5 G/DL (ref 3.5–5.2)
ALBUMIN/GLOBULIN RATIO: 1.7 (ref 1–2.5)
ALP BLD-CCNC: 108 U/L (ref 35–104)
ALT SERPL-CCNC: 17 U/L (ref 5–33)
ANION GAP SERPL CALCULATED.3IONS-SCNC: 15 MMOL/L (ref 9–17)
AST SERPL-CCNC: 18 U/L
BASOPHILS # BLD: 1 % (ref 0–2)
BASOPHILS ABSOLUTE: 0.08 K/UL (ref 0–0.2)
BILIRUB SERPL-MCNC: 0.36 MG/DL (ref 0.3–1.2)
BUN BLDV-MCNC: 17 MG/DL (ref 8–23)
CALCIUM SERPL-MCNC: 9.9 MG/DL (ref 8.6–10.4)
CHLORIDE BLD-SCNC: 102 MMOL/L (ref 98–107)
CO2: 22 MMOL/L (ref 20–31)
CREAT SERPL-MCNC: 0.78 MG/DL (ref 0.5–0.9)
EOSINOPHILS RELATIVE PERCENT: 2 % (ref 1–4)
GFR AFRICAN AMERICAN: >60 ML/MIN
GFR NON-AFRICAN AMERICAN: >60 ML/MIN
GFR SERPL CREATININE-BSD FRML MDRD: ABNORMAL ML/MIN/{1.73_M2}
GLUCOSE BLD-MCNC: 136 MG/DL (ref 70–99)
HCT VFR BLD CALC: 49.6 % (ref 36.3–47.1)
HEMOGLOBIN: 15.7 G/DL (ref 11.9–15.1)
IMMATURE GRANULOCYTES: 1 %
INR BLD: 1.6
LIPASE: 43 U/L (ref 13–60)
LYMPHOCYTES # BLD: 10 % (ref 24–43)
MCH RBC QN AUTO: 29.5 PG (ref 25.2–33.5)
MCHC RBC AUTO-ENTMCNC: 31.7 G/DL (ref 28.4–34.8)
MCV RBC AUTO: 93.2 FL (ref 82.6–102.9)
MONOCYTES # BLD: 7 % (ref 3–12)
NRBC AUTOMATED: 0 PER 100 WBC
PARTIAL THROMBOPLASTIN TIME: 31.5 SEC (ref 20.5–30.5)
PDW BLD-RTO: 13.9 % (ref 11.8–14.4)
PLATELET # BLD: 279 K/UL (ref 138–453)
PMV BLD AUTO: 9.1 FL (ref 8.1–13.5)
POTASSIUM SERPL-SCNC: 4.4 MMOL/L (ref 3.7–5.3)
PROTHROMBIN TIME: 16 SEC (ref 9.1–12.3)
RBC # BLD: 5.32 M/UL (ref 3.95–5.11)
SEG NEUTROPHILS: 79 % (ref 36–65)
SEGMENTED NEUTROPHILS ABSOLUTE COUNT: 8.32 K/UL (ref 1.5–8.1)
SODIUM BLD-SCNC: 139 MMOL/L (ref 135–144)
TOTAL PROTEIN: 7.9 G/DL (ref 6.4–8.3)
WBC # BLD: 10.4 K/UL (ref 3.5–11.3)

## 2022-06-02 PROCEDURE — 2580000003 HC RX 258: Performed by: STUDENT IN AN ORGANIZED HEALTH CARE EDUCATION/TRAINING PROGRAM

## 2022-06-02 PROCEDURE — 6360000002 HC RX W HCPCS: Performed by: STUDENT IN AN ORGANIZED HEALTH CARE EDUCATION/TRAINING PROGRAM

## 2022-06-02 PROCEDURE — 85730 THROMBOPLASTIN TIME PARTIAL: CPT

## 2022-06-02 PROCEDURE — 85610 PROTHROMBIN TIME: CPT

## 2022-06-02 PROCEDURE — 96375 TX/PRO/DX INJ NEW DRUG ADDON: CPT

## 2022-06-02 PROCEDURE — 74177 CT ABD & PELVIS W/CONTRAST: CPT

## 2022-06-02 PROCEDURE — 6360000004 HC RX CONTRAST MEDICATION: Performed by: STUDENT IN AN ORGANIZED HEALTH CARE EDUCATION/TRAINING PROGRAM

## 2022-06-02 PROCEDURE — 83690 ASSAY OF LIPASE: CPT

## 2022-06-02 PROCEDURE — 96374 THER/PROPH/DIAG INJ IV PUSH: CPT

## 2022-06-02 PROCEDURE — 80053 COMPREHEN METABOLIC PANEL: CPT

## 2022-06-02 PROCEDURE — 85025 COMPLETE CBC W/AUTO DIFF WBC: CPT

## 2022-06-02 PROCEDURE — 99285 EMERGENCY DEPT VISIT HI MDM: CPT

## 2022-06-02 RX ORDER — DICYCLOMINE HYDROCHLORIDE 10 MG/1
10 CAPSULE ORAL EVERY 6 HOURS PRN
Qty: 20 CAPSULE | Refills: 0 | Status: SHIPPED | OUTPATIENT
Start: 2022-06-02 | End: 2022-10-13 | Stop reason: ALTCHOICE

## 2022-06-02 RX ORDER — ONDANSETRON 2 MG/ML
4 INJECTION INTRAMUSCULAR; INTRAVENOUS ONCE
Status: COMPLETED | OUTPATIENT
Start: 2022-06-02 | End: 2022-06-02

## 2022-06-02 RX ORDER — FENTANYL CITRATE 50 UG/ML
50 INJECTION, SOLUTION INTRAMUSCULAR; INTRAVENOUS ONCE
Status: COMPLETED | OUTPATIENT
Start: 2022-06-02 | End: 2022-06-02

## 2022-06-02 RX ORDER — CIPROFLOXACIN 500 MG/1
500 TABLET, FILM COATED ORAL 2 TIMES DAILY
Qty: 14 TABLET | Refills: 0 | Status: SHIPPED | OUTPATIENT
Start: 2022-06-02 | End: 2022-06-09

## 2022-06-02 RX ORDER — 0.9 % SODIUM CHLORIDE 0.9 %
1000 INTRAVENOUS SOLUTION INTRAVENOUS ONCE
Status: COMPLETED | OUTPATIENT
Start: 2022-06-02 | End: 2022-06-02

## 2022-06-02 RX ORDER — ONDANSETRON 4 MG/1
4 TABLET, ORALLY DISINTEGRATING ORAL EVERY 8 HOURS PRN
Qty: 20 TABLET | Refills: 0 | Status: SHIPPED | OUTPATIENT
Start: 2022-06-02

## 2022-06-02 RX ORDER — METRONIDAZOLE 500 MG/1
500 TABLET ORAL 2 TIMES DAILY
Qty: 14 TABLET | Refills: 0 | Status: SHIPPED | OUTPATIENT
Start: 2022-06-02 | End: 2022-06-09

## 2022-06-02 RX ADMIN — ONDANSETRON 4 MG: 2 INJECTION INTRAMUSCULAR; INTRAVENOUS at 09:02

## 2022-06-02 RX ADMIN — IOPAMIDOL 75 ML: 755 INJECTION, SOLUTION INTRAVENOUS at 09:49

## 2022-06-02 RX ADMIN — SODIUM CHLORIDE 1000 ML: 9 INJECTION, SOLUTION INTRAVENOUS at 09:04

## 2022-06-02 RX ADMIN — FENTANYL CITRATE 50 MCG: 50 INJECTION, SOLUTION INTRAMUSCULAR; INTRAVENOUS at 09:02

## 2022-06-02 ASSESSMENT — ENCOUNTER SYMPTOMS
NAUSEA: 1
WHEEZING: 0
SHORTNESS OF BREATH: 0
DIARRHEA: 0
VOMITING: 0
BLOOD IN STOOL: 0
COUGH: 0
ABDOMINAL PAIN: 1

## 2022-06-02 ASSESSMENT — PAIN SCALES - GENERAL: PAINLEVEL_OUTOF10: 0

## 2022-06-02 ASSESSMENT — PAIN - FUNCTIONAL ASSESSMENT: PAIN_FUNCTIONAL_ASSESSMENT: NONE - DENIES PAIN

## 2022-06-02 NOTE — ED NOTES
Pt arrived to ED 08 via triage. Pt co Lt lower pelvic pain that started last night. Pt states that she has an ovarian cyst on the Lt side that her OB has been watching. Pt states that she has not had any pain on the Lt side previously but had the same pain on the Rt side when she had her cyst on the right side rupture. Pt states that on Sunday she was sitting in a chair and she had a dog jump up into her lab. Pt denies any chest pain or SOB on arrival.  Pt is resting on stretcher with call light within reach. Breathing is non labored and no acute distress is noted.    Will continue to follow plan of care     Jade Robertson RN  06/02/22 6377 [facility],[address]  06/02/22 [id number]

## 2022-06-02 NOTE — ED NOTES
Pt placed on 2L Nasal canula.  Pt was 89% on RA with good wave form     Hilary Baker RN  06/02/22 [id number]

## 2022-06-02 NOTE — TELEPHONE ENCOUNTER
Pt called answering service around 5:30am stating that she has had increased abdominal pain today and is on her way to [facility] ER. Pt has had this pain before, and believes it feels like a cyst has ruptured.

## 2022-06-02 NOTE — ED PROVIDER NOTES
[facility]     Emergency Department     Faculty Attestation    I performed a history and physical examination of the patient and discussed management with the resident. I reviewed the residents note and agree with the documented findings including all diagnostic interpretations and plan of care. Any areas of disagreement are noted on the chart. I was personally present for the key portions of any procedures. I have documented in the chart those procedures where I was not present during the key portions. I have reviewed the emergency nurses triage note. I agree with the chief complaint, past medical history, past surgical history, allergies, medications, social and family history as documented unless otherwise noted below. Documentation of the HPI, Physical Exam and Medical Decision Making performed by scribes is based on my personal performance of the HPI, PE and MDM. For Physician Assistant/ Nurse Practitioner cases/documentation I have personally evaluated this patient and have completed at least one if not all key elements of the E/M (history, physical exam, and MDM). Additional findings are as noted. This patient was evaluated in the Emergency Department for symptoms described in the history of present illness. He/she was evaluated in the context of the global COVID-19 pandemic, which necessitated consideration that the patient might be at risk for infection with the SARS-CoV-2 virus that causes COVID-19. Institutional protocols and algorithms that pertain to the evaluation of patients at risk for COVID-19 are in a state of rapid change based on information released by regulatory bodies including the CDC and federal and state organizations. These policies and algorithms were followed during the patient's care in the ED. Primary Care Physician: Bartolo Mckeon MD    History:  This is a 80 y.o. female who presents to the Emergency Department with complaint of abdominal pain. Left lower quadrant. Had a dog jumped on her while she was sitting on the couch. She is on blood thinners. She also has a known history of a stable ovarian cyst that she is concerned may have been injured. No hematuria no vomiting. Patient does report that bumps in the road were particularly uncomfortable for her on the way to the hospital.    Physical:     height is 5' 3\" (1.6 m) and weight is 208 lb (94.3 kg). Her oral temperature is 97.6 °F (36.4 °C). Her blood pressure is 161/91 (abnormal) and her pulse is 74. Her respiration is 18 and oxygen saturation is 94%.    80 y.o. female no acute distress, cardiac exam regular rate and rhythm no murmurs rubs gallops, pulmonary clear bilaterally abdomen is soft conclusion to do she will patient to left lower quadrant there is no rebound no guarding no pain in the left side with deep palpation of the right side. There is no distention there is no rigidity.     Impression: Abdominal pain    Plan: Labs, coags, CT abdomen pelvis, symptomatic treatment      Vaibhav Estrada MD, [place]   Attending Emergency Physician        Deborah Villareal MD  06/02/22 [id number]

## 2022-06-02 NOTE — ED PROVIDER NOTES
[facility] ED  Emergency Department Encounter  EmergencyMedicine Resident     Pt Name:Yajaira Raya  MRN: [id number]  Jamesgfemily 1940  Date of evaluation: 6/2/22  PCP:  Enrrique Ospina MD    CHIEF COMPLAINT       Chief Complaint   Patient presents with    Ovarian Cyst     Lt sided possible rupture       HISTORY OF PRESENT ILLNESS  (Location/Symptom, Timing/Onset, Context/Setting, Quality, Duration, Modifying Factors, Severity.)      Margret Lopez is a 80 y.o. female who presents with left lower quadrant abdominal pain. Patient with history of cystadenoma left ovary, states that 4 days ago a dog jumped on her lap and she has had increasing pain on left lower quadrant of her abdomen since that time. Patient currently anticoagulated with warfarin for history of DVT and factor V Leiden. Denies any associated vomiting, diarrhea, bloody stools, dysuria, hematuria, flank pain. Pain is sharp, constant and nonradiating. Does report some associated nausea    PAST MEDICAL / SURGICAL / SOCIAL / FAMILY HISTORY      has a past medical history of Adenomatous colon polyp, Anxiety, Depression, Diverticulitis, Epidural abscess, Factor V Leiden ([address] GERD (gastroesophageal reflux disease), History of blood transfusion, History of DVT (deep vein thrombosis), Hyperlipidemia, Impaired fasting glucose, Kidney stones, and MRSA bacteremia. has a past surgical history that includes Colonoscopy (2011); Colonoscopy (7/23/2015); Tubal ligation (1978); Tonsillectomy (1950); ventral hernia repair (04/22/2016); Cataract removal (Bilateral, 2015); Total knee arthroplasty (Right, 08/02/2017); ovarian cyst removal (1976); lumbar laminectomy (10/04/2017); Appendectomy (1978); and pr incise finger tendon sheath (Right, 9/24/2018). Social History     Socioeconomic History    Marital status:       Spouse name: Not on file    Number of children: Not on file    Years of education: Not on file    Highest education level: Not on file   Occupational History    Not on file   Tobacco Use    Smoking status: Former Smoker     Packs/day: 1.00     Years: 17.00     Pack years: 17.00     Quit date: 1979     Years since quittin.4    Smokeless tobacco: Never Used   Vaping Use    Vaping Use: Never used   Substance and Sexual Activity    Alcohol use: No    Drug use: No    Sexual activity: Not Currently   Other Topics Concern    Not on file   Social History Narrative    Not on file     Social Determinants of Health     Financial Resource Strain: Low Risk     Difficulty of Paying Living Expenses: Not hard at all   Food Insecurity: No Food Insecurity    Worried About Running Out of Food in the Last Year: Never true    [place] of Food in the Last Year: Never true   Transportation Needs:     Lack of Transportation (Medical): Not on file    Lack of Transportation (Non-Medical):  Not on file   Physical Activity: Unknown    Days of Exercise per Week: 2 days    Minutes of Exercise per Session: Not on file   Stress:     Feeling of Stress : Not on file   Social Connections:     Frequency of Communication with Friends and Family: Not on file    Frequency of Social Gatherings with Friends and Family: Not on file    Attends Jew Services: Not on file    Active Member of [address] [place] [address] [employer] or Organizations: Not on file    Attends Club or Organization Meetings: Not on file    Marital Status: Not on file   Intimate Partner Violence:     Fear of Current or Ex-Partner: Not on file    Emotionally Abused: Not on file    Physically Abused: Not on file    Sexually Abused: Not on file   Housing Stability:     Unable to Pay for Housing in the Last Year: Not on file    Number of [place] in the Last Year: Not on file    Unstable Housing in the Last Year: Not on file       Family History   Problem Relation Age of Onset    Colon Cancer Father 48    Other Father         gastric ulcers    Ovarian Cancer Mother 80  Cancer Mother     Colon Cancer Brother 79    Other Brother 54        viral endocarditis -  at 54 on heart transplant list    Deep Vein Thrombosis Brother         factor V Leiden - on lifelong warfarin    Other Brother 7        Mitochondrial myopathy and Chronic Progressive External Ophthalmoplegia dx'd at age 9 (he turned 79 in 2018)    Other Brother         AAA       Allergies:  Latex and Percocet [oxycodone-acetaminophen]    Home Medications:  Prior to Admission medications    Medication Sig Start Date End Date Taking? Authorizing Provider   metroNIDAZOLE (FLAGYL) 500 MG tablet Take 1 tablet by mouth 2 times daily for 7 days 22 Yes Klaudia Bias, DO   ciprofloxacin (CIPRO) 500 mg tablet Take 1 tablet by mouth 2 times daily for 7 days 22 Yes Klaudia Ruvalcaba, DO   ondansetron (ZOFRAN ODT) 4 MG disintegrating tablet Take 1 tablet by mouth every 8 hours as needed for Nausea 22  Yes Klaudia Ruvalcaba, DO   dicyclomine (BENTYL) 10 MG capsule Take 1 capsule by mouth every 6 hours as needed (cramps) 22  Yes Klaudia Ruvalcaba, DO   RESTASIS 0.05 % ophthalmic emulsion Place into both eyes daily 22   Historical Provider, MD   [facility] 0.25 MG tablet Take 1 tablet by mouth nightly as needed for Sleep or Anxiety for up to 180 days. 22  MEDINA Queen CNP   clotrimazole-betamethasone (LOTRISONE) 1-0.05 % cream Apply topically 2 times daily as needed for skin rash. Not to exceed 6 weeks of continuous usage 22   Roxy Hernandez MD   nystatin (MYCOSTATIN) 710687 UNIT/GM powder Apply 3 times daily as needed for rash.  10/21/21   Roxy Hernandez MD   sertraline (ZOLOFT) 50 MG tablet Take 1 tablet by mouth daily Along with 100 mg for total daily dose 150 mg QD 10/6/21   MEDINA Queen CNP   sertraline (ZOLOFT) 100 MG tablet Take 1 tablet by mouth daily (take along with 50 mg tablet for total daily dose of 150 mg daily) 10/6/21   Tuan Childers APRN - CNP   ezetimibe (ZETIA) 10 MG tablet Take 1 tablet by mouth daily 10/6/21   Edyta Jonas, APRN - CNP   warfarin (COUMADIN) 7.5 MG tablet Take 1 tablet by mouth daily [facility]:  7.5 daily 8/16/21   Catrachito Soto MD   Cholecalciferol (VITAMIN D3) 1.25 MG (58647 UT) CAPS Take 1 capsule by mouth daily     Historical Provider, MD   vitamin B-12 (CYANOCOBALAMIN) 100 MCG tablet Take 100 mcg by mouth daily     Historical Provider, MD   acetaminophen (TYLENOL) 500 MG tablet Take 500 mg by mouth every 4 hours as needed     Historical Provider, MD   Coenzyme Q10 (COQ-10) 100 MG CAPS Take 100 mg by mouth daily     Historical Provider, MD       REVIEW OF SYSTEMS    (2-9 systems for level 4, 10 or more for level 5)      Review of Systems   Constitutional: Negative for fatigue and fever. Respiratory: Negative for cough, shortness of breath and wheezing. Cardiovascular: Negative for chest pain and palpitations. Gastrointestinal: Positive for abdominal pain and nausea. Negative for blood in stool, diarrhea and vomiting. Genitourinary: Negative for dysuria and flank pain. Skin: Negative for rash and wound. Neurological: Negative for syncope, weakness, light-headedness and numbness. Psychiatric/Behavioral: Negative for confusion. PHYSICAL EXAM   (up to 7 for level 4, 8 or more for level 5)      INITIAL VITALS:   /78   Pulse 81   Temp 97.6 °F (36.4 °C) (Oral)   Resp 20   Ht 5' 3\" (1.6 m)   Wt 208 lb (94.3 kg)   SpO2 94%   BMI 36.85 kg/m²     Physical Exam  Constitutional:       General: She is not in acute distress. Appearance: She is not toxic-appearing. HENT:      Head: Normocephalic and atraumatic. Eyes:      Extraocular Movements: Extraocular movements intact. Pupils: Pupils are equal, round, and reactive to light. Cardiovascular:      Rate and Rhythm: Normal rate. Heart sounds: No murmur heard. No gallop. Pulmonary:      Breath sounds:  No wheezing, rhonchi or rales. Abdominal:      Tenderness: There is abdominal tenderness (LLQ). There is no guarding or rebound. Musculoskeletal:      Right lower leg: No edema. Left lower leg: No edema. Skin:     Coloration: Skin is not jaundiced or pale. Neurological:      Mental Status: She is alert. Sensory: No sensory deficit. Motor: No weakness.          DIFFERENTIAL  DIAGNOSIS     PLAN (LABS / IMAGING / EKG):  Orders Placed This Encounter   Procedures    CT ABDOMEN PELVIS W IV CONTRAST Additional Contrast? None    CBC with Auto Differential    Comprehensive Metabolic Panel w/ Reflex to MG    Lipase    Protime-INR    APTT       MEDICATIONS ORDERED:  Orders Placed This Encounter   Medications    0.9 % sodium chloride bolus    fentaNYL (SUBLIMAZE) injection 50 mcg    ondansetron (ZOFRAN) injection 4 mg    iopamidol (ISOVUE-370) 76 % injection 75 mL    metroNIDAZOLE (FLAGYL) 500 MG tablet     Sig: Take 1 tablet by mouth 2 times daily for 7 days     Dispense:  14 tablet     Refill:  0    ciprofloxacin (CIPRO) 500 mg tablet     Sig: Take 1 tablet by mouth 2 times daily for 7 days     Dispense:  14 tablet     Refill:  0    ondansetron (ZOFRAN ODT) 4 MG disintegrating tablet     Sig: Take 1 tablet by mouth every 8 hours as needed for Nausea     Dispense:  20 tablet     Refill:  0    dicyclomine (BENTYL) 10 MG capsule     Sig: Take 1 capsule by mouth every 6 hours as needed (cramps)     Dispense:  20 capsule     Refill:  0       DDX: Ovarian hemorrhage, diverticulitis, diverticulosis, small bowel obstruction, perforated bowel, retroperitoneal hematoma, AAA    DIAGNOSTIC RESULTS / EMERGENCY DEPARTMENT COURSE / MDM   LAB RESULTS:  Results for orders placed or performed during the hospital encounter of 06/02/22   CBC with Auto Differential   Result Value Ref Range    WBC 10.4 3.5 - 11.3 k/uL    RBC 5.32 (H) 3.95 - 5.11 m/uL    Hemoglobin 15.7 (H) 11.9 - 15.1 g/dL    Hematocrit 49.6 (H) 36.3 - 47.1 %    MCV 93.2 82.6 - 102.9 fL    MCH 29.5 25.2 - 33.5 pg    MCHC 31.7 28.4 - 34.8 g/dL    RDW 13.9 11.8 - 14.4 %    Platelets 053 026 - 752 k/uL    MPV 9.1 8.1 - 13.5 fL    NRBC Automated 0.0 0.0 per 100 WBC    Seg Neutrophils 79 (H) 36 - 65 %    Lymphocytes 10 (L) 24 - 43 %    Monocytes 7 3 - 12 %    Eosinophils % 2 1 - 4 %    Basophils 1 0 - 2 %    Immature Granulocytes 1 (H) 0 %    Segs Absolute 8.32 (H) 1.50 - 8.10 k/uL    Absolute Lymph # 0.99 (L) 1.10 - 3.70 k/uL    Absolute Mono # 0.68 0.10 - 1.20 k/uL    Absolute Eos # 0.24 0.00 - 0.44 k/uL    Basophils Absolute 0.08 0.00 - 0.20 k/uL    Absolute Immature Granulocyte 0.09 0.00 - 0.30 k/uL   Comprehensive Metabolic Panel w/ Reflex to MG   Result Value Ref Range    Glucose 136 (H) 70 - 99 mg/dL    BUN 17 8 - 23 mg/dL    CREATININE 0.78 0.50 - 0.90 mg/dL    Calcium 9.9 8.6 - 10.4 mg/dL    Sodium 139 135 - 144 mmol/L    Potassium 4.4 3.7 - 5.3 mmol/L    Chloride 102 98 - 107 mmol/L    CO2 22 20 - 31 mmol/L    Anion Gap 15 9 - 17 mmol/L    Alkaline Phosphatase 108 (H) 35 - 104 U/L    ALT 17 5 - 33 U/L    AST 18 <32 U/L    Total Bilirubin 0.36 0.3 - 1.2 mg/dL    Total Protein 7.9 6.4 - 8.3 g/dL    Albumin 5.0 3.5 - 5.2 g/dL    Albumin/Globulin Ratio 1.7 1.0 - 2.5    GFR Non-African American >60 >60 mL/min    GFR African American >60 >60 mL/min    GFR Comment         Lipase   Result Value Ref Range    Lipase 43 13 - 60 U/L   Protime-INR   Result Value Ref Range    Protime 16.0 (H) 9.1 - 12.3 sec    INR 1.6    APTT   Result Value Ref Range    PTT 31.5 (H) 20.5 - 30.5 sec       IMPRESSION: 80year-old female in no acute distress presenting with left lower quadrant abdominal pain after injury in which a dog jumped on her lap. Patient is currently anticoagulated with warfarin for history of DVT and factor V [place]. Tenderness to palpation of left lower quadrant. Exam otherwise unremarkable. Will get labs occluding CBC, CMP, coags, plan for CT abdomen/pelvis. Symptomatic treatment. Admission versus discharge pending clinical course and findings. RADIOLOGY:  CT ABDOMEN PELVIS W IV CONTRAST Additional Contrast? None    Result Date: 6/2/2022  EXAMINATION: CT OF THE ABDOMEN AND PELVIS WITH CONTRAST 6/2/2022 9:39 am TECHNIQUE: CT of the abdomen and pelvis was performed with the administration of intravenous contrast. Multiplanar reformatted images are provided for review. Automated exposure control, iterative reconstruction, and/or weight based adjustment of the mA/kV was utilized to reduce the radiation dose to as low as reasonably achievable. COMPARISON: CT abdomen and pelvis dated 04/11/2022 HISTORY: ORDERING SYSTEM PROVIDED HISTORY: LLQ pain after injury. Anticoagulated TECHNOLOGIST PROVIDED HISTORY: LLQ pain after injury. Anticoagulated Decision Support Exception - unselect if not a suspected or confirmed emergency medical condition->Emergency Medical Condition (MA) Reason for Exam: LLQ pain after injury. Anticoagulated History of left ovarian cystadenoma. FINDINGS: Lower Chest: There are strandy and ground-glass densities at the dependent lung bases, likely atelectasis. No pleural effusion. Organs: Liver is mildly hypoattenuating and enlarged. A couple small hypodensities in the liver are unchanged, measuring up to 1.8 cm, likely cysts. The spleen, pancreas, gallbladder, and adrenal glands are unremarkable. There is symmetric enhancement of the kidneys. No hydronephrosis or perinephric inflammation. There is a punctate calculus in the left kidney. GI/Bowel: There is scattered colonic diverticulosis. There is focal mild pericolonic inflammation at the junction of the descending and sigmoid colon (left lower quadrant). No extraluminal gas or fluid collection. No abnormal bowel distention. No free air or ascites. Appendix is not seen. Pelvis: Cystocele noted. The urinary bladder is otherwise within normal limits. Uterus is grossly unremarkable.   There is an unchanged cystic lesion at the left adnexa, measuring up to 5.8 cm. No free fluid in the pelvis. No pelvic lymphadenopathy. Peritoneum/Retroperitoneum: There is moderate atherosclerosis. Abdominal aorta is mildly ectatic, measuring up to 2.7 cm, which is unchanged. There is no retroperitoneal or mesenteric lymphadenopathy. Bones/Soft Tissues: Degenerative changes noted in the lumbar spine. There is no acute or suspicious osseous abnormality. Visualized superficial soft tissues are within normal limits. 1.  Findings are most compatible with mild acute uncomplicated diverticulitis at the junction of the descending and sigmoid colon. 2.  Hepatomegaly and hepatic steatosis. 3.  Unchanged cystic lesion in the left adnexa, reportedly a cystadenoma. 4.  Additional chronic incidental findings described above. RECOMMENDATIONS: Unavailable     NM BONE SCAN WHOLE BODY    Result Date: 5/6/2022  EXAMINATION: WHOLE BODY BONE SCAN  5/6/2022 TECHNIQUE: The patient was injected intravenously with 25.7 mCi of 99 mTc MDP and scintigraphy of the entire skeleton was performed approximately three hours later. AP and PA cone-down images of the thorax, pelvis and head are submitted. COMPARISON: CT chest, abdomen and pelvis 11 April 2022 HISTORY: ORDERING SYSTEM PROVIDED HISTORY: Bone lesion TECHNOLOGIST PROVIDED HISTORY: sclerotic lesion of left scapula Degenerative change spine L4-L5. FINDINGS: Foci of activity in the shoulders, sternoclavicular joints, hips, knees, ankles and feet as well as SI joints are most likely degenerative. Increased radiotracer uptake in the lower lumbar spine corresponding to advanced degenerative disc changes at L3-L4 with obliteration of the disc space. Mild heterogeneity is noted in the thoracic spine with uptake near the endplates corresponding to degenerative changes on CT. No other suspicious areas of radiotracer uptake are noted.   Photopenia in the right knee is noted consistent with knee arthroplasty. Physiologic activity is present in the skeletal and renal collecting systems. No evidence to suggest osseous metastatic disease. Uptake most compatible with arthritic change. EMERGENCY DEPARTMENT COURSE:    Labs unremarkable, INR 1.6, no anemia, no significant electrolyte abnormality, no leukocytosis. CT abdomen/pelvis with evidence of uncomplicated diverticulitis. Patient was treated with fentanyl for pain control in emergency department, also given bolus of IV fluids. Vital signs within normal limits throughout entire ED course. Patient able to tolerate p.o. intake while in emergency department. She was given outpatient prescriptions for Flagyl, ciprofloxacin, Bentyl and Zofran. Given strict ED return precautions and follow-up directions for reevaluation by her primary care physician. She verbalized understanding of and agreement with the discharge plan      PROCEDURES:  None    CONSULTS:  None    CRITICAL CARE:  Please see attending note    FINAL IMPRESSION      1.  Diverticulitis of colon          DISPOSITION / PLAN     DISPOSITION Decision To Discharge 06/02/2022 10:54:49 AM      PATIENT REFERRED TO:  Neli Gomez MD  [address] [id number]  [phone]    Schedule an appointment as soon as possible for a visit in 3 days  For re-evaluation      DISCHARGE MEDICATIONS:  Discharge Medication List as of 6/2/2022 10:52 AM      START taking these medications    Details   metroNIDAZOLE (FLAGYL) 500 MG tablet Take 1 tablet by mouth 2 times daily for 7 days, Disp-14 tablet, R-0Print      ciprofloxacin (CIPRO) 500 mg tablet Take 1 tablet by mouth 2 times daily for 7 days, Disp-14 tablet, R-0Print      ondansetron (ZOFRAN ODT) 4 MG disintegrating tablet Take 1 tablet by mouth every 8 hours as needed for Nausea, Disp-20 tablet, R-0Print      dicyclomine (BENTYL) 10 MG capsule Take 1 capsule by mouth every 6 hours as needed (cramps), Disp-20 capsule, R-0Print Trino Manzo DO  Emergency Medicine Resident    (Please note that portions of thisnote were completed with a voice recognition program.  Efforts were made to edit the dictations but occasionally words are mis-transcribed.)        Trino Manzo DO  Resident  06/02/22 [id number]

## 2022-06-03 RX ORDER — DICYCLOMINE HYDROCHLORIDE 10 MG/1
CAPSULE ORAL
Qty: 20 CAPSULE | Refills: 0 | OUTPATIENT
Start: 2022-06-03

## 2022-06-13 ENCOUNTER — HOSPITAL ENCOUNTER (OUTPATIENT)
Dept: PHARMACY | Age: 82
Setting detail: THERAPIES SERIES
Discharge: HOME | End: 2022-06-13
Payer: MEDICARE

## 2022-06-13 VITALS
BODY MASS INDEX: 37.38 KG/M2 | DIASTOLIC BLOOD PRESSURE: 69 MMHG | WEIGHT: 211 LBS | HEART RATE: 73 BPM | SYSTOLIC BLOOD PRESSURE: 140 MMHG

## 2022-06-13 DIAGNOSIS — Z79.01 LONG TERM CURRENT USE OF ANTICOAGULANT THERAPY: Primary | ICD-10-CM

## 2022-06-13 LAB — INR BLD: 1.9

## 2022-06-13 PROCEDURE — 99211 OFF/OP EST MAY X REQ PHY/QHP: CPT

## 2022-06-13 PROCEDURE — 85610 PROTHROMBIN TIME: CPT

## 2022-06-13 NOTE — PROGRESS NOTES
[address]/[place]  Medication Management  ANTICOAGULATION    Referring Provider: Vania Quintana CNP    GOAL INR: 2-3    TODAY'S INR: 1.9    WARFARIN Dosage: Continue warfarin 7.5 mg po daily    INR (no units)   Date Value   2022 1.9   2022 1.6   2022 1.9   2022 1.6   2022 2.1   2022 1.7   2022 1.2       Medication changes:  Completed 7 days of metronidazole and ciprofloxacin 22 - diagnosis diverticulitis  Dicyclomine PRN - not taking  Ondansetron PRN - not taking    Notes:    Fingerstick INR drawn per clinic protocol. Patient states no visible blood in urine and no black tarry stool. Denies any missed doses of warfarin. No change in other maintenance medications or in diet. Will recheck INR in 4 weeks. Patient acknowledges working in consult agreement with pharmacist as referred by his/her physician. Patient was seen at [facility] ED on 22 and diagnosed with diverticulitis. She received outpatient prescriptions for Flagyl, ciprofloxacin, dicyclomine and ondansetron. She reports diarrhea has subsided. Patient's reports she plans to have 1 zakia this evening when she returns to [facility] - therefore will not have her take extra warfarin this evening. We did discuss avoiding alcohol with Flagyl. It is recommended to avoid alcohol for at least 72 hours after last dose of Flagyl (it has been 5 days since last dose). For Pharmacy Admin Tracking Only     Intervention Detail: Adherence Monitorin   Total # of Interventions Recommended: 1   Total # of Interventions Accepted: 1   Time Spent (min): 280 [facility].  Jayy Morejon, [facility]

## 2022-07-15 ENCOUNTER — HOSPITAL ENCOUNTER (OUTPATIENT)
Dept: PHARMACY | Age: 82
Setting detail: THERAPIES SERIES
Discharge: HOME | End: 2022-07-15
Payer: MEDICARE

## 2022-07-15 VITALS
DIASTOLIC BLOOD PRESSURE: 75 MMHG | SYSTOLIC BLOOD PRESSURE: 144 MMHG | BODY MASS INDEX: 37.2 KG/M2 | WEIGHT: 210 LBS | HEART RATE: 107 BPM

## 2022-07-15 DIAGNOSIS — Z79.01 LONG TERM CURRENT USE OF ANTICOAGULANT THERAPY: Primary | ICD-10-CM

## 2022-07-15 LAB — INR BLD: 1.4

## 2022-07-15 PROCEDURE — 85610 PROTHROMBIN TIME: CPT

## 2022-07-15 PROCEDURE — 99211 OFF/OP EST MAY X REQ PHY/QHP: CPT

## 2022-07-15 NOTE — PROGRESS NOTES
[address] [facility]/[place]  Medication Management  ANTICOAGULATION    Referring Provider: Chin Basilio CNP    GOAL INR: 2-3    TODAY'S INR: 1.4    WARFARIN Dosage: Continue warfarin 7.5 mg po daily  Patient will take warfarin 7.5 mg po today for 1 dose    INR (no units)   Date Value   07/15/2022 1.4   2022 1.9   2022 1.6   2022 1.9   2022 1.6   2022 2.1   2022 1.7       Medication changes:  No changes  Notes:    Fingerstick INR drawn per clinic protocol. Patient states no visible blood in urine and no black tarry stool. No change in other maintenance medications or in diet. Will recheck INR in 5 weeks. Patient acknowledges working in consult agreement with pharmacist as referred by his/her physician. Patient missed warfarin dose last evening. She reports that she had more greens than usual this past week also. For Pharmacy Admin Tracking Only    Intervention Detail: Adherence Monitorin and Dose Adjustment: 1, reason: Therapy Optimization  Total # of Interventions Recommended: 2  Total # of Interventions Accepted: 2  Time Spent (min): RAFAEL/ Katie [address].  [place], [facility]

## 2022-07-28 ENCOUNTER — TELEPHONE (OUTPATIENT)
Dept: GYNECOLOGIC ONCOLOGY | Age: 82
End: 2022-07-28

## 2022-07-28 NOTE — TELEPHONE ENCOUNTER
Pt called to see what testing she needed to complete prior to next visit. Informed an order was placed for CT Scan of Chest w/o contrast. Phone number provided to [facility]. She will call and schedule. Notified to call office if she needs any assistance in scheduling. She voiced understanding.

## 2022-08-05 ENCOUNTER — HOSPITAL ENCOUNTER (OUTPATIENT)
Dept: CT IMAGING | Age: 82
Discharge: HOME | End: 2022-08-07
Payer: MEDICARE

## 2022-08-05 DIAGNOSIS — R91.8 PULMONARY NODULES: ICD-10-CM

## 2022-08-05 PROCEDURE — 71250 CT THORAX DX C-: CPT

## 2022-08-24 ENCOUNTER — HOSPITAL ENCOUNTER (OUTPATIENT)
Dept: PHARMACY | Age: 82
Setting detail: THERAPIES SERIES
Discharge: HOME | End: 2022-08-24
Payer: MEDICARE

## 2022-08-24 VITALS
WEIGHT: 212 LBS | HEART RATE: 79 BPM | SYSTOLIC BLOOD PRESSURE: 146 MMHG | BODY MASS INDEX: 37.55 KG/M2 | DIASTOLIC BLOOD PRESSURE: 81 MMHG

## 2022-08-24 DIAGNOSIS — Z79.01 LONG TERM CURRENT USE OF ANTICOAGULANT THERAPY: Primary | ICD-10-CM

## 2022-08-24 LAB — INR BLD: 3.1

## 2022-08-24 PROCEDURE — 85610 PROTHROMBIN TIME: CPT

## 2022-08-24 PROCEDURE — 99211 OFF/OP EST MAY X REQ PHY/QHP: CPT

## 2022-08-24 NOTE — PATIENT INSTRUCTIONS
Please hold warfarin today. Continue current dose of warfarin as instructed on dosing calendar provided - 7.5 mg daily. Continue to monitor urine and stool for signs and symptoms of bleeding. Please notify the clinic of any medication changes.

## 2022-08-24 NOTE — PROGRESS NOTES
[facility] [place] [address]/[place]  Medication Management  ANTICOAGULATION    Referring Provider: Cliff Crespo CNP    GOAL INR: 2-3    TODAY'S INR: 3.1    WARFARIN Dosage: Patient will hold warfarin dose today, then resume 7.5 mg po daily    INR (no units)   Date Value   2022 3.1   07/15/2022 1.4   2022 1.9   2022 1.6   2022 1.9   2022 1.6   2022 2.1       Medication changes:  No changes  Notes:    Fingerstick INR drawn per clinic protocol. Patient states no visible blood in urine and no black tarry stool. Denies any missed doses of warfarin. No change in other maintenance medications or in diet. Will recheck INR in 6 weeks. Patient acknowledges working in consult agreement with pharmacist as referred by his/her physician. For Pharmacy Admin Tracking Only    Intervention Detail: Adherence Monitorin and Dose Adjustment: 1, reason: Improve Adherence, Therapy Optimization  Total # of Interventions Recommended: 2  Total # of Interventions Accepted: 2  Time Spent (min): [address] [facility].  Patricia Cruz

## 2022-09-17 DIAGNOSIS — L30.4 INTERTRIGO: ICD-10-CM

## 2022-09-19 RX ORDER — CLOTRIMAZOLE AND BETAMETHASONE DIPROPIONATE 10; .64 MG/G; MG/G
CREAM TOPICAL
Qty: 45 G | Refills: 1 | Status: SHIPPED | OUTPATIENT
Start: 2022-09-19

## 2022-09-30 ENCOUNTER — HOSPITAL ENCOUNTER (OUTPATIENT)
Dept: PHARMACY | Age: 82
Setting detail: THERAPIES SERIES
Discharge: HOME | End: 2022-09-30

## 2022-09-30 ENCOUNTER — HOSPITAL ENCOUNTER (EMERGENCY)
Age: 82
Discharge: HOME | End: 2022-09-30
Attending: EMERGENCY MEDICINE
Payer: MEDICARE

## 2022-09-30 ENCOUNTER — APPOINTMENT (OUTPATIENT)
Dept: GENERAL RADIOLOGY | Age: 82
End: 2022-09-30
Payer: MEDICARE

## 2022-09-30 ENCOUNTER — APPOINTMENT (OUTPATIENT)
Dept: CT IMAGING | Age: 82
End: 2022-09-30
Payer: MEDICARE

## 2022-09-30 VITALS
RESPIRATION RATE: 20 BRPM | OXYGEN SATURATION: 99 % | HEART RATE: 83 BPM | SYSTOLIC BLOOD PRESSURE: 146 MMHG | HEIGHT: 63 IN | BODY MASS INDEX: 37.21 KG/M2 | WEIGHT: 210 LBS | DIASTOLIC BLOOD PRESSURE: 103 MMHG

## 2022-09-30 DIAGNOSIS — S09.90XA CLOSED HEAD INJURY, INITIAL ENCOUNTER: Primary | ICD-10-CM

## 2022-09-30 DIAGNOSIS — S40.011A CONTUSION OF MULTIPLE SITES OF RIGHT SHOULDER AND UPPER ARM, INITIAL ENCOUNTER: ICD-10-CM

## 2022-09-30 DIAGNOSIS — S40.021A CONTUSION OF MULTIPLE SITES OF RIGHT SHOULDER AND UPPER ARM, INITIAL ENCOUNTER: ICD-10-CM

## 2022-09-30 DIAGNOSIS — S16.1XXA STRAIN OF NECK MUSCLE, INITIAL ENCOUNTER: ICD-10-CM

## 2022-09-30 DIAGNOSIS — S80.00XA CONTUSION OF KNEE, UNSPECIFIED LATERALITY, INITIAL ENCOUNTER: ICD-10-CM

## 2022-09-30 LAB
ABSOLUTE EOS #: 0.26 K/UL (ref 0–0.44)
ABSOLUTE IMMATURE GRANULOCYTE: 0.07 K/UL (ref 0–0.3)
ABSOLUTE LYMPH #: 1.11 K/UL (ref 1.1–3.7)
ABSOLUTE MONO #: 0.63 K/UL (ref 0.1–1.2)
ANION GAP SERPL CALCULATED.3IONS-SCNC: 13 MMOL/L (ref 9–17)
BASOPHILS # BLD: 1 % (ref 0–2)
BASOPHILS ABSOLUTE: 0.08 K/UL (ref 0–0.2)
BUN BLDV-MCNC: 21 MG/DL (ref 8–23)
BUN/CREAT BLD: 25 (ref 9–20)
CALCIUM SERPL-MCNC: 9.8 MG/DL (ref 8.6–10.4)
CHLORIDE BLD-SCNC: 105 MMOL/L (ref 98–107)
CO2: 24 MMOL/L (ref 20–31)
CREAT SERPL-MCNC: 0.84 MG/DL (ref 0.5–0.9)
EOSINOPHILS RELATIVE PERCENT: 4 % (ref 1–4)
GFR AFRICAN AMERICAN: >60 ML/MIN
GFR NON-AFRICAN AMERICAN: >60 ML/MIN
GFR SERPL CREATININE-BSD FRML MDRD: ABNORMAL ML/MIN/{1.73_M2}
GFR SERPL CREATININE-BSD FRML MDRD: ABNORMAL ML/MIN/{1.73_M2}
GLUCOSE BLD-MCNC: 92 MG/DL (ref 70–99)
HCT VFR BLD CALC: 43.7 % (ref 36.3–47.1)
HEMOGLOBIN: 14.4 G/DL (ref 11.9–15.1)
IMMATURE GRANULOCYTES: 1 %
INR BLD: 1.1
LYMPHOCYTES # BLD: 16 % (ref 24–43)
MCH RBC QN AUTO: 30.6 PG (ref 25.2–33.5)
MCHC RBC AUTO-ENTMCNC: 33 G/DL (ref 28.4–34.8)
MCV RBC AUTO: 93 FL (ref 82.6–102.9)
MONOCYTES # BLD: 9 % (ref 3–12)
NRBC AUTOMATED: 0 PER 100 WBC
PARTIAL THROMBOPLASTIN TIME: 28.4 SEC (ref 26.8–34.8)
PDW BLD-RTO: 13.2 % (ref 11.8–14.4)
PLATELET # BLD: 271 K/UL (ref 138–453)
PMV BLD AUTO: 10 FL (ref 8.1–13.5)
POTASSIUM SERPL-SCNC: 4.3 MMOL/L (ref 3.7–5.3)
PROTHROMBIN TIME: 14.1 SEC (ref 11.5–14.2)
RBC # BLD: 4.7 M/UL (ref 3.95–5.11)
SEG NEUTROPHILS: 69 % (ref 36–65)
SEGMENTED NEUTROPHILS ABSOLUTE COUNT: 5.03 K/UL (ref 1.5–8.1)
SODIUM BLD-SCNC: 142 MMOL/L (ref 135–144)
WBC # BLD: 7.2 K/UL (ref 3.5–11.3)

## 2022-09-30 PROCEDURE — 99284 EMERGENCY DEPT VISIT MOD MDM: CPT

## 2022-09-30 PROCEDURE — 85025 COMPLETE CBC W/AUTO DIFF WBC: CPT

## 2022-09-30 PROCEDURE — 73562 X-RAY EXAM OF KNEE 3: CPT

## 2022-09-30 PROCEDURE — 6370000000 HC RX 637 (ALT 250 FOR IP): Performed by: EMERGENCY MEDICINE

## 2022-09-30 PROCEDURE — 85610 PROTHROMBIN TIME: CPT

## 2022-09-30 PROCEDURE — 70450 CT HEAD/BRAIN W/O DYE: CPT

## 2022-09-30 PROCEDURE — 72125 CT NECK SPINE W/O DYE: CPT

## 2022-09-30 PROCEDURE — 85730 THROMBOPLASTIN TIME PARTIAL: CPT

## 2022-09-30 PROCEDURE — 73030 X-RAY EXAM OF SHOULDER: CPT

## 2022-09-30 PROCEDURE — 36415 COLL VENOUS BLD VENIPUNCTURE: CPT

## 2022-09-30 PROCEDURE — 80048 BASIC METABOLIC PNL TOTAL CA: CPT

## 2022-09-30 RX ORDER — ACETAMINOPHEN 325 MG/1
650 TABLET ORAL ONCE
Status: COMPLETED | OUTPATIENT
Start: 2022-09-30 | End: 2022-09-30

## 2022-09-30 RX ADMIN — ACETAMINOPHEN 650 MG: 325 TABLET, FILM COATED ORAL at 12:47

## 2022-09-30 ASSESSMENT — PAIN SCALES - GENERAL
PAINLEVEL_OUTOF10: 9
PAINLEVEL_OUTOF10: 7
PAINLEVEL_OUTOF10: 7

## 2022-09-30 ASSESSMENT — PAIN DESCRIPTION - DESCRIPTORS
DESCRIPTORS: ACHING;SHARP
DESCRIPTORS_2: ACHING

## 2022-09-30 ASSESSMENT — PAIN DESCRIPTION - ORIENTATION
ORIENTATION: RIGHT;LEFT
ORIENTATION_2: LEFT;RIGHT

## 2022-09-30 ASSESSMENT — PAIN - FUNCTIONAL ASSESSMENT
PAIN_FUNCTIONAL_ASSESSMENT: 0-10
PAIN_FUNCTIONAL_ASSESSMENT: 0-10

## 2022-09-30 ASSESSMENT — PAIN DESCRIPTION - LOCATION
LOCATION: SHOULDER
LOCATION_2: KNEE

## 2022-09-30 ASSESSMENT — PAIN DESCRIPTION - INTENSITY: RATING_2: 4

## 2022-09-30 NOTE — ED PROVIDER NOTES
[facility] ED  EMERGENCY DEPARTMENT ENCOUNTER      Pt Name: Magy Quevedo  MRN: [id number]  [place] 1940  Date of evaluation: 9/30/2022  Provider: Jody Garcia MD    CHIEF COMPLAINT       Chief Complaint   Patient presents with    Fall     Pt rapid response. Tripped over flip flop while coming into Coumadin Clinic, c/o bilat shoulder pain and knee pain, denies hitting head          HISTORY OF PRESENT ILLNESS   (Location/Symptom, Timing/Onset, Context/Setting, Quality, Duration, Modifying Factors, Severity)  Note limiting factors. Magy Quevedo is a 80 y.o. female who presents to the emergency department     Very pleasant 80-year-old female presents emergency department after sustaining mechanical fall. Patient relates that she was holding onto the banister while walking down to the Coumadin clinic misjudged the step and fell directly onto her right shoulder. She is on Coumadin. She does admit to some high right-sided neck discomfort and right occipital region head discomfort. Patient also relates that she has right and left knee discomfort. There was no loss of consciousness. Patient denies any anterior chest pain. She denies any preceding headache      Nursing Notes were reviewed. REVIEW OF SYSTEMS    (2-9 systems for level 4, 10 or more for level 5)     Review of Systems   All other systems reviewed and are negative. Except as noted above the remainder of the review of systems was reviewed and negative.        PAST MEDICAL HISTORY     Past Medical History:   Diagnosis Date    Adenomatous colon polyp 7/2015    tubular adenoma    Anxiety     Depression     Diverticulitis 2009    Epidural abscess 09/2017    with diskitis / osteomyelitis s/p IV Abx course and L2-3 laminectomy    Factor V Leiden (HCC)     GERD (gastroesophageal reflux disease)     History of blood transfusion     no reaction    History of DVT (deep vein thrombosis)     DVT right leg    Hyperlipidemia     Impaired fasting glucose     Kidney stones     MRSA bacteremia 09/23/2017         SURGICAL HISTORY       Past Surgical History:   Procedure Laterality Date    APPENDECTOMY  1978    CATARACT REMOVAL Bilateral 2015    Dr. Angela Echols - no polyps, pan-diverticulosis    COLONOSCOPY  7/23/2015    -polyp (tubular adenoma),diverticulosis,hemorrhoids    LUMBAR LAMINECTOMY  10/04/2017    [facility] - L2-3 and partial L4, due to diskitis / ostemyelitis of the lumbar spine    OVARIAN CYST REMOVAL  1976    [place] INCISE FINGER TENDON SHEATH Right 9/24/2018    Dr. Janeen Linares - trigger finger release right index, middle and ring fingers    TONSILLECTOMY  1950    TOTAL KNEE ARTHROPLASTY Right 08/02/2017    Dr. Farhad Gray in [address]  04/22/2016    Dr. Farzad Spears - robot assisted laproscopic ventral hernia repair with mesh         CURRENT MEDICATIONS       Discharge Medication List as of 9/30/2022  2:44 PM        CONTINUE these medications which have NOT CHANGED    Details   liraglutide-weight management 18 MG/3ML SOPN Inject 0.6 mg into the skin once a week Increase by 0.6mg weekly until you reach 3mg weekly, Disp-1 Adjustable Dose Pre-filled Pen Syringe, R-5Normal      clotrimazole-betamethasone (LOTRISONE) 1-0.05 % cream APPLY TOPICALLY TWO TIMES A DAY AS NEEDED FOR SKIN RASH. NOT TO EXCEED 6 WEEKS OF CONTINUOUS USAGE, Disp-45 g, R-1, Normal      warfarin (COUMADIN) 7.5 MG tablet TAKE ONE TABLET BY MOUTH DAILY, Disp-90 tablet, R-3Normal      Semaglutide-Weight Management (WEGOVY) 0.25 MG/0.5ML SOAJ SC injection Inject 0.25 mg into the skin every 7 days for 28 days, Disp-2 mL, R-0Normal      Semaglutide-Weight Management (WEGOVY) 1 MG/0.5ML SOAJ SC injection Inject 1 mg into the skin every 7 days for 28 days, Disp-2 mL, R-0Normal      Semaglutide-Weight Management (WEGOVY) 1.7 MG/0.75ML SOAJ SC injection Inject 1.7 mg into the skin every 7 days for 28 days, Disp-3 mL, R-0Normal      Semaglutide-Weight Management (WEGOVY) 2.4 MG/0.75ML SOAJ SC injection Inject 2.4 mg into the skin every 7 days for 28 days, Disp-3 mL, R-0Normal      ondansetron (ZOFRAN ODT) 4 MG disintegrating tablet Take 1 tablet by mouth every 8 hours as needed for Nausea, Disp-20 tablet, R-0Print      dicyclomine (BENTYL) 10 MG capsule Take 1 capsule by mouth every 6 hours as needed (cramps), Disp-20 capsule, R-0Print      RESTASIS 0.05 % ophthalmic emulsion Place into both eyes daily, DAWHistorical Med      nystatin (MYCOSTATIN) 363585 UNIT/GM powder Apply 3 times daily as needed for rash., Disp-1 each, R-5, Normal      !! sertraline (ZOLOFT) 50 MG tablet Take 1 tablet by mouth daily Along with 100 mg for total daily dose 150 mg QD, Disp-90 tablet, R-3Normal      !! sertraline (ZOLOFT) 100 MG tablet Take 1 tablet by mouth daily (take along with 50 mg tablet for total daily dose of 150 mg daily), Disp-90 tablet, R-3Normal      ezetimibe (ZETIA) 10 MG tablet Take 1 tablet by mouth daily, Disp-30 tablet, R-5Normal      Cholecalciferol (VITAMIN D3) 1.25 MG (16693 UT) CAPS Take 1 capsule by mouth daily Historical Med      vitamin B-12 (CYANOCOBALAMIN) 100 MCG tablet Take 100 mcg by mouth daily Historical Med      acetaminophen (TYLENOL) 500 MG tablet Take 500 mg by mouth every 4 hours as needed Historical Med      Coenzyme Q10 (COQ-10) 100 MG CAPS Take 100 mg by mouth daily Historical Med       !! - Potential duplicate medications found. Please discuss with provider.           ALLERGIES     Latex and Percocet [oxycodone-acetaminophen]    FAMILY HISTORY       Family History   Problem Relation Age of Onset    Colon Cancer Father 48    Other Father         gastric ulcers    Ovarian Cancer Mother 80    Cancer Mother     Colon Cancer Brother 79    Other Brother 54        viral endocarditis -  at 54 on heart transplant list    Deep Vein Thrombosis Brother         factor V Leiden - on lifelong warfarin sounds: Normal heart sounds. Pulmonary:      Effort: Pulmonary effort is normal.      Breath sounds: Normal breath sounds. Abdominal:      General: Abdomen is flat. Palpations: Abdomen is soft. There is no mass. Tenderness: There is no abdominal tenderness. Musculoskeletal:      Cervical back: No rigidity. Comments: Right shoulder nonspecific tenderness without deformity    Neurovascular sensation found to be intact  Hips and pelvis are without instability or tenderness    Bilateral knees are without any open wounds    Right knee well-healed surgical incisional scar nonspecific tenderness without joint effusion    Left knee nonspecific tenderness joint effusion without ecchymosis    Neurovascular sensation found to be intact   Skin:     General: Skin is warm. Capillary Refill: Capillary refill takes less than 2 seconds. Findings: No lesion or rash. Neurological:      General: No focal deficit present. Mental Status: She is alert and oriented to person, place, and time. Cranial Nerves: No cranial nerve deficit. Motor: No weakness. DIAGNOSTIC RESULTS     EKG: All EKG's are interpreted by the Emergency Department Physician who either signs or Co-signs this chart in the absence of a cardiologist.      RADIOLOGY:   Non-plain film images such as CT, Ultrasound and MRI are read by the radiologist. Plain radiographic images are visualized and preliminarily interpreted by the emergency physician with the below findings:      Interpretation per the Radiologist below, if available at the time of this note:    CT CSpine W/O Contrast   Final Result   No acute intracranial abnormality. No acute abnormality of the cervical spine. CT Head W/O Contrast   Final Result   No acute intracranial abnormality. No acute abnormality of the cervical spine. XR KNEE LEFT (3 VIEWS)   Final Result   Right knee:      1.  Prior right knee arthroplasty and patellar resurfacing. 2. No significant periprosthetic lucency or acute osseous abnormality. Left knee:      1. Very small joint effusion. 2. Tricompartmental osteoarthrosis. CPPD. Moderate narrowing of the lateral   compartment. 3. No acute fracture or dislocation. Right shoulder:      1. Diffuse osteopenia and mild degenerative changes as above. 2. No acute fracture or dislocation. XR KNEE RIGHT (3 VIEWS)   Final Result   Right knee:      1. Prior right knee arthroplasty and patellar resurfacing. 2. No significant periprosthetic lucency or acute osseous abnormality. Left knee:      1. Very small joint effusion. 2. Tricompartmental osteoarthrosis. CPPD. Moderate narrowing of the lateral   compartment. 3. No acute fracture or dislocation. Right shoulder:      1. Diffuse osteopenia and mild degenerative changes as above. 2. No acute fracture or dislocation. XR SHOULDER RIGHT (MIN 2 VIEWS)   Final Result   Right knee:      1. Prior right knee arthroplasty and patellar resurfacing. 2. No significant periprosthetic lucency or acute osseous abnormality. Left knee:      1. Very small joint effusion. 2. Tricompartmental osteoarthrosis. CPPD. Moderate narrowing of the lateral   compartment. 3. No acute fracture or dislocation. Right shoulder:      1. Diffuse osteopenia and mild degenerative changes as above. 2. No acute fracture or dislocation.                ED BEDSIDE ULTRASOUND:   Performed by ED Physician - none    LABS:  Labs Reviewed   CBC WITH AUTO DIFFERENTIAL - Abnormal; Notable for the following components:       Result Value    Seg Neutrophils 69 (*)     Lymphocytes 16 (*)     Immature Granulocytes 1 (*)     All other components within normal limits   BASIC METABOLIC PANEL - Abnormal; Notable for the following components:    Bun/Cre Ratio 25 (*)     All other components within normal limits   PROTIME-INR   APTT       All other labs were within normal range or not returned as of this dictation. EMERGENCY DEPARTMENT COURSE and DIFFERENTIAL DIAGNOSIS/MDM:   Vitals:    Vitals:    09/30/22 1131 09/30/22 1132   BP:  (!) 146/103   Pulse:  83   Resp:  20   SpO2:  99%   Weight: 210 lb (95.3 kg)    Height: 5' 3\" (1.6 m)          MDM  Number of Diagnoses or Management Options  Closed head injury, initial encounter  Contusion of knee, unspecified laterality, initial encounter  Contusion of multiple sites of right shoulder and upper arm, initial encounter  Strain of neck muscle, initial encounter  Diagnosis management comments: 78-year-old female presents emergency department after sustaining mechanical fall. 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   She is encouraged to follow-up with Coumadin clinic for repeat INRs and initiate dosage as noted in the disposition    INR was reviewed and the patient has not taken her Coumadin for 2 days prior-this is a shared decision that the patient will take one half of her daily dose of 7.5 mg this evening and thereafter take her 7.5 mg dose Saturday and Sunday pending on Monday reconnecting with Coumadin clinic concerning further dosage and testing    The patient and the patient's family members who are present acknowledge discharge diagnosis have no additional questions or concerns was released in stable condition          REASSESSMENT   Patient remained hemodynamically stable nontoxic-appearing well oxygenated with a pulse oximeter of 99% on room air    ED Course as of 10/01/22 1830   Fri Sep 30, 2022   1406 Right knee, left knee, right shoulder no acute osseous processes radiologist read [RS]   Sat Oct 01, 2022   1828 Protime-INR:    Prothrombin Time 14.1   INR 1.1 [RS]      ED Course User Index  [RS] Farhat Caal MD         CRITICAL CARE TIME   Total Critical Care time was minutes, excluding separately reportable procedures. There was a high probability of clinically significant/life threatening deterioration in the patient's condition which required my urgent intervention. CONSULTS:  None    PROCEDURES:  Unless otherwise noted below, none     Procedures    FINAL IMPRESSION      1. Closed head injury, initial encounter    2. Strain of neck muscle, initial encounter    3. Contusion of multiple sites of right shoulder and upper arm, initial encounter    4. Contusion of knee, unspecified laterality, initial encounter          DISPOSITION/PLAN   DISPOSITION Decision To Discharge 09/30/2022 02:38:09 PM      PATIENT REFERRED TO:  Pattie Sawant [id number]  [phone]    Call in 3 days      DISCHARGE MEDICATIONS:  Discharge Medication List as of 9/30/2022  2:44 PM        Controlled Substances Monitoring:     RX Monitoring 11/12/2019   Periodic Controlled Substance Monitoring Possible medication side effects, risk of tolerance/dependence & alternative treatments discussed. ;No signs of potential drug abuse or diversion identified. ;Assessed functional status.        (Please note that portions of this note were completed with a voice recognition program.  Efforts were made to edit the dictations but occasionally words are mis-transcribed.)    John Curtis MD (electronically signed)  Attending Emergency Physician           John Curtis MD  10/01/22 7819

## 2022-09-30 NOTE — DISCHARGE INSTRUCTIONS
You may take one half of your 7.5mg Coumadin tablets this evening  Thereafter resume her normal dosages (7.5 mg every evening) and also please contact the Coumadin clinic to affirm this plan    Your INR today was 1.1

## 2022-10-13 ENCOUNTER — HOSPITAL ENCOUNTER (OUTPATIENT)
Dept: PHARMACY | Age: 82
Setting detail: THERAPIES SERIES
Discharge: HOME | End: 2022-10-13
Payer: MEDICARE

## 2022-10-13 VITALS
BODY MASS INDEX: 36.97 KG/M2 | WEIGHT: 208.7 LBS | SYSTOLIC BLOOD PRESSURE: 161 MMHG | HEART RATE: 71 BPM | DIASTOLIC BLOOD PRESSURE: 83 MMHG

## 2022-10-13 DIAGNOSIS — Z79.01 LONG TERM CURRENT USE OF ANTICOAGULANT THERAPY: Primary | ICD-10-CM

## 2022-10-13 LAB — INR BLD: 3.1

## 2022-10-13 PROCEDURE — 85610 PROTHROMBIN TIME: CPT | Performed by: FAMILY MEDICINE

## 2022-10-13 PROCEDURE — 99211 OFF/OP EST MAY X REQ PHY/QHP: CPT | Performed by: FAMILY MEDICINE

## 2022-10-13 NOTE — PROGRESS NOTES
[address]/[place]  Medication Management  ANTICOAGULATION    Referring Provider: Sánchez Garcia CNP    GOAL INR: 2.0-3.0    TODAY'S INR: 3.1    WARFARIN Dosage: hold x1 then resume previously stable dosing on 7.5mg  daily    INR (no units)   Date Value   10/13/2022 3.1   2022 1.1   2022 3.1   07/15/2022 1.4   2022 1.9   2022 1.6   2022 1.9       Medication changes:    No changes. Removed Wegovy from medication list-patient not purchase due to cost  Notes:    Fingerstick INR drawn per clinic protocol. Patient states no visible blood in urine and no black tarry stool. Denies any missed doses of warfarin. No change in other maintenance medications or in diet. Will recheck INR in 5 weeks. Patient will be traveling to [address] [place] [address] through end of January. After patient's last visit in Coumadin clinic, patient fell while walking down hallway. Patient was seen in ER at that time and full assessment. Patient will hold warfarin today then continue 7.5mg daily. Patient acknowledges working in consult agreement with pharmacist as referred by his/her physician.                   For Pharmacy Admin Tracking Only    Intervention Detail: Adherence Monitorin and Dose Adjustment: 1, reason: Therapy De-escalation  Total # of Interventions Recommended: 3  Total # of Interventions Accepted: 3  Time Spent (min): 20    Consuelo Davalos R.Ph., 10/13/2022,11:23 AM

## 2022-11-07 ENCOUNTER — HOSPITAL ENCOUNTER (OUTPATIENT)
Age: 82
Discharge: HOME | End: 2022-11-07
Payer: MEDICARE

## 2022-11-07 DIAGNOSIS — E78.2 MIXED HYPERLIPIDEMIA: ICD-10-CM

## 2022-11-07 DIAGNOSIS — R73.01 IMPAIRED FASTING GLUCOSE: ICD-10-CM

## 2022-11-07 LAB
ABSOLUTE EOS #: 0.22 K/UL (ref 0–0.44)
ABSOLUTE IMMATURE GRANULOCYTE: 0.04 K/UL (ref 0–0.3)
ABSOLUTE LYMPH #: 0.8 K/UL (ref 1.1–3.7)
ABSOLUTE MONO #: 0.57 K/UL (ref 0.1–1.2)
ALT SERPL-CCNC: 13 U/L (ref 5–33)
ANION GAP SERPL CALCULATED.3IONS-SCNC: 11 MMOL/L (ref 9–17)
AST SERPL-CCNC: 13 U/L
BASOPHILS # BLD: 1 % (ref 0–2)
BASOPHILS ABSOLUTE: 0.07 K/UL (ref 0–0.2)
BUN BLDV-MCNC: 16 MG/DL (ref 8–23)
BUN/CREAT BLD: 19 (ref 9–20)
CALCIUM SERPL-MCNC: 9 MG/DL (ref 8.6–10.4)
CHLORIDE BLD-SCNC: 103 MMOL/L (ref 98–107)
CHOLESTEROL/HDL RATIO: 4.9
CHOLESTEROL: 249 MG/DL
CO2: 23 MMOL/L (ref 20–31)
CREAT SERPL-MCNC: 0.84 MG/DL (ref 0.5–0.9)
EOSINOPHILS RELATIVE PERCENT: 3 % (ref 1–4)
GFR SERPL CREATININE-BSD FRML MDRD: >60 ML/MIN/1.73M2
GLUCOSE BLD-MCNC: 130 MG/DL (ref 70–99)
HCT VFR BLD CALC: 45 % (ref 36.3–47.1)
HDLC SERPL-MCNC: 51 MG/DL
HEMOGLOBIN: 14.1 G/DL (ref 11.9–15.1)
IMMATURE GRANULOCYTES: 1 %
LDL CHOLESTEROL: 152 MG/DL (ref 0–130)
LYMPHOCYTES # BLD: 12 % (ref 24–43)
MCH RBC QN AUTO: 30.1 PG (ref 25.2–33.5)
MCHC RBC AUTO-ENTMCNC: 31.3 G/DL (ref 28.4–34.8)
MCV RBC AUTO: 95.9 FL (ref 82.6–102.9)
MONOCYTES # BLD: 9 % (ref 3–12)
NRBC AUTOMATED: 0 PER 100 WBC
PDW BLD-RTO: 13.5 % (ref 11.8–14.4)
PLATELET # BLD: 235 K/UL (ref 138–453)
PMV BLD AUTO: 9.4 FL (ref 8.1–13.5)
POTASSIUM SERPL-SCNC: 4.2 MMOL/L (ref 3.7–5.3)
RBC # BLD: 4.69 M/UL (ref 3.95–5.11)
SEG NEUTROPHILS: 74 % (ref 36–65)
SEGMENTED NEUTROPHILS ABSOLUTE COUNT: 4.92 K/UL (ref 1.5–8.1)
SODIUM BLD-SCNC: 137 MMOL/L (ref 135–144)
TRIGL SERPL-MCNC: 229 MG/DL
WBC # BLD: 6.6 K/UL (ref 3.5–11.3)

## 2022-11-07 PROCEDURE — 83036 HEMOGLOBIN GLYCOSYLATED A1C: CPT

## 2022-11-07 PROCEDURE — 85025 COMPLETE CBC W/AUTO DIFF WBC: CPT

## 2022-11-07 PROCEDURE — 36415 COLL VENOUS BLD VENIPUNCTURE: CPT

## 2022-11-07 PROCEDURE — 84460 ALANINE AMINO (ALT) (SGPT): CPT

## 2022-11-07 PROCEDURE — 84450 TRANSFERASE (AST) (SGOT): CPT

## 2022-11-07 PROCEDURE — 80061 LIPID PANEL: CPT

## 2022-11-07 PROCEDURE — 80048 BASIC METABOLIC PNL TOTAL CA: CPT

## 2022-11-08 LAB
ESTIMATED AVERAGE GLUCOSE: 143 MG/DL
HBA1C MFR BLD: 6.6 % (ref 4–6)

## 2022-11-16 ENCOUNTER — OFFICE VISIT (OUTPATIENT)
Dept: GYNECOLOGIC ONCOLOGY | Age: 82
End: 2022-11-16
Payer: MEDICARE

## 2022-11-16 VITALS
HEART RATE: 77 BPM | WEIGHT: 211 LBS | OXYGEN SATURATION: 97 % | DIASTOLIC BLOOD PRESSURE: 80 MMHG | HEIGHT: 64 IN | SYSTOLIC BLOOD PRESSURE: 120 MMHG | TEMPERATURE: 97.2 F | BODY MASS INDEX: 36.02 KG/M2

## 2022-11-16 DIAGNOSIS — C56.3 MALIGNANT NEOPLASM OF BILATERAL OVARIES (HCC): ICD-10-CM

## 2022-11-16 DIAGNOSIS — N94.89 ADNEXAL MASS: ICD-10-CM

## 2022-11-16 DIAGNOSIS — R19.00 PELVIC MASS: Primary | ICD-10-CM

## 2022-11-16 PROCEDURE — 1123F ACP DISCUSS/DSCN MKR DOCD: CPT | Performed by: OBSTETRICS & GYNECOLOGY

## 2022-11-16 PROCEDURE — 99213 OFFICE O/P EST LOW 20 MIN: CPT | Performed by: OBSTETRICS & GYNECOLOGY

## 2022-11-16 ASSESSMENT — ENCOUNTER SYMPTOMS
CONSTIPATION: 1
BACK PAIN: 0
COUGH: 1

## 2022-11-16 NOTE — PROGRESS NOTES
Review of Systems   Constitutional: Negative. HENT:   Negative for lump/mass. Respiratory:  Positive for cough. Gastrointestinal:  Positive for constipation. Genitourinary: Negative. Musculoskeletal:  Positive for gait problem (uses aburto). Negative for back pain and flank pain. Skin: Negative. Neurological:  Positive for gait problem (uses aburto).

## 2022-11-16 NOTE — PROGRESS NOTES
LAMINECTOMY  10/04/2017    [facility] - L2-3 and partial L4, due to diskitis / ostemyelitis of the lumbar spine    OVARIAN CYST REMOVAL  1976    [place] INCISE FINGER TENDON SHEATH Right 2018    Dr. Pelon Lee - trigger finger release right index, middle and ring fingers    TONSILLECTOMY  1950    TOTAL KNEE ARTHROPLASTY Right 2017    Dr. Jeyson Martins in [address]  2016    Dr. Elliot Wall - robot assisted laproscopic ventral hernia repair with mesh       Family History   Problem Relation Age of Onset    Colon Cancer Father 48    Other Father         gastric ulcers    Ovarian Cancer Mother 80    Cancer Mother     Colon Cancer Brother 79    Other Brother 54        viral endocarditis -  at 54 on heart transplant list    Deep Vein Thrombosis Brother         factor V Leiden - on lifelong warfarin    Other Brother 7        Mitochondrial myopathy and Chronic Progressive External Ophthalmoplegia dx'd at age 9 (he turned 79 in 2018)    Other Brother         AAA       Social History     Socioeconomic History    Marital status:       Spouse name: None    Number of children: None    Years of education: None    Highest education level: None   Tobacco Use    Smoking status: Former     Packs/day: 1.00     Years: 17.00     Pack years: 17.00     Types: Cigarettes     Quit date: 1979     Years since quittin.9    Smokeless tobacco: Never   Vaping Use    Vaping Use: Never used   Substance and Sexual Activity    Alcohol use: No    Drug use: No    Sexual activity: Not Currently     Social Determinants of Health     Financial Resource Strain: Low Risk     Difficulty of Paying Living Expenses: Not hard at all   Food Insecurity: No Food Insecurity    Worried About Running Out of Food in the Last Year: Never true    Ran Out of Food in the Last Year: Never true   Physical Activity: Unknown    Days of Exercise per Week: 2 days       Past [address] does contribute to today's presenting complaint. Current Outpatient Medications   Medication Sig Dispense Refill    clotrimazole-betamethasone (LOTRISONE) 1-0.05 % cream APPLY TOPICALLY TWO TIMES A DAY AS NEEDED FOR SKIN RASH. NOT TO EXCEED 6 WEEKS OF CONTINUOUS USAGE 45 g 1    warfarin (COUMADIN) 7.5 MG tablet TAKE ONE TABLET BY MOUTH DAILY 90 tablet 3    RESTASIS 0.05 % ophthalmic emulsion Place into both eyes daily      nystatin (MYCOSTATIN) 485942 UNIT/GM powder Apply 3 times daily as needed for rash. 1 each 5    sertraline (ZOLOFT) 50 MG tablet Take 1 tablet by mouth daily Along with 100 mg for total daily dose 150 mg QD 90 tablet 3    sertraline (ZOLOFT) 100 MG tablet Take 1 tablet by mouth daily (take along with 50 mg tablet for total daily dose of 150 mg daily) 90 tablet 3    ezetimibe (ZETIA) 10 MG tablet Take 1 tablet by mouth daily 30 tablet 5    Cholecalciferol (VITAMIN D3) 1.25 MG (90030 UT) CAPS Take 1 capsule by mouth daily       vitamin B-12 (CYANOCOBALAMIN) 100 MCG tablet Take 100 mcg by mouth daily       acetaminophen (TYLENOL) 500 MG tablet Take 500 mg by mouth every 4 hours as needed       Coenzyme Q10 (COQ-10) 100 MG CAPS Take 100 mg by mouth daily       ondansetron (ZOFRAN ODT) 4 MG disintegrating tablet Take 1 tablet by mouth every 8 hours as needed for Nausea (Patient not taking: Reported on 11/16/2022) 20 tablet 0     No current facility-administered medications for this visit. Allergies   Allergen Reactions    Latex Rash and Other (See Comments)     redness    Percocet [Oxycodone-Acetaminophen] Itching       Vitals:    11/16/22 1433   Weight: 211 lb (95.7 kg)   Height: 5' 4\" (1.626 m)       Physical Examination:    Abdomen obese and non tender  Lungs clear  CV exam normal today  No adenopathy felt today  No s/s DVT  Vulva and vagina normal  Uterovaginal prolapse, cystocele rectocele noted  5 cm left adnexal cyst, soft, non tender noted again.   This is unchanged      Assessment:         Diagnosis Orders   1. Pelvic mass      US PELVIS COMPLETE      2. Malignant neoplasm of bilateral ovaries (HCC)         3. Adnexal mass LEFT               Plan:     No follow-ups on file. Orders Placed This Encounter   Procedures    US PELVIS COMPLETE           No orders of the defined types were placed in this encounter. Electronically signed by Saintclair Collar, DO on 11/16/2022 at 2:53 PM    Gyn Oncology Attending Note    Patient seen and thoroughly evaluated by me today. Chart, notes, records, imaging reviewed. Labs reviewed. She has a cystocele, rectocele and some uterovaginal prolapse. This is symptomatic for her but she does not desire any active intervention for this at this time. I explained surgical and non surgical approaches to managing this trouble for her. Her  level has been elevated but declining over the past 12 months  She has no evidence for ovary/peritoneal cancer. I explained this to her today. I ordered repeat imaging and tumor marker test for her today. I will call her in the weeks ahead once I see these results. She will return to see me in 6 months. Repeat imaging and tumor marker test prior to next visit. She will call or return sooner as needed if any symptoms or troubles develop.   I spent 20 minutes managing this case in the office today    Paul Armstrong MD

## 2022-11-17 ENCOUNTER — HOSPITAL ENCOUNTER (OUTPATIENT)
Dept: PHARMACY | Age: 82
Setting detail: THERAPIES SERIES
Discharge: HOME | End: 2022-11-17
Payer: MEDICARE

## 2022-11-17 VITALS
WEIGHT: 211 LBS | SYSTOLIC BLOOD PRESSURE: 109 MMHG | DIASTOLIC BLOOD PRESSURE: 69 MMHG | HEART RATE: 78 BPM | BODY MASS INDEX: 36.22 KG/M2

## 2022-11-17 DIAGNOSIS — Z79.01 LONG TERM CURRENT USE OF ANTICOAGULANT THERAPY: Primary | ICD-10-CM

## 2022-11-17 LAB — INR BLD: 3.8

## 2022-11-17 PROCEDURE — 85610 PROTHROMBIN TIME: CPT

## 2022-11-17 PROCEDURE — 99212 OFFICE O/P EST SF 10 MIN: CPT

## 2022-11-17 NOTE — PATIENT INSTRUCTIONS
Continue to monitor urine and stool. Continue to monitor for signs of bleeding. Return to clinic in 4 weeks. Hold warfarin today then decrease warfarin to half tablet for 3.75 mg on Wed and whole 7.5 mg tablet all other days.

## 2022-12-07 ENCOUNTER — HOSPITAL ENCOUNTER (OUTPATIENT)
Dept: ULTRASOUND IMAGING | Age: 82
Discharge: HOME | End: 2022-12-09
Payer: MEDICARE

## 2022-12-07 ENCOUNTER — HOSPITAL ENCOUNTER (OUTPATIENT)
Age: 82
Discharge: HOME | End: 2022-12-07
Payer: MEDICARE

## 2022-12-07 DIAGNOSIS — R19.00 PELVIC MASS: ICD-10-CM

## 2022-12-07 DIAGNOSIS — C56.3 MALIGNANT NEOPLASM OF BILATERAL OVARIES (HCC): ICD-10-CM

## 2022-12-07 DIAGNOSIS — N94.89 ADNEXAL MASS: ICD-10-CM

## 2022-12-07 LAB — CA 125: 49 U/ML

## 2022-12-07 PROCEDURE — 76830 TRANSVAGINAL US NON-OB: CPT

## 2022-12-07 PROCEDURE — 36415 COLL VENOUS BLD VENIPUNCTURE: CPT

## 2022-12-07 PROCEDURE — 86304 IMMUNOASSAY TUMOR CA 125: CPT

## 2022-12-08 NOTE — RESULT ENCOUNTER NOTE
Gyn Oncology Note    Called patient and left her a VM to call back regarding  results    FLORENCIO Brady MD

## 2022-12-16 ENCOUNTER — HOSPITAL ENCOUNTER (OUTPATIENT)
Dept: PHARMACY | Age: 82
Setting detail: THERAPIES SERIES
Discharge: HOME | End: 2022-12-16
Payer: MEDICARE

## 2022-12-16 VITALS
WEIGHT: 211.6 LBS | SYSTOLIC BLOOD PRESSURE: 156 MMHG | HEART RATE: 105 BPM | DIASTOLIC BLOOD PRESSURE: 88 MMHG | BODY MASS INDEX: 36.32 KG/M2

## 2022-12-16 DIAGNOSIS — Z79.01 LONG TERM CURRENT USE OF ANTICOAGULANT THERAPY: Primary | ICD-10-CM

## 2022-12-16 LAB — INR BLD: 1.6

## 2022-12-16 PROCEDURE — 85610 PROTHROMBIN TIME: CPT | Performed by: FAMILY MEDICINE

## 2022-12-16 PROCEDURE — 99212 OFFICE O/P EST SF 10 MIN: CPT | Performed by: FAMILY MEDICINE

## 2022-12-16 NOTE — PROGRESS NOTES
[facility]/[place]  Medication Management  ANTICOAGULATION    Referring Provider: Gema Cunningham CNP    GOAL INR: 2.0-3.0    TODAY'S INR: 1.6    WARFARIN Dosage: 11.25mg x2 then 3.75mg W, 7.5mg all other days    INR (no units)   Date Value   2022 1.6   2022 3.8   10/13/2022 3.1   2022 1.1   2022 3.1   07/15/2022 1.4   2022 1.9       Medication changes:  No changes  Notes:    Fingerstick INR drawn per clinic protocol. Patient states no visible blood in urine and no black tarry stool. Denies any missed doses of warfarin. No change in other maintenance medications or in diet. Will recheck INR in 6 weeks when returns from winter travels. Patient missed dose of warfarin yesterday. Patient acknowledges working in consult agreement with pharmacist as referred by his/her physician.                   For Pharmacy Admin Tracking Only    Intervention Detail: Adherence Monitorin and Dose Adjustment: 2, reason: Therapy Optimization  Total # of Interventions Recommended: 4  Total # of Interventions Accepted: 4  Time Spent (min): 223 [facility], R.Zain., 2022,3:18 PM

## 2023-02-02 ENCOUNTER — APPOINTMENT (OUTPATIENT)
Dept: PHARMACY | Age: 83
End: 2023-02-02
Payer: MEDICARE

## 2023-02-09 ENCOUNTER — HOSPITAL ENCOUNTER (OUTPATIENT)
Dept: PHARMACY | Age: 83
Setting detail: THERAPIES SERIES
Discharge: HOME | End: 2023-02-09
Payer: MEDICARE

## 2023-02-09 VITALS
BODY MASS INDEX: 36.29 KG/M2 | DIASTOLIC BLOOD PRESSURE: 79 MMHG | SYSTOLIC BLOOD PRESSURE: 164 MMHG | WEIGHT: 211.4 LBS | HEART RATE: 81 BPM

## 2023-02-09 DIAGNOSIS — Z79.01 LONG TERM CURRENT USE OF ANTICOAGULANT THERAPY: Primary | ICD-10-CM

## 2023-02-09 LAB — INR BLD: 3

## 2023-02-09 PROCEDURE — 99211 OFF/OP EST MAY X REQ PHY/QHP: CPT | Performed by: FAMILY MEDICINE

## 2023-02-09 PROCEDURE — 85610 PROTHROMBIN TIME: CPT | Performed by: FAMILY MEDICINE

## 2023-02-09 NOTE — PROGRESS NOTES
[address]/[place]  Medication Management  ANTICOAGULATION    Referring Provider: Khalif Cooley CNP    GOAL INR: 2.0-3.0    TODAY'S INR: 3.0    WARFARIN Dosage: continue 7.5mg daily    INR (no units)   Date Value   2023 3   2022 1.6   2022 3.8   10/13/2022 3.1   2022 1.1   2022 3.1   07/15/2022 1.4       Medication changes:  No changes  Notes:    Fingerstick INR drawn per clinic protocol. Patient states no visible blood in urine and no black tarry stool. Denies any missed doses of warfarin. No change in other maintenance medications or in diet. Will recheck INR in 6 weeks. Patient acknowledges working in consult agreement with pharmacist as referred by his/her physician.                   For Pharmacy Admin Tracking Only    Intervention Detail: Adherence Monitorin  Total # of Interventions Recommended: 2  Total # of Interventions Accepted: 2  Time Spent (min): 20    INES LawrencePh., 2023,11:53 AM

## 2023-02-09 NOTE — PATIENT INSTRUCTIONS
Continue to take warfarin 7.5mg (1 tablet) every day. Continue to monitor for signs of bleeding. Return to coumadin clinic in 6 weeks.

## 2023-03-28 ENCOUNTER — HOSPITAL ENCOUNTER (OUTPATIENT)
Dept: PHARMACY | Age: 83
Setting detail: THERAPIES SERIES
Discharge: HOME | End: 2023-03-28
Payer: COMMERCIAL

## 2023-03-28 VITALS
WEIGHT: 203 LBS | DIASTOLIC BLOOD PRESSURE: 77 MMHG | HEART RATE: 80 BPM | BODY MASS INDEX: 34.84 KG/M2 | SYSTOLIC BLOOD PRESSURE: 164 MMHG

## 2023-03-28 DIAGNOSIS — Z79.01 LONG TERM CURRENT USE OF ANTICOAGULANT THERAPY: Primary | ICD-10-CM

## 2023-03-28 LAB — INR BLD: 1.1

## 2023-03-28 PROCEDURE — 85610 PROTHROMBIN TIME: CPT | Performed by: FAMILY MEDICINE

## 2023-03-28 PROCEDURE — 99212 OFFICE O/P EST SF 10 MIN: CPT | Performed by: FAMILY MEDICINE

## 2023-03-28 NOTE — PATIENT INSTRUCTIONS
Please take 15mg (2 tablets) warfarin today then continue 7.5mg (1 tablet) every day. Continue to monitor for signs of bleeding. Return to coumadin clinic in 6 weeks.

## 2023-03-28 NOTE — PROGRESS NOTES
[address]/[place]  Medication Management  ANTICOAGULATION    Referring Provider: Krishna Ferguson CNP    GOAL INR: 2.0-3.0    TODAY'S INR: 1.1    WARFARIN Dosage: 15mg x1 then 7.5mg daily    INR (no units)   Date Value   2023 1.1   2023 3   2022 1.6   2022 3.8   10/13/2022 3.1   2022 1.1   2022 3.1       Medication changes:  No changes  Notes:    Fingerstick INR drawn per clinic protocol. Patient states no visible blood in urine and no black tarry stool. Denies any missed doses of warfarin. No change in other maintenance medications or in diet. Will recheck INR in 6 weeks per patient request. Patient states she did not take the time to put her warfarin in pillminder container and knows she missed \"I don't know how many but probably a bunch\". Patient states she will take the time to make sure she has added warfarin to her containers and \"know(s) it's important\". Patient is having 3rd dose of Wegovy injection following this appt. Patient has lost 8 lbs since last visit. Patient acknowledges working in consult agreement with pharmacist as referred by his/her physician.                   For Pharmacy Admin Tracking Only    Intervention Detail: Adherence Monitorin and Dose Adjustment: 2, reason: Therapy Optimization  Total # of Interventions Recommended: 4  Total # of Interventions Accepted: 4  Time Spent (min): 30      Jeannette Sheffield R.Ph., 3/28/2023,11:31 AM

## 2023-05-14 ENCOUNTER — HOSPITAL ENCOUNTER (OUTPATIENT)
Age: 83
Discharge: HOME | End: 2023-05-14
Payer: COMMERCIAL

## 2023-05-14 DIAGNOSIS — N83.209 CYST OF OVARY, UNSPECIFIED LATERALITY: ICD-10-CM

## 2023-05-14 DIAGNOSIS — E78.2 MIXED HYPERLIPIDEMIA: ICD-10-CM

## 2023-05-14 DIAGNOSIS — R97.1 ELEVATED CANCER ANTIGEN 125 (CA 125): ICD-10-CM

## 2023-05-14 DIAGNOSIS — R73.01 IMPAIRED FASTING GLUCOSE: ICD-10-CM

## 2023-05-14 LAB
ABSOLUTE EOS #: 0.36 K/UL (ref 0–0.44)
ABSOLUTE IMMATURE GRANULOCYTE: 0.04 K/UL (ref 0–0.3)
ABSOLUTE LYMPH #: 1.21 K/UL (ref 1.1–3.7)
ABSOLUTE MONO #: 0.75 K/UL (ref 0.1–1.2)
ALT SERPL-CCNC: 14 U/L (ref 5–33)
ANION GAP SERPL CALCULATED.3IONS-SCNC: 9 MMOL/L (ref 9–17)
AST SERPL-CCNC: 15 U/L
BASOPHILS # BLD: 1 % (ref 0–2)
BASOPHILS ABSOLUTE: 0.09 K/UL (ref 0–0.2)
BUN SERPL-MCNC: 17 MG/DL (ref 8–23)
BUN/CREAT BLD: 19 (ref 9–20)
CALCIUM SERPL-MCNC: 9.1 MG/DL (ref 8.6–10.4)
CANCER AG125 SERPL-ACNC: 40 U/ML
CHLORIDE SERPL-SCNC: 107 MMOL/L (ref 98–107)
CHOLEST SERPL-MCNC: 251 MG/DL
CHOLESTEROL/HDL RATIO: 5.3
CO2 SERPL-SCNC: 24 MMOL/L (ref 20–31)
CREAT SERPL-MCNC: 0.89 MG/DL (ref 0.5–0.9)
EOSINOPHILS RELATIVE PERCENT: 5 % (ref 1–4)
GFR SERPL CREATININE-BSD FRML MDRD: >60 ML/MIN/1.73M2
GLUCOSE SERPL-MCNC: 107 MG/DL (ref 70–99)
HCT VFR BLD AUTO: 45.1 % (ref 36.3–47.1)
HDLC SERPL-MCNC: 47 MG/DL
HGB BLD-MCNC: 14.6 G/DL (ref 11.9–15.1)
IMMATURE GRANULOCYTES: 1 %
LDLC SERPL CALC-MCNC: 170 MG/DL (ref 0–130)
LYMPHOCYTES # BLD: 16 % (ref 24–43)
MCH RBC QN AUTO: 29.9 PG (ref 25.2–33.5)
MCHC RBC AUTO-ENTMCNC: 32.4 G/DL (ref 28.4–34.8)
MCV RBC AUTO: 92.2 FL (ref 82.6–102.9)
MONOCYTES # BLD: 10 % (ref 3–12)
NRBC AUTOMATED: 0 PER 100 WBC
PDW BLD-RTO: 13.5 % (ref 11.8–14.4)
PLATELET # BLD AUTO: 273 K/UL (ref 138–453)
PMV BLD AUTO: 9.1 FL (ref 8.1–13.5)
POTASSIUM SERPL-SCNC: 4.4 MMOL/L (ref 3.7–5.3)
RBC # BLD: 4.89 M/UL (ref 3.95–5.11)
SEG NEUTROPHILS: 67 % (ref 36–65)
SEGMENTED NEUTROPHILS ABSOLUTE COUNT: 5.38 K/UL (ref 1.5–8.1)
SODIUM SERPL-SCNC: 140 MMOL/L (ref 135–144)
TRIGL SERPL-MCNC: 171 MG/DL
WBC # BLD AUTO: 7.8 K/UL (ref 3.5–11.3)

## 2023-05-14 PROCEDURE — 85025 COMPLETE CBC W/AUTO DIFF WBC: CPT

## 2023-05-14 PROCEDURE — 84460 ALANINE AMINO (ALT) (SGPT): CPT

## 2023-05-14 PROCEDURE — 80048 BASIC METABOLIC PNL TOTAL CA: CPT

## 2023-05-14 PROCEDURE — 83036 HEMOGLOBIN GLYCOSYLATED A1C: CPT

## 2023-05-14 PROCEDURE — 80061 LIPID PANEL: CPT

## 2023-05-14 PROCEDURE — 84450 TRANSFERASE (AST) (SGOT): CPT

## 2023-05-14 PROCEDURE — 36415 COLL VENOUS BLD VENIPUNCTURE: CPT

## 2023-05-14 PROCEDURE — 86304 IMMUNOASSAY TUMOR CA 125: CPT

## 2023-05-15 ENCOUNTER — HOSPITAL ENCOUNTER (OUTPATIENT)
Dept: ULTRASOUND IMAGING | Age: 83
Discharge: HOME | End: 2023-05-17
Payer: COMMERCIAL

## 2023-05-15 ENCOUNTER — HOSPITAL ENCOUNTER (OUTPATIENT)
Dept: PHARMACY | Age: 83
Setting detail: THERAPIES SERIES
Discharge: HOME | End: 2023-05-15
Payer: COMMERCIAL

## 2023-05-15 DIAGNOSIS — Z79.01 LONG TERM CURRENT USE OF ANTICOAGULANT THERAPY: Primary | Chronic | ICD-10-CM

## 2023-05-15 DIAGNOSIS — R19.00 PELVIC MASS: ICD-10-CM

## 2023-05-15 LAB
EST. AVERAGE GLUCOSE BLD GHB EST-MCNC: 114 MG/DL
HBA1C MFR BLD: 5.6 % (ref 4–6)
INR BLD: 1.6

## 2023-05-15 PROCEDURE — 85610 PROTHROMBIN TIME: CPT

## 2023-05-15 PROCEDURE — 76830 TRANSVAGINAL US NON-OB: CPT

## 2023-05-15 PROCEDURE — 93976 VASCULAR STUDY: CPT

## 2023-05-15 PROCEDURE — 99212 OFFICE O/P EST SF 10 MIN: CPT

## 2023-05-15 NOTE — PROGRESS NOTES
[facility] [address] [place] [address]  Medication Management  ANTICOAGULATION    Referring Provider: Eduar Sanders CNP     GOAL INR: 2.0-3.0     TODAY'S INR: 1.6     WARFARIN Dosage: Take warfarin 2 tablets for 15 mg today then continue 7.5 mg daily. INR (no units)   Date Value   2023 1.1   2023 3   2022 1.6   2022 3.8   10/13/2022 3.1   2022 1.1   2022 3.1       Hemoglobin   Date Value Ref Range Status   2023 14.6 11.9 - 15.1 g/dL Final     Hematocrit   Date Value Ref Range Status   2023 45.1 36.3 - 47.1 % Final     ALT   Date Value Ref Range Status   2023 14 5 - 33 U/L Final     AST   Date Value Ref Range Status   2023 15 <32 U/L Final       Medication changes:  none    Notes:    Fingerstick INR drawn per clinic protocol. Patient states no visible blood in urine and no black tarry stool. Denies any missed doses of warfarin. No change in other maintenance medications or in diet. Will recheck INR in 1 week. Patient acknowledges working in consult agreement with pharmacist as referred by his/her physician. Patient is finishing the remodel in her kitchen on [address] [facility]. Patient has her place rented next week so she will be in [place].                For Pharmacy Admin Tracking Only    Intervention Detail: Adherence Monitorin and Dose Adjustment: 1, reason: Therapy Optimization  Total # of Interventions Recommended: 1  Total # of Interventions Accepted: 1  Time Spent (min): Zechariah Valdez [address], [facility], PharmD

## 2023-05-15 NOTE — PATIENT INSTRUCTIONS
Continue to monitor urine and stool for signs and symptoms of bleeding. Please notify the clinic of any medication changes. Please remember to bring all medications (both prescription and OTC) to your next visit. Kindly notify the clinic if you are unable to make to your next appointment. Continue current dose of warfarin as instructed on dosing calendar provided.    Daniel - semiezio

## 2023-05-23 ENCOUNTER — APPOINTMENT (OUTPATIENT)
Dept: PHARMACY | Age: 83
End: 2023-05-23
Payer: COMMERCIAL

## 2023-05-23 VITALS — WEIGHT: 194 LBS | BODY MASS INDEX: 33.3 KG/M2

## 2023-05-23 DIAGNOSIS — Z79.01 LONG TERM CURRENT USE OF ANTICOAGULANT THERAPY: Primary | Chronic | ICD-10-CM

## 2023-05-23 PROBLEM — C56.3 MALIGNANT NEOPLASM OF BILATERAL OVARIES (HCC): Status: ACTIVE | Noted: 2023-05-23

## 2023-05-23 PROBLEM — F33.0 MAJOR DEPRESSIVE DISORDER, RECURRENT, MILD (HCC): Status: ACTIVE | Noted: 2023-05-23

## 2023-05-23 LAB — INR BLD: 2.6

## 2023-05-23 PROCEDURE — 99211 OFF/OP EST MAY X REQ PHY/QHP: CPT

## 2023-05-23 PROCEDURE — 85610 PROTHROMBIN TIME: CPT

## 2023-05-23 NOTE — PROGRESS NOTES
[facility] [address] [place] [address]/[place]  Medication Management  ANTICOAGULATION    Referring Provider: Pa David CNP     GOAL INR: 2.0-3.0     TODAY'S INR: 2.6     WARFARIN Dosage: Continue warfarin 7.5 mg po daily    INR (no units)   Date Value   2023 2.6   05/15/2023 1.6   2023 1.1   2023 3   2022 1.6   2022 3.8   10/13/2022 3.1       Hemoglobin   Date Value Ref Range Status   2023 14.6 11.9 - 15.1 g/dL Final     Hematocrit   Date Value Ref Range Status   2023 45.1 36.3 - 47.1 % Final     ALT   Date Value Ref Range Status   2023 14 5 - 33 U/L Final     AST   Date Value Ref Range Status   2023 15 <32 U/L Final       Medication changes:  No change    Notes:    Fingerstick INR drawn per clinic protocol. Patient states no visible blood in urine and no black tarry stool. Denies any missed doses of warfarin. No change in other maintenance medications or in diet. Will recheck INR in 6 weeks. Patient acknowledges working in consult agreement with pharmacist as referred by his/her physician. For Pharmacy Admin Tracking Only    Intervention Detail: Adherence Monitorin  Total # of Interventions Recommended: 1  Total # of Interventions Accepted: 1  Time Spent (min): [address] [facility] [place].  [place], [address] [facility]

## 2023-07-14 ENCOUNTER — HOSPITAL ENCOUNTER (OUTPATIENT)
Dept: PHARMACY | Age: 83
Setting detail: THERAPIES SERIES
Discharge: HOME | End: 2023-07-14
Payer: COMMERCIAL

## 2023-07-14 VITALS
SYSTOLIC BLOOD PRESSURE: 129 MMHG | DIASTOLIC BLOOD PRESSURE: 75 MMHG | WEIGHT: 188 LBS | HEART RATE: 96 BPM | BODY MASS INDEX: 32.27 KG/M2

## 2023-07-14 DIAGNOSIS — Z79.01 LONG TERM CURRENT USE OF ANTICOAGULANT THERAPY: Primary | Chronic | ICD-10-CM

## 2023-07-14 LAB — INR BLD: 2.2

## 2023-07-14 PROCEDURE — 85610 PROTHROMBIN TIME: CPT

## 2023-07-14 PROCEDURE — 99211 OFF/OP EST MAY X REQ PHY/QHP: CPT

## 2023-07-14 RX ORDER — CLOTRIMAZOLE AND BETAMETHASONE DIPROPIONATE 10; .64 MG/G; MG/G
CREAM TOPICAL
COMMUNITY
Start: 2022-09-19

## 2023-07-14 NOTE — PROGRESS NOTES
[facility]  Medication Management  ANTICOAGULATION    Referring Provider: Christophe Darby CNP    GOAL INR: 2-3    TODAY'S INR: 2.2    WARFARIN Dosage: Continue warfarin 7.5 mg po daily    INR (no units)   Date Value   2023 2.2   2023 2.6   05/15/2023 1.6   2023 1.1   2023 3   2022 1.6   2022 3.8       Hemoglobin   Date Value Ref Range Status   2023 14.6 11.9 - 15.1 g/dL Final     Hematocrit   Date Value Ref Range Status   2023 45.1 36.3 - 47.1 % Final     ALT   Date Value Ref Range Status   2023 14 5 - 33 U/L Final     AST   Date Value Ref Range Status   2023 15 <32 U/L Final       Medication changes:  No changes    Notes:    Fingerstick INR drawn per clinic protocol. Patient states no visible blood in urine and no black tarry stool. Denies any missed doses of warfarin. No change in other maintenance medications or in diet. Will recheck INR in 6 weeks. Patient acknowledges working in consult agreement with pharmacist as referred by his/her physician. For Pharmacy Admin Tracking Only    Intervention Detail: Adherence Monitorin  Total # of Interventions Recommended: 1  Total # of Interventions Accepted: 1  Time Spent (min): [address].  STEVE [facility]

## 2023-08-25 ENCOUNTER — HOSPITAL ENCOUNTER (OUTPATIENT)
Dept: PHARMACY | Age: 83
Setting detail: THERAPIES SERIES
Discharge: HOME | End: 2023-08-25
Payer: COMMERCIAL

## 2023-08-25 VITALS
WEIGHT: 186 LBS | DIASTOLIC BLOOD PRESSURE: 82 MMHG | BODY MASS INDEX: 31.93 KG/M2 | HEART RATE: 74 BPM | SYSTOLIC BLOOD PRESSURE: 124 MMHG

## 2023-08-25 DIAGNOSIS — Z79.01 LONG TERM CURRENT USE OF ANTICOAGULANT THERAPY: Primary | Chronic | ICD-10-CM

## 2023-08-25 LAB — INR BLD: 3

## 2023-08-25 PROCEDURE — 99212 OFFICE O/P EST SF 10 MIN: CPT

## 2023-08-25 PROCEDURE — 85610 PROTHROMBIN TIME: CPT

## 2023-08-25 NOTE — PROGRESS NOTES
[address] [facility]-[place]  Medication Management  ANTICOAGULATION    Referring Provider: Jen Thompson CNP    GOAL INR: 2-3    TODAY'S INR: 3    WARFARIN Dosage: 3.75 mg x 1, then continue warfarin 7.5 mg po daily    INR (no units)   Date Value   2023 3   2023 2.2   2023 2.6   05/15/2023 1.6   2023 1.1   2023 3   2022 1.6       Hemoglobin   Date Value Ref Range Status   2023 14.6 11.9 - 15.1 g/dL Final     Hematocrit   Date Value Ref Range Status   2023 45.1 36.3 - 47.1 % Final     ALT   Date Value Ref Range Status   2023 14 5 - 33 U/L Final     AST   Date Value Ref Range Status   2023 15 <32 U/L Final       Medication changes:  No changes    Notes:    Fingerstick INR drawn per clinic protocol. Patient states no visible blood in urine and no black tarry stool. Denies any missed doses of warfarin. No change in other maintenance medications. Will recheck INR in 6 weeks. Patient acknowledges working in consult agreement with pharmacist as referred by his/her physician. Patient reports that she did not take warfarin last evening because she \"felt off track with her warfarin routine\". Patient has been eating fewer greens recently, but is going to resume her previous eating habits, which includes more salads. For Pharmacy Admin Tracking Only    Intervention Detail: Adherence Monitorin and Dose Adjustment: 1, reason: Therapy Optimization  Total # of Interventions Recommended: 2  Total # of Interventions Accepted: 2  Time Spent (min): [place].  STEVE, [address] [place] [address]

## 2023-08-25 NOTE — PATIENT INSTRUCTIONS
Please take warfarin 3.75 mg today. Continue current dose of warfarin as instructed on dosing calendar provided. Continue to monitor urine and stool for signs and symptoms of bleeding. Please notify the clinic of any medication changes.

## 2023-09-28 ENCOUNTER — OFFICE VISIT (OUTPATIENT)
Dept: GYNECOLOGIC ONCOLOGY | Age: 83
End: 2023-09-28
Payer: COMMERCIAL

## 2023-09-28 VITALS
SYSTOLIC BLOOD PRESSURE: 142 MMHG | BODY MASS INDEX: 33.13 KG/M2 | OXYGEN SATURATION: 95 % | HEART RATE: 94 BPM | DIASTOLIC BLOOD PRESSURE: 81 MMHG | HEIGHT: 63 IN | WEIGHT: 187 LBS

## 2023-09-28 DIAGNOSIS — R19.00 PELVIC MASS IN FEMALE: ICD-10-CM

## 2023-09-28 DIAGNOSIS — E66.3 OVERWEIGHT: ICD-10-CM

## 2023-09-28 DIAGNOSIS — R97.1 ELEVATED CANCER ANTIGEN 125 (CA 125): Primary | ICD-10-CM

## 2023-09-28 PROCEDURE — 99213 OFFICE O/P EST LOW 20 MIN: CPT | Performed by: OBSTETRICS & GYNECOLOGY

## 2023-09-28 PROCEDURE — 1123F ACP DISCUSS/DSCN MKR DOCD: CPT | Performed by: OBSTETRICS & GYNECOLOGY

## 2023-09-28 ASSESSMENT — ENCOUNTER SYMPTOMS
RESPIRATORY NEGATIVE: 1
CONSTIPATION: 1
EYE PROBLEMS: 1

## 2023-09-28 NOTE — PROGRESS NOTES
Review of Systems   Constitutional:  Positive for chills (not new). HENT:  Negative. Eyes:  Positive for eye problems (dry eye). Respiratory: Negative. Cardiovascular: Negative. Gastrointestinal:  Positive for constipation (not new). Endocrine: Negative. Genitourinary:  Positive for nocturia. Musculoskeletal: Negative. Skin: Negative. Neurological: Negative. Hematological:  Bruises/bleeds easily.

## 2023-09-28 NOTE — PROGRESS NOTES
Gyn Oncology Attending Note    Patient presents today for follow up and surveillance. History of slightly elevated  level and likely benign adnexal cyst   level has steadily fallen over the past 12-18 months  Ovarian cyst is unilocular and benign. She is asymptomatic today. No s/s ovary cancer. I revd her chart, notes, labs, imaging and history in detail today  I spent 20 minutes in the office today managing this case.     Exam reveals an overweight elderly woman in no distress  Abdomen non tender, no masses  No adneopathy  No s/s DVT today  Vulva and vagina normal  Persistent ovarian cyst felt again today, stable in size, soft mobile nontender  Uterus and cervix normal  No cancer seen or felt today on thorough examination    Plan:    FU one year or sooner as needed for surveillance of likely benign adnexal cyst  Vaginal sonogram now   level now  Call for results in 2 weeks    FLORENCIO Brady MD

## 2023-09-28 NOTE — PROGRESS NOTES
[address] [facility], [address], [address]  [address] present for her Ovarian Cyst Surveillance    CC/HPI: She has a history of ovarian cyst that has been present since 2021. Patient states she is feeling well and has no gynecologic issues at this time. Last CA-125 was 40 on 5/14/23 which is decreased from 49 on 12/7/22  TVUS on 5/16/23 shows a 3.8 cm left ovarian non neoplastic cyst which has decreased in size from 4.7 cm on 12/7/22. Both were benign appearing on imaging. ROS:  I have personally reviewed and agree with the review of systems done by my ancillary staff in the [address] [place] [address] documentation.       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 Mendoza Flank  2011    Dr. 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 Lucita Lo - trigger finger release right index, middle and ring fingers    TONSILLECTOMY  1950    TOTAL KNEE ARTHROPLASTY Right 08/02/2017    Dr. Thaddeus Wallis in [address] [place] [address]  04/22/2016    Dr. Bismark Jackson - robot assisted

## 2023-09-29 ENCOUNTER — HOSPITAL ENCOUNTER (OUTPATIENT)
Dept: PHARMACY | Age: 83
Setting detail: THERAPIES SERIES
Discharge: HOME | End: 2023-09-29
Payer: COMMERCIAL

## 2023-09-29 VITALS
HEART RATE: 66 BPM | DIASTOLIC BLOOD PRESSURE: 68 MMHG | SYSTOLIC BLOOD PRESSURE: 160 MMHG | WEIGHT: 186 LBS | BODY MASS INDEX: 32.95 KG/M2

## 2023-09-29 DIAGNOSIS — Z79.01 LONG TERM CURRENT USE OF ANTICOAGULANT THERAPY: Primary | Chronic | ICD-10-CM

## 2023-09-29 LAB — INR BLD: 2.1

## 2023-09-29 PROCEDURE — 85610 PROTHROMBIN TIME: CPT | Performed by: FAMILY MEDICINE

## 2023-09-29 PROCEDURE — 99211 OFF/OP EST MAY X REQ PHY/QHP: CPT | Performed by: FAMILY MEDICINE

## 2023-09-29 NOTE — PROGRESS NOTES
[facility]  Medication Management  ANTICOAGULATION    Referring Provider: Dr Floyd Hines INR: 2.0-3.0    TODAY'S INR: 2.1    WARFARIN Dosage: continue 7.5mg daily    INR (no units)   Date Value   2023 2.1   2023 3   2023 2.2   2023 2.6   05/15/2023 1.6   2023 1.1   2023 3       Hemoglobin   Date Value Ref Range Status   2023 14.6 11.9 - 15.1 g/dL Final     Hematocrit   Date Value Ref Range Status   2023 45.1 36.3 - 47.1 % Final     ALT   Date Value Ref Range Status   2023 14 5 - 33 U/L Final     AST   Date Value Ref Range Status   2023 15 <32 U/L Final       Medication changes:  No change    Notes:    Fingerstick INR drawn per clinic protocol. Patient states no visible blood in urine and no black tarry stool. Denies any missed doses of warfarin. No change in other maintenance medications or in diet. Will recheck INR in 6 weeks. Patient acknowledges working in consult agreement with pharmacist as referred by his/her physician.                   For Pharmacy Admin Tracking Only    Intervention Detail: Adherence Monitorin  Total # of Interventions Recommended: 2  Total # of Interventions Accepted: 2  Time Spent (min): 20    INES HandleyPh., 2023,10:03 AM no

## 2023-09-29 NOTE — PATIENT INSTRUCTIONS
Continue to take warfarin 7.5mg (1 tablet) every day. Continue to monitor urine and stool for signs and symptoms of bleeding. Please notify the clinic of any medication changes. Please remember to bring all medications (both prescription and OTC) to your next visit. Kindly notify the clinic if you are unable to make to your next appointment. Continue current dose of warfarin as instructed on dosing calendar provided.

## 2023-11-02 ENCOUNTER — HOSPITAL ENCOUNTER (OUTPATIENT)
Dept: PHARMACY | Age: 83
Setting detail: THERAPIES SERIES
Discharge: HOME | End: 2023-11-02
Payer: COMMERCIAL

## 2023-11-02 VITALS
SYSTOLIC BLOOD PRESSURE: 119 MMHG | BODY MASS INDEX: 32.95 KG/M2 | DIASTOLIC BLOOD PRESSURE: 67 MMHG | WEIGHT: 186 LBS | HEART RATE: 74 BPM

## 2023-11-02 DIAGNOSIS — Z79.01 LONG TERM CURRENT USE OF ANTICOAGULANT THERAPY: Primary | Chronic | ICD-10-CM

## 2023-11-02 LAB — INR BLD: 4.5

## 2023-11-02 PROCEDURE — 99212 OFFICE O/P EST SF 10 MIN: CPT

## 2023-11-02 PROCEDURE — 85610 PROTHROMBIN TIME: CPT

## 2023-11-02 NOTE — PROGRESS NOTES
[address] [facility]-[place]  Medication Management  ANTICOAGULATION    Referring Provider: Dr Guevara Savage INR: 2 - 3     TODAY'S INR: 4.5     WARFARIN Dosage: Hold warfarin today then decrease warfarin to half tablet for 3.75 mg  and whole 7.5 mg tablet all other days. INR (no units)   Date Value   2023 2.1   2023 3   2023 2.2   2023 2.6   05/15/2023 1.6   2023 1.1   2023 3       Hemoglobin   Date Value Ref Range Status   2023 14.6 11.9 - 15.1 g/dL Final     Hematocrit   Date Value Ref Range Status   2023 45.1 36.3 - 47.1 % Final     ALT   Date Value Ref Range Status   2023 14 5 - 33 U/L Final     AST   Date Value Ref Range Status   2023 15 <32 U/L Final       Medication changes:  Has not taken Zetia for a long time but did NOT want me to take it off her list.    Notes:    Fingerstick INR drawn per clinic protocol. Patient states no visible blood in urine and no black tarry stool. Denies any missed doses of warfarin. No change in other maintenance medications or in diet. Will recheck INR in 3 weeks. Patient acknowledges working in consult agreement with pharmacist as referred by his/her physician.                   For Pharmacy Admin Tracking Only    Intervention Detail: Adherence Monitorin and Dose Adjustment: 2, reason: Therapy Optimization  Total # of Interventions Recommended: 2  Total # of Interventions Accepted: 2  Time Spent (min): [address] , [address] [place] [address], PharmD

## 2023-11-02 NOTE — PATIENT INSTRUCTIONS
Continue to monitor urine and stool for signs and symptoms of bleeding. Please notify the clinic of any medication changes. Please remember to bring all medications (both prescription and OTC) to your next visit. Kindly notify the clinic if you are unable to make to your next appointment. Hold warfarin today then decrease dose of warfarin as instructed on dosing calendar provided.

## 2023-11-22 ENCOUNTER — APPOINTMENT (OUTPATIENT)
Dept: PHARMACY | Age: 83
End: 2023-11-22
Payer: COMMERCIAL

## 2023-11-22 VITALS
BODY MASS INDEX: 34.01 KG/M2 | SYSTOLIC BLOOD PRESSURE: 146 MMHG | DIASTOLIC BLOOD PRESSURE: 76 MMHG | WEIGHT: 192 LBS | HEART RATE: 76 BPM

## 2023-11-22 DIAGNOSIS — Z79.01 LONG TERM CURRENT USE OF ANTICOAGULANT THERAPY: Primary | Chronic | ICD-10-CM

## 2023-11-22 LAB — INR BLD: 1.8

## 2023-11-22 PROCEDURE — 99212 OFFICE O/P EST SF 10 MIN: CPT

## 2023-11-22 PROCEDURE — 85610 PROTHROMBIN TIME: CPT

## 2023-11-22 NOTE — PROGRESS NOTES
[address] [facility]-[place]/[place]  Medication Management  ANTICOAGULATION    Referring Provider: Dr. Ector Arnold INR: 2 - 3     TODAY'S INR: 1.8     WARFARIN Dosage: 11.25 mg x 1, then continue warfarin  3.75 mg po every Friday; 7.5 mg po all other days. INR (no units)   Date Value   2023 1.8   2023 4.5   2023 2.1   2023 3   2023 2.2   2023 2.6   05/15/2023 1.6       Hemoglobin   Date Value Ref Range Status   2023 14.6 11.9 - 15.1 g/dL Final     Hematocrit   Date Value Ref Range Status   2023 45.1 36.3 - 47.1 % Final     ALT   Date Value Ref Range Status   2023 14 5 - 33 U/L Final     AST   Date Value Ref Range Status   2023 15 <32 U/L Final       Medication changes:  Has not taken Zetia for a long time but did NOT want me to take it off her list.    Notes:    Fingerstick INR drawn per clinic protocol. Patient states no visible blood in urine and no black tarry stool. No change in other maintenance medications or in diet. Will recheck INR in 4 weeks. Patient acknowledges working in consult agreement with pharmacist as referred by his/her physician. Patient missed one dose of warfarin last evening. For Pharmacy Admin Tracking Only    Intervention Detail: Adherence Monitorin and Dose Adjustment: 1, reason: Improve Adherence, Therapy Optimization  Total # of Interventions Recommended: 2  Total # of Interventions Accepted: 2  Time Spent (min): 111  [facility].  Allie Smith, [facility]

## 2023-11-22 NOTE — PATIENT INSTRUCTIONS
Please take warfarin 11.25 mg today. Continue current dose of warfarin as instructed on dosing calendar provided. Continue to monitor urine and stool for signs and symptoms of bleeding. Please notify the clinic of any medication changes. Kindly notify the clinic if you are unable to make to your next appointment.

## 2024-01-02 ENCOUNTER — HOSPITAL ENCOUNTER (OUTPATIENT)
Age: 84
Discharge: HOME | End: 2024-01-02
Payer: COMMERCIAL

## 2024-01-02 DIAGNOSIS — E78.2 MIXED HYPERLIPIDEMIA: ICD-10-CM

## 2024-01-02 DIAGNOSIS — R73.01 IMPAIRED FASTING GLUCOSE: ICD-10-CM

## 2024-01-02 LAB
ALBUMIN SERPL-MCNC: 4.6 G/DL (ref 3.5–5.2)
ALBUMIN/GLOB SERPL: 1.5 {RATIO} (ref 1–2.5)
ALP SERPL-CCNC: 84 U/L (ref 35–104)
ALT SERPL-CCNC: 12 U/L (ref 5–33)
ANION GAP SERPL CALCULATED.3IONS-SCNC: 10 MMOL/L (ref 9–17)
AST SERPL-CCNC: 13 U/L
BILIRUB SERPL-MCNC: 0.4 MG/DL (ref 0.3–1.2)
BUN SERPL-MCNC: 18 MG/DL (ref 8–23)
BUN/CREAT SERPL: 20 (ref 9–20)
CALCIUM SERPL-MCNC: 9.6 MG/DL (ref 8.6–10.4)
CHLORIDE SERPL-SCNC: 106 MMOL/L (ref 98–107)
CO2 SERPL-SCNC: 26 MMOL/L (ref 20–31)
CREAT SERPL-MCNC: 0.9 MG/DL (ref 0.5–0.9)
ERYTHROCYTE [DISTWIDTH] IN BLOOD BY AUTOMATED COUNT: 13.4 % (ref 11.8–14.4)
EST. AVERAGE GLUCOSE BLD GHB EST-MCNC: 114 MG/DL
GFR SERPL CREATININE-BSD FRML MDRD: >60 ML/MIN/1.73M2
GLUCOSE SERPL-MCNC: 107 MG/DL (ref 70–99)
HBA1C MFR BLD: 5.6 % (ref 4–6)
HCT VFR BLD AUTO: 48.7 % (ref 36.3–47.1)
HGB BLD-MCNC: 15.2 G/DL (ref 11.9–15.1)
MCH RBC QN AUTO: 29.2 PG (ref 25.2–33.5)
MCHC RBC AUTO-ENTMCNC: 31.2 G/DL (ref 28.4–34.8)
MCV RBC AUTO: 93.7 FL (ref 82.6–102.9)
NRBC BLD-RTO: 0 PER 100 WBC
PLATELET # BLD AUTO: 251 K/UL (ref 138–453)
PMV BLD AUTO: 9.3 FL (ref 8.1–13.5)
POTASSIUM SERPL-SCNC: 4.5 MMOL/L (ref 3.7–5.3)
PROT SERPL-MCNC: 7.6 G/DL (ref 6.4–8.3)
RBC # BLD AUTO: 5.2 M/UL (ref 3.95–5.11)
SODIUM SERPL-SCNC: 142 MMOL/L (ref 135–144)
WBC OTHER # BLD: 6.9 K/UL (ref 3.5–11.3)

## 2024-01-02 PROCEDURE — 80061 LIPID PANEL: CPT

## 2024-01-02 PROCEDURE — 85027 COMPLETE CBC AUTOMATED: CPT

## 2024-01-02 PROCEDURE — 36415 COLL VENOUS BLD VENIPUNCTURE: CPT

## 2024-01-02 PROCEDURE — 80053 COMPREHEN METABOLIC PANEL: CPT

## 2024-01-02 PROCEDURE — 83036 HEMOGLOBIN GLYCOSYLATED A1C: CPT

## 2024-01-03 LAB
CHOLEST SERPL-MCNC: 350 MG/DL
CHOLESTEROL/HDL RATIO: 6.1
HDLC SERPL-MCNC: 57 MG/DL
LDLC SERPL CALC-MCNC: 248 MG/DL (ref 0–130)
TRIGL SERPL-MCNC: 227 MG/DL

## 2024-01-22 ENCOUNTER — HOSPITAL ENCOUNTER (OUTPATIENT)
Dept: PHARMACY | Age: 84
Setting detail: THERAPIES SERIES
Discharge: HOME | End: 2024-01-22
Payer: COMMERCIAL

## 2024-01-22 VITALS
BODY MASS INDEX: 33.13 KG/M2 | SYSTOLIC BLOOD PRESSURE: 118 MMHG | DIASTOLIC BLOOD PRESSURE: 68 MMHG | WEIGHT: 187 LBS | HEART RATE: 90 BPM

## 2024-01-22 DIAGNOSIS — Z79.01 LONG TERM CURRENT USE OF ANTICOAGULANT THERAPY: Primary | Chronic | ICD-10-CM

## 2024-01-22 LAB — INR BLD: 1.8

## 2024-01-22 PROCEDURE — 99212 OFFICE O/P EST SF 10 MIN: CPT

## 2024-01-22 PROCEDURE — 85610 PROTHROMBIN TIME: CPT

## 2024-01-22 NOTE — PROGRESS NOTES
[place]  Medication Management  ANTICOAGULATION    Referring Provider: Dr. Streeter     GOAL INR: 2 - 3     TODAY'S INR: 1.8     WARFARIN Dosage: Continue warfarin half tablet for 3.75 mg  and whole 7.5 mg tablet all other days.    INR (no units)   Date Value   2023 3.0   2023 1.8   2023 4.5   2023 2.1   2023 3   2023 2.2   2023 2.6       Hemoglobin   Date Value Ref Range Status   2024 15.2 (H) 11.9 - 15.1 g/dL Final     Hematocrit   Date Value Ref Range Status   2024 48.7 (H) 36.3 - 47.1 % Final     ALT   Date Value Ref Range Status   2024 12 5 - 33 U/L Final     AST   Date Value Ref Range Status   2024 13 <32 U/L Final       Medication changes:  Started Zetia  Started Crestor    Notes:    Fingerstick INR drawn per clinic protocol. Patient states no visible blood in urine and no black tarry stool. Denies any missed doses of warfarin. No change in other maintenance medications or in diet. Will recheck INR in 3 weeks. Patient acknowledges working in consult agreement with pharmacist as referred by his/her physician.            Eating more salads which could decrease INR  Started Crestor which could increase INR         Patient is leaving for [place] on  for 5 weeks.     For Pharmacy Admin Tracking Only    Intervention Detail: Adherence Monitorin and New Rx: 2, reason: Needs Additional Therapy  Total # of Interventions Recommended: 2  Total # of Interventions Accepted: 2  Time Spent (min): 20      Danyell Harris [facility], PharmD

## 2024-01-30 ENCOUNTER — HOSPITAL ENCOUNTER (OUTPATIENT)
Dept: ULTRASOUND IMAGING | Age: 84
Discharge: HOME | End: 2024-02-01
Payer: COMMERCIAL

## 2024-01-30 DIAGNOSIS — R97.1 ELEVATED CANCER ANTIGEN 125 (CA 125): ICD-10-CM

## 2024-01-30 PROCEDURE — 76830 TRANSVAGINAL US NON-OB: CPT

## 2024-02-13 ENCOUNTER — HOSPITAL ENCOUNTER (OUTPATIENT)
Dept: PHARMACY | Age: 84
Setting detail: THERAPIES SERIES
Discharge: HOME | End: 2024-02-13
Payer: COMMERCIAL

## 2024-02-13 VITALS
WEIGHT: 188 LBS | SYSTOLIC BLOOD PRESSURE: 128 MMHG | HEART RATE: 78 BPM | DIASTOLIC BLOOD PRESSURE: 70 MMHG | BODY MASS INDEX: 33.3 KG/M2

## 2024-02-13 DIAGNOSIS — Z79.01 LONG TERM CURRENT USE OF ANTICOAGULANT THERAPY: Primary | Chronic | ICD-10-CM

## 2024-02-13 LAB — INR BLD: 3

## 2024-02-13 PROCEDURE — 99212 OFFICE O/P EST SF 10 MIN: CPT

## 2024-02-13 PROCEDURE — 85610 PROTHROMBIN TIME: CPT

## 2024-02-13 NOTE — PROGRESS NOTES
[facility]  Medication Management  ANTICOAGULATION    Referring Provider: Dr. Streeter     GOAL INR: 2 - 3     TODAY'S INR: 3     WARFARIN Dosage: Decrease warfarin to 3.75 mg po every Monday and Friday; 7.5 mg po all other days    INR (no units)   Date Value   2024 3   2024 1.8   2023 3.0   2023 1.8   2023 4.5   2023 2.1   2023 3       Hemoglobin   Date Value Ref Range Status   2024 15.2 (H) 11.9 - 15.1 g/dL Final     Hematocrit   Date Value Ref Range Status   2024 48.7 (H) 36.3 - 47.1 % Final     ALT   Date Value Ref Range Status   2024 12 5 - 33 U/L Final     AST   Date Value Ref Range Status   2024 13 <32 U/L Final       Medication changes:  Started Zetia  Started Crestor    Notes:    Fingerstick INR drawn per clinic protocol. Patient states no visible blood in urine and no black tarry stool. Denies any missed doses of warfarin. No change in other maintenance medications or in diet. Will recheck INR in 3 weeks. Patient acknowledges working in consult agreement with pharmacist as referred by his/her physician.            Patient is taking Crestor and Zetia which increase INR.          Patient is leaving for [place] on 24 for 5 weeks.   Patient states that she will increase alcohol while in [place] - will decrease warfarin as above and recheck INR when patient returns from vacation.     For Pharmacy Admin Tracking Only    Intervention Detail: Adherence Monitorin and Dose Adjustment: 1, reason: Improve Adherence, Therapy Optimization  Total # of Interventions Recommended: 2  Total # of Interventions Accepted: 2  Time Spent (min): 20       Allison Deng [facility]

## 2024-02-13 NOTE — PATIENT INSTRUCTIONS
Decrease current dose of warfarin as instructed on dosing calendar provided.   Continue to monitor urine and stool for signs and symptoms of bleeding.   Please notify the clinic of any medication changes.   Please remember to bring all medications (both prescription and OTC) to your next visit. Kindly notify the clinic if you are unable to make to your next appointment.

## 2024-03-26 ENCOUNTER — HOSPITAL ENCOUNTER (OUTPATIENT)
Dept: PHARMACY | Age: 84
Setting detail: THERAPIES SERIES
Discharge: HOME | End: 2024-03-26
Payer: COMMERCIAL

## 2024-03-26 VITALS
SYSTOLIC BLOOD PRESSURE: 134 MMHG | DIASTOLIC BLOOD PRESSURE: 63 MMHG | WEIGHT: 189 LBS | BODY MASS INDEX: 33.48 KG/M2 | HEART RATE: 79 BPM

## 2024-03-26 DIAGNOSIS — Z79.01 LONG TERM CURRENT USE OF ANTICOAGULANT THERAPY: Primary | Chronic | ICD-10-CM

## 2024-03-26 LAB — INR BLD: 2.5

## 2024-03-26 PROCEDURE — 99211 OFF/OP EST MAY X REQ PHY/QHP: CPT

## 2024-03-26 PROCEDURE — 85610 PROTHROMBIN TIME: CPT

## 2024-03-26 NOTE — PROGRESS NOTES
[facility]  Medication Management  ANTICOAGULATION    Referring Provider: Dr. Streeter     GOAL INR: 2 - 3     TODAY'S INR: 3     WARFARIN Dosage: Continue 3.75 mg po every Monday and Friday; 7.5 mg po all other days    INR (no units)   Date Value   2024 2.5   2024 3   2024 1.8   2023 3.0   2023 1.8   2023 4.5   2023 2.1       Hemoglobin   Date Value Ref Range Status   2024 15.2 (H) 11.9 - 15.1 g/dL Final     Hematocrit   Date Value Ref Range Status   2024 48.7 (H) 36.3 - 47.1 % Final     ALT   Date Value Ref Range Status   2024 12 5 - 33 U/L Final     AST   Date Value Ref Range Status   2024 13 <32 U/L Final       Medication changes:  No changes    Notes:    Fingerstick INR drawn per clinic protocol. Patient states no visible blood in urine and no black tarry stool. Denies any missed doses of warfarin. No change in other maintenance medications or in diet. Will recheck INR in 6 weeks. Patient acknowledges working in consult agreement with pharmacist as referred by his/her physician.            For Pharmacy Admin Tracking Only    Intervention Detail: Adherence Monitorin  Total # of Interventions Recommended: 1  Total # of Interventions Accepted: 1  Time Spent (min): 20       Allison Deng RP

## 2024-04-10 ENCOUNTER — OFFICE VISIT (OUTPATIENT)
Age: 84
End: 2024-04-10

## 2024-04-10 VITALS
WEIGHT: 187 LBS | DIASTOLIC BLOOD PRESSURE: 78 MMHG | RESPIRATION RATE: 18 BRPM | BODY MASS INDEX: 33.13 KG/M2 | OXYGEN SATURATION: 97 % | SYSTOLIC BLOOD PRESSURE: 106 MMHG

## 2024-04-10 DIAGNOSIS — M17.12 OSTEOARTHRITIS OF LEFT KNEE, UNSPECIFIED OSTEOARTHRITIS TYPE: ICD-10-CM

## 2024-04-10 DIAGNOSIS — M25.562 CHRONIC PAIN OF LEFT KNEE: Primary | ICD-10-CM

## 2024-04-10 DIAGNOSIS — E66.9 CLASS 1 OBESITY WITH BODY MASS INDEX (BMI) OF 33.0 TO 33.9 IN ADULT, UNSPECIFIED OBESITY TYPE, UNSPECIFIED WHETHER SERIOUS COMORBIDITY PRESENT: ICD-10-CM

## 2024-04-10 DIAGNOSIS — G89.29 CHRONIC PAIN OF LEFT KNEE: Primary | ICD-10-CM

## 2024-04-10 RX ORDER — TRIAMCINOLONE ACETONIDE 40 MG/ML
40 INJECTION, SUSPENSION INTRA-ARTICULAR; INTRAMUSCULAR ONCE
Status: SHIPPED | OUTPATIENT
Start: 2024-04-10

## 2024-04-10 RX ORDER — BUPIVACAINE HYDROCHLORIDE 2.5 MG/ML
4 INJECTION, SOLUTION INFILTRATION; PERINEURAL ONCE
Status: SHIPPED | OUTPATIENT
Start: 2024-04-10

## 2024-04-10 NOTE — PROGRESS NOTES
Procedure Performed: Left knee Intra-articular joint injection    Indications: Chronic left knee pain    Risks, benefits, and alternatives of the procedure were reviewed.  All questions were answered appropriately and informed consent, both written and verbal, was obtained.  The patient was placed in the sitting position with knees flexed in a position of comfort.  A timeout was obtained.  Landmarks were palpated for an anterior approach along the lateral joint line.  The area was prepped with alcohol x3 in typical aseptic fashion.  At this point, a 25-gauge needle was passed into the knee atraumatically.  Then, a total of 4 mL of Marcaine 0.25% combined with 40 mg of Kenalog was slowly injected the knee after negative aspiration.  The needle was withdrawn with the point of needle intact.  The procedure was tolerated without sequelae.  Postop vital signs were taken and noted to be stable.  The patient was kept in the office for approximately 10 minutes and left without signs of any immediate sequelae.   
opportunities to enhance analgesia and quality of life have been and will continue to be pursued.    Levy Jacob DO  Interventional Pain Management/PM&R   [facility]    Orders Placed This Encounter    triamcinolone acetonide (KENALOG-40) injection 40 mg    BUPivacaine (MARCAINE) 0.25 % injection 10 mg

## 2024-05-07 ENCOUNTER — HOSPITAL ENCOUNTER (OUTPATIENT)
Dept: PHARMACY | Age: 84
Setting detail: THERAPIES SERIES
Discharge: HOME | End: 2024-05-07
Payer: COMMERCIAL

## 2024-05-07 VITALS
BODY MASS INDEX: 33.37 KG/M2 | DIASTOLIC BLOOD PRESSURE: 67 MMHG | SYSTOLIC BLOOD PRESSURE: 136 MMHG | HEART RATE: 77 BPM | WEIGHT: 188.4 LBS

## 2024-05-07 DIAGNOSIS — Z79.01 LONG TERM CURRENT USE OF ANTICOAGULANT THERAPY: Primary | Chronic | ICD-10-CM

## 2024-05-07 LAB — INR BLD: 2.4

## 2024-05-07 PROCEDURE — 85610 PROTHROMBIN TIME: CPT | Performed by: FAMILY MEDICINE

## 2024-05-07 PROCEDURE — 99211 OFF/OP EST MAY X REQ PHY/QHP: CPT | Performed by: FAMILY MEDICINE

## 2024-05-07 NOTE — PROGRESS NOTES
[facility]  Medication Management  ANTICOAGULATION    Referring Provider: Dr Streeter    GOAL INR: 2.0-3.0    TODAY'S INR: 2.4    WARFARIN Dosage: continue 3.75mg MF, 7.5mg all other days    INR (no units)   Date Value   2024 2.4   2024 2.5   2024 3   2024 1.8   2023 3.0   2023 1.8   2023 4.5       Hemoglobin   Date Value Ref Range Status   2024 15.2 (H) 11.9 - 15.1 g/dL Final     Hematocrit   Date Value Ref Range Status   2024 48.7 (H) 36.3 - 47.1 % Final     ALT   Date Value Ref Range Status   2024 12 5 - 33 U/L Final     AST   Date Value Ref Range Status   2024 13 <32 U/L Final       Medication changes:  No changes    Notes:    Fingerstick INR drawn per clinic protocol. Patient states no visible blood in urine and no black tarry stool. Denies any missed doses of warfarin. No change in other maintenance medications or in diet. Will recheck INR in 6 weeks. Patient had kenalog injection into left knee 4/10/24 by Dr Jacob. States it has given some improvement. Patient states she has been compliant with using pill minder tray and doing well with taking all medications routinely.  Patient acknowledges working in consult agreement with pharmacist as referred by his/her physician.                  For Pharmacy Admin Tracking Only    Intervention Detail: Adherence Monitorin  Total # of Interventions Recommended: 2  Total # of Interventions Accepted: 2  Time Spent (min): 20      Marleny Oro, KAYLEE.Ph., 2024,9:35 AM

## 2024-05-07 NOTE — PATIENT INSTRUCTIONS
Continue to take warfarin 3.75mg (1/2 tablet) Monday and Friday and 7.5mg (1 tablet) all other days.  Continue to monitor urine and stool for signs and symptoms of bleeding.   Please notify the clinic of any medication changes.   Please remember to bring all medications (both prescription and OTC) to your next visit.   Kindly notify the clinic if you are unable to make to your next appointment.   Follow warfarin dosing instructions on dosing calendar provided.

## 2024-05-24 ENCOUNTER — HOSPITAL ENCOUNTER (OUTPATIENT)
Age: 84
Discharge: HOME | End: 2024-05-24
Payer: COMMERCIAL

## 2024-05-24 DIAGNOSIS — D68.51 FACTOR V LEIDEN CARRIER (HCC): ICD-10-CM

## 2024-05-24 DIAGNOSIS — E78.2 MIXED HYPERLIPIDEMIA: ICD-10-CM

## 2024-05-24 DIAGNOSIS — R73.01 IMPAIRED FASTING GLUCOSE: ICD-10-CM

## 2024-05-24 LAB
ALBUMIN SERPL-MCNC: 4.4 G/DL (ref 3.5–5.2)
ALBUMIN/GLOB SERPL: 1.5 {RATIO} (ref 1–2.5)
ALP SERPL-CCNC: 73 U/L (ref 35–104)
ALT SERPL-CCNC: 13 U/L (ref 5–33)
ANION GAP SERPL CALCULATED.3IONS-SCNC: 12 MMOL/L (ref 9–17)
AST SERPL-CCNC: 17 U/L
BILIRUB SERPL-MCNC: 0.6 MG/DL (ref 0.3–1.2)
BUN SERPL-MCNC: 18 MG/DL (ref 8–23)
BUN/CREAT SERPL: 23 (ref 9–20)
CALCIUM SERPL-MCNC: 9.1 MG/DL (ref 8.6–10.4)
CHLORIDE SERPL-SCNC: 106 MMOL/L (ref 98–107)
CHOLEST SERPL-MCNC: 172 MG/DL (ref 0–199)
CHOLESTEROL/HDL RATIO: 3
CO2 SERPL-SCNC: 20 MMOL/L (ref 20–31)
CREAT SERPL-MCNC: 0.8 MG/DL (ref 0.5–0.9)
ERYTHROCYTE [DISTWIDTH] IN BLOOD BY AUTOMATED COUNT: 13.6 % (ref 11.8–14.4)
GFR, ESTIMATED: 73 ML/MIN/1.73M2
GLUCOSE SERPL-MCNC: 84 MG/DL (ref 70–99)
HCT VFR BLD AUTO: 45.7 % (ref 36.3–47.1)
HDLC SERPL-MCNC: 66 MG/DL
HGB BLD-MCNC: 14.8 G/DL (ref 11.9–15.1)
LDLC SERPL CALC-MCNC: 86 MG/DL (ref 0–100)
MCH RBC QN AUTO: 30 PG (ref 25.2–33.5)
MCHC RBC AUTO-ENTMCNC: 32.4 G/DL (ref 28.4–34.8)
MCV RBC AUTO: 92.5 FL (ref 82.6–102.9)
NRBC BLD-RTO: 0 PER 100 WBC
PLATELET # BLD AUTO: 252 K/UL (ref 138–453)
PMV BLD AUTO: 9.8 FL (ref 8.1–13.5)
POTASSIUM SERPL-SCNC: 4.2 MMOL/L (ref 3.7–5.3)
PROT SERPL-MCNC: 7.4 G/DL (ref 6.4–8.3)
RBC # BLD AUTO: 4.94 M/UL (ref 3.95–5.11)
SODIUM SERPL-SCNC: 138 MMOL/L (ref 135–144)
TRIGL SERPL-MCNC: 99 MG/DL
VLDLC SERPL CALC-MCNC: 20 MG/DL
WBC OTHER # BLD: 9.2 K/UL (ref 3.5–11.3)

## 2024-05-24 PROCEDURE — 80053 COMPREHEN METABOLIC PANEL: CPT

## 2024-05-24 PROCEDURE — 80061 LIPID PANEL: CPT

## 2024-05-24 PROCEDURE — 36415 COLL VENOUS BLD VENIPUNCTURE: CPT

## 2024-05-24 PROCEDURE — 83036 HEMOGLOBIN GLYCOSYLATED A1C: CPT

## 2024-05-24 PROCEDURE — 85027 COMPLETE CBC AUTOMATED: CPT

## 2024-05-25 LAB
EST. AVERAGE GLUCOSE BLD GHB EST-MCNC: 108 MG/DL
HBA1C MFR BLD: 5.4 % (ref 4–6)

## 2024-05-31 PROBLEM — S60.551A INFECTED FOREIGN BODY IN RIGHT HAND: Status: RESOLVED | Noted: 2018-10-06 | Resolved: 2024-05-31

## 2024-05-31 PROBLEM — M46.26 OSTEOMYELITIS OF LUMBAR SPINE (HCC): Status: RESOLVED | Noted: 2017-11-29 | Resolved: 2024-05-31

## 2024-05-31 PROBLEM — L08.9 INFECTED FOREIGN BODY IN RIGHT HAND: Status: RESOLVED | Noted: 2018-10-06 | Resolved: 2024-05-31

## 2024-05-31 PROBLEM — G06.2 EPIDURAL ABSCESS: Status: RESOLVED | Noted: 2017-11-29 | Resolved: 2024-05-31

## 2024-06-28 ENCOUNTER — APPOINTMENT (OUTPATIENT)
Dept: PHARMACY | Age: 84
End: 2024-06-28
Payer: COMMERCIAL

## 2024-07-02 ENCOUNTER — HOSPITAL ENCOUNTER (OUTPATIENT)
Dept: PHARMACY | Age: 84
Setting detail: THERAPIES SERIES
Discharge: HOME | End: 2024-07-02
Payer: COMMERCIAL

## 2024-07-02 VITALS
SYSTOLIC BLOOD PRESSURE: 153 MMHG | HEART RATE: 66 BPM | BODY MASS INDEX: 36.83 KG/M2 | DIASTOLIC BLOOD PRESSURE: 75 MMHG | WEIGHT: 193 LBS

## 2024-07-02 DIAGNOSIS — Z79.01 LONG TERM CURRENT USE OF ANTICOAGULANT THERAPY: Primary | Chronic | ICD-10-CM

## 2024-07-02 LAB — INR BLD: 2.3

## 2024-07-02 PROCEDURE — 85610 PROTHROMBIN TIME: CPT

## 2024-07-02 PROCEDURE — 99211 OFF/OP EST MAY X REQ PHY/QHP: CPT

## 2024-07-02 NOTE — PROGRESS NOTES
[facility]  ANTICOAGULATION    Referring Provider: Dr Streeter    GOAL INR: 2-3    TODAY'S INR: 2.3    WARFARIN Dosage: Continue 3.75 mg po every Monday and Friday; 7.5 mg po all other days    INR (no units)   Date Value   2024 2.3   2024 2.4   2024 2.5   2024 3   2024 1.8   2023 3.0   2023 1.8       Hemoglobin   Date Value Ref Range Status   2024 14.8 11.9 - 15.1 g/dL Final     Hematocrit   Date Value Ref Range Status   2024 45.7 36.3 - 47.1 % Final     ALT   Date Value Ref Range Status   2024 13 5 - 33 U/L Final     AST   Date Value Ref Range Status   2024 17 <32 U/L Final       Medication changes:  No changes    Notes:    Fingerstick INR drawn per clinic protocol. Patient states no visible blood in urine and no black tarry stool. Denies any missed doses of warfarin. No change in other maintenance medications or in diet. Will recheck INR in 6 weeks. Patient acknowledges working in consult agreement with pharmacist as referred by his/her physician.                    For Pharmacy Admin Tracking Only    Intervention Detail: Adherence Monitorin  Total # of Interventions Recommended: 1  Total # of Interventions Accepted: 1  Time Spent (min): 20    Allison Deng RPh

## 2024-07-11 ENCOUNTER — HOSPITAL ENCOUNTER (OUTPATIENT)
Age: 84
Discharge: HOME | End: 2024-07-11
Payer: COMMERCIAL

## 2024-07-11 DIAGNOSIS — R35.0 URINARY FREQUENCY: ICD-10-CM

## 2024-07-11 LAB
BACTERIA URNS QL MICRO: ABNORMAL
BILIRUB UR QL STRIP: NEGATIVE
CLARITY UR: CLEAR
COLOR UR: YELLOW
EPI CELLS #/AREA URNS HPF: ABNORMAL /HPF (ref 0–25)
GLUCOSE UR STRIP-MCNC: NEGATIVE MG/DL
HGB UR QL STRIP.AUTO: NEGATIVE
KETONES UR STRIP-MCNC: NEGATIVE MG/DL
LEUKOCYTE ESTERASE UR QL STRIP: ABNORMAL
NITRITE UR QL STRIP: POSITIVE
PH UR STRIP: 6 [PH] (ref 5–9)
PROT UR STRIP-MCNC: NEGATIVE MG/DL
RBC #/AREA URNS HPF: ABNORMAL /HPF (ref 0–2)
SP GR UR STRIP: 1.02 (ref 1.01–1.02)
UROBILINOGEN UR STRIP-ACNC: NORMAL EU/DL (ref 0–1)
WBC #/AREA URNS HPF: ABNORMAL /HPF (ref 0–5)

## 2024-07-11 PROCEDURE — 81001 URINALYSIS AUTO W/SCOPE: CPT

## 2024-07-24 ENCOUNTER — TELEPHONE (OUTPATIENT)
Dept: GYNECOLOGIC ONCOLOGY | Age: 84
End: 2024-07-24

## 2024-08-14 ENCOUNTER — ANTI-COAG VISIT (OUTPATIENT)
Age: 84
End: 2024-08-14
Payer: COMMERCIAL

## 2024-08-14 ENCOUNTER — OFFICE VISIT (OUTPATIENT)
Age: 84
End: 2024-08-14

## 2024-08-14 VITALS
RESPIRATION RATE: 18 BRPM | DIASTOLIC BLOOD PRESSURE: 79 MMHG | WEIGHT: 193 LBS | SYSTOLIC BLOOD PRESSURE: 122 MMHG | BODY MASS INDEX: 36.83 KG/M2 | OXYGEN SATURATION: 98 %

## 2024-08-14 DIAGNOSIS — M17.12 OSTEOARTHRITIS OF LEFT KNEE, UNSPECIFIED OSTEOARTHRITIS TYPE: ICD-10-CM

## 2024-08-14 DIAGNOSIS — G89.29 CHRONIC PAIN OF LEFT KNEE: ICD-10-CM

## 2024-08-14 DIAGNOSIS — M47.817 LUMBOSACRAL SPONDYLOSIS WITHOUT MYELOPATHY: Primary | ICD-10-CM

## 2024-08-14 DIAGNOSIS — M96.1 LUMBAR POST-LAMINECTOMY SYNDROME: ICD-10-CM

## 2024-08-14 DIAGNOSIS — E66.9 CLASS 1 OBESITY WITH BODY MASS INDEX (BMI) OF 33.0 TO 33.9 IN ADULT, UNSPECIFIED OBESITY TYPE, UNSPECIFIED WHETHER SERIOUS COMORBIDITY PRESENT: ICD-10-CM

## 2024-08-14 DIAGNOSIS — Z79.01 LONG TERM CURRENT USE OF ANTICOAGULANT THERAPY: Primary | Chronic | ICD-10-CM

## 2024-08-14 DIAGNOSIS — E66.01 SEVERE OBESITY (BMI 35.0-39.9) WITH COMORBIDITY (HCC): ICD-10-CM

## 2024-08-14 DIAGNOSIS — M25.562 CHRONIC PAIN OF LEFT KNEE: ICD-10-CM

## 2024-08-14 LAB
INTERNATIONAL NORMALIZATION RATIO, POC: 3.2
PROTHROMBIN TIME, POC: 0

## 2024-08-14 PROCEDURE — 99212 OFFICE O/P EST SF 10 MIN: CPT

## 2024-08-14 PROCEDURE — 85610 PROTHROMBIN TIME: CPT

## 2024-08-14 RX ORDER — BUPIVACAINE HYDROCHLORIDE 2.5 MG/ML
4 INJECTION, SOLUTION INFILTRATION; PERINEURAL ONCE
Status: COMPLETED | OUTPATIENT
Start: 2024-08-14 | End: 2024-08-14

## 2024-08-14 RX ORDER — TRIAMCINOLONE ACETONIDE 40 MG/ML
40 INJECTION, SUSPENSION INTRA-ARTICULAR; INTRAMUSCULAR ONCE
Status: COMPLETED | OUTPATIENT
Start: 2024-08-14 | End: 2024-08-14

## 2024-08-14 RX ADMIN — BUPIVACAINE HYDROCHLORIDE 10 MG: 2.5 INJECTION, SOLUTION INFILTRATION; PERINEURAL at 13:24

## 2024-08-14 RX ADMIN — TRIAMCINOLONE ACETONIDE 40 MG: 40 INJECTION, SUSPENSION INTRA-ARTICULAR; INTRAMUSCULAR at 13:24

## 2024-08-14 NOTE — PROGRESS NOTES
Procedure Performed: Left knee Intra-articular joint injection    Indications: Chronic left knee pain    Risks, benefits, and alternatives of the procedure were reviewed.  All questions were answered appropriately and informed consent, both written and verbal, was obtained.  The patient was placed in the sitting position with knees flexed in a position of comfort.  A timeout was obtained.  Landmarks were palpated for an anterior approach along the lateral joint line.  The area was prepped with alcohol x3 in typical aseptic fashion.  At this point, a 25-gauge needle was passed into the knee atraumatically.  Then, a total of 4 mL of Marcaine 0.25% combined with 40 mg of Kenalog was slowly injected the knee after negative aspiration.  The needle was withdrawn with the point of needle intact.  The procedure was tolerated without sequelae.  Postop vital signs were taken and noted to be stable.  The patient was kept in the office for approximately 10 minutes and left without signs of any immediate sequelae.   
disability scale. The patient reports worsening quality of life.    PLAN:  Medications:    For nonopioid therapy, the following medications were prescribed:    -Continue medication prescribed by primary care physician    Opioid therapy:  -Not indicated    Interventions:  -Performed left knee intra-articular corticosteroid injection in office at today's visit 8/14/2024 (refer to procedure note)   -Consider left knee peripheral nerve stimulator in future  -Consider lumbar MBB in future    Imaging:  -Ordered lumbar MRI    Behavioral Therapies:  -Continue daily stretching and home exercise program    Referrals:  -None    Follow-up Plan:  -After imaging    Patient was offered intervention where appropriate.     Multi-modal Pain Therapy:  The patient was explicitly considered for multimodal and interdisciplinary therapy. Non-opioid and non-pharmacological opportunities to enhance analgesia and quality of life have been and will continue to be pursued.    Levy Jacob,   Interventional Pain Management/PM&R   [facility]    Orders Placed This Encounter    MRI LUMBAR SPINE WO CONTRAST     Standing Status:   Future     Standing Expiration Date:   8/14/2025     Order Specific Question:   Reason for exam:     Answer:   Chronic low back pain, previous back surgery    triamcinolone acetonide (KENALOG-40) injection 40 mg    BUPivacaine (MARCAINE) 0.25 % injection 10 mg

## 2024-08-14 NOTE — PROGRESS NOTES
[facility]  Medication Management  ANTICOAGULATION    Referring Provider: Dr Streeter     GOAL INR: 2 - 3     TODAY'S INR: 3.2     WARFARIN Dosage: Hold warfarin today then continue warfarin half tablet for 3.75 mg Monday, Friday and 7.5 mg all other days    INR (no units)   Date Value   2024 2.3   2024 2.4   2024 2.5   2024 3   2024 1.8   2023 3.0   2023 1.8       Hemoglobin   Date Value Ref Range Status   2024 14.8 11.9 - 15.1 g/dL Final     Hematocrit   Date Value Ref Range Status   2024 45.7 36.3 - 47.1 % Final     ALT   Date Value Ref Range Status   2024 13 5 - 33 U/L Final     AST   Date Value Ref Range Status   2024 17 <32 U/L Final       Medication changes:  Cymbalta changed from 20 mg to 30 mg daily    Notes:    Fingerstick INR drawn per clinic protocol. Patient states no visible blood in urine and no black tarry stool. Denies any missed doses of warfarin. No change in other maintenance medications or in diet. Will recheck INR in 4 weeks per patient request due to living at \[facility]". Patient acknowledges working in consult agreement with pharmacist as referred by his/her physician.  Patient refused weight and BP because she just came from pain management, who did her vitals.         Patient had a chocolate martini which could increase her INR.       For Pharmacy Admin Tracking Only    Intervention Detail: Adherence Monitorin, Dose Adjustment: 1, reason: Therapy Optimization, and New Rx: 1, reason: Needs Additional Therapy  Total # of Interventions Recommended: 3  Total # of Interventions Accepted: 3  Time Spent (min): 20      Danyell Harris [facility], PharmD

## 2024-09-04 ENCOUNTER — TELEPHONE (OUTPATIENT)
Age: 84
End: 2024-09-04

## 2024-09-04 NOTE — TELEPHONE ENCOUNTER
Was given msg to call patient. Called and spoke to pt. Pt states the knee injections administered by Dr. Jacob did not give her any relief. Pt states at this time she is wanting to get a knee replacement. Pt states that she called her PCP and they are seeing her on 9/6/24 regarding this, pt states she is requesting their office to send referral to [facility] due to her history of MRSA. Advised pt to let our office know if she needed anything further and gave her our best wishes. Pt verbalized understanding and was thankful for the call.

## 2024-09-11 ENCOUNTER — HOSPITAL ENCOUNTER (OUTPATIENT)
Dept: MRI IMAGING | Facility: CLINIC | Age: 84
Discharge: HOME | End: 2024-09-13
Payer: COMMERCIAL

## 2024-09-11 DIAGNOSIS — M47.817 LUMBOSACRAL SPONDYLOSIS WITHOUT MYELOPATHY: ICD-10-CM

## 2024-09-11 PROCEDURE — 72148 MRI LUMBAR SPINE W/O DYE: CPT

## 2024-09-16 ENCOUNTER — ANTI-COAG VISIT (OUTPATIENT)
Age: 84
End: 2024-09-16
Payer: COMMERCIAL

## 2024-09-16 ENCOUNTER — HOSPITAL ENCOUNTER (OUTPATIENT)
Age: 84
Discharge: HOME | End: 2024-09-16
Payer: COMMERCIAL

## 2024-09-16 VITALS
WEIGHT: 198 LBS | BODY MASS INDEX: 37.78 KG/M2 | SYSTOLIC BLOOD PRESSURE: 168 MMHG | DIASTOLIC BLOOD PRESSURE: 76 MMHG | HEART RATE: 72 BPM

## 2024-09-16 DIAGNOSIS — Z79.01 LONG TERM CURRENT USE OF ANTICOAGULANT THERAPY: Primary | Chronic | ICD-10-CM

## 2024-09-16 DIAGNOSIS — R97.1 ELEVATED CANCER ANTIGEN 125 (CA 125): ICD-10-CM

## 2024-09-16 DIAGNOSIS — R19.00 PELVIC MASS IN FEMALE: ICD-10-CM

## 2024-09-16 DIAGNOSIS — R97.1 ELEVATED CANCER ANTIGEN 125 (CA 125): Primary | ICD-10-CM

## 2024-09-16 LAB
CANCER AG125 SERPL-ACNC: 26 U/ML (ref 0–38)
INTERNATIONAL NORMALIZATION RATIO, POC: 2.7
PROTHROMBIN TIME, POC: 0

## 2024-09-16 PROCEDURE — 86304 IMMUNOASSAY TUMOR CA 125: CPT

## 2024-09-16 PROCEDURE — 99211 OFF/OP EST MAY X REQ PHY/QHP: CPT

## 2024-09-16 PROCEDURE — 85610 PROTHROMBIN TIME: CPT

## 2024-09-16 PROCEDURE — 36415 COLL VENOUS BLD VENIPUNCTURE: CPT

## 2024-09-24 ENCOUNTER — TELEPHONE (OUTPATIENT)
Dept: GYNECOLOGIC ONCOLOGY | Age: 84
End: 2024-09-24

## 2024-09-24 DIAGNOSIS — N94.89 ADNEXAL MASS: Primary | ICD-10-CM

## 2024-10-07 ENCOUNTER — TELEPHONE (OUTPATIENT)
Dept: GYNECOLOGIC ONCOLOGY | Age: 84
End: 2024-10-07

## 2024-10-07 NOTE — TELEPHONE ENCOUNTER
Called and inquired on US's being scheduled.  Patient to call Josué Samano today to schedule.  Writer encouraged patient to call office if additional help is needed.  Patient voiced understanding and appreciation.

## 2024-10-08 ENCOUNTER — HOSPITAL ENCOUNTER (OUTPATIENT)
Dept: ULTRASOUND IMAGING | Age: 84
Discharge: HOME | End: 2024-10-10
Payer: COMMERCIAL

## 2024-10-08 DIAGNOSIS — N94.89 ADNEXAL MASS: ICD-10-CM

## 2024-10-08 PROCEDURE — 76856 US EXAM PELVIC COMPLETE: CPT

## 2024-10-08 PROCEDURE — 93976 VASCULAR STUDY: CPT

## 2024-10-09 ENCOUNTER — TELEPHONE (OUTPATIENT)
Age: 84
End: 2024-10-09

## 2024-10-09 NOTE — TELEPHONE ENCOUNTER
Patient left message asking for pain Rx to be sent to pharmacy from Dr Jacob for back pain. I LVM to let her know that she has not seen Dr Jacob since August and he saw her for knee pain then. She will need to schedule an appointment with him prior to being prescribed any pain relief.

## 2024-10-10 NOTE — TELEPHONE ENCOUNTER
Patient calling office (2nd attempt) to reach provider to request medication or appointment for pain. Patient reports she completed MRI on 9/12/24 and can't wait until December to be seen patient reports she can drive to another location to be seen sooner. Please advise on sooner appointment and initial message from patient.

## 2024-10-16 ENCOUNTER — ANTI-COAG VISIT (OUTPATIENT)
Age: 84
End: 2024-10-16
Payer: COMMERCIAL

## 2024-10-16 ENCOUNTER — HOSPITAL ENCOUNTER (OUTPATIENT)
Age: 84
Discharge: HOME | End: 2024-10-16
Payer: COMMERCIAL

## 2024-10-16 VITALS
DIASTOLIC BLOOD PRESSURE: 77 MMHG | HEART RATE: 87 BPM | SYSTOLIC BLOOD PRESSURE: 152 MMHG | BODY MASS INDEX: 38.16 KG/M2 | WEIGHT: 200 LBS

## 2024-10-16 DIAGNOSIS — E78.2 MIXED HYPERLIPIDEMIA: ICD-10-CM

## 2024-10-16 DIAGNOSIS — R73.01 IMPAIRED FASTING GLUCOSE: ICD-10-CM

## 2024-10-16 DIAGNOSIS — Z79.01 LONG TERM CURRENT USE OF ANTICOAGULANT THERAPY: Primary | Chronic | ICD-10-CM

## 2024-10-16 LAB
ALT SERPL-CCNC: 14 U/L (ref 10–35)
ANION GAP SERPL CALCULATED.3IONS-SCNC: 11 MMOL/L (ref 9–16)
AST SERPL-CCNC: 19 U/L (ref 10–35)
BUN SERPL-MCNC: 15 MG/DL (ref 8–23)
BUN/CREAT SERPL: 17 (ref 9–20)
CALCIUM SERPL-MCNC: 9.6 MG/DL (ref 8.6–10.4)
CHLORIDE SERPL-SCNC: 104 MMOL/L (ref 98–107)
CHOLEST SERPL-MCNC: 198 MG/DL (ref 0–199)
CHOLESTEROL/HDL RATIO: 4
CO2 SERPL-SCNC: 25 MMOL/L (ref 20–31)
CREAT SERPL-MCNC: 0.9 MG/DL (ref 0.5–0.9)
ERYTHROCYTE [DISTWIDTH] IN BLOOD BY AUTOMATED COUNT: 13.4 % (ref 11.8–14.4)
EST. AVERAGE GLUCOSE BLD GHB EST-MCNC: 143 MG/DL
GFR, ESTIMATED: 67 ML/MIN/1.73M2
GLUCOSE SERPL-MCNC: 141 MG/DL (ref 74–99)
HBA1C MFR BLD: 6.6 % (ref 4–6)
HCT VFR BLD AUTO: 46.5 % (ref 36.3–47.1)
HDLC SERPL-MCNC: 55 MG/DL
HGB BLD-MCNC: 14.6 G/DL (ref 11.9–15.1)
INTERNATIONAL NORMALIZATION RATIO, POC: 2.2
LDLC SERPL CALC-MCNC: 93 MG/DL (ref 0–100)
MCH RBC QN AUTO: 30.1 PG (ref 25.2–33.5)
MCHC RBC AUTO-ENTMCNC: 31.4 G/DL (ref 28.4–34.8)
MCV RBC AUTO: 95.9 FL (ref 82.6–102.9)
NRBC BLD-RTO: 0 PER 100 WBC
PLATELET # BLD AUTO: 238 K/UL (ref 138–453)
PMV BLD AUTO: 9.4 FL (ref 8.1–13.5)
POTASSIUM SERPL-SCNC: 5 MMOL/L (ref 3.7–5.3)
PROTHROMBIN TIME, POC: 0
RBC # BLD AUTO: 4.85 M/UL (ref 3.95–5.11)
SODIUM SERPL-SCNC: 140 MMOL/L (ref 136–145)
TRIGL SERPL-MCNC: 249 MG/DL
VLDLC SERPL CALC-MCNC: 50 MG/DL
WBC OTHER # BLD: 7.2 K/UL (ref 3.5–11.3)

## 2024-10-16 PROCEDURE — 85610 PROTHROMBIN TIME: CPT

## 2024-10-16 PROCEDURE — 80061 LIPID PANEL: CPT

## 2024-10-16 PROCEDURE — 36415 COLL VENOUS BLD VENIPUNCTURE: CPT

## 2024-10-16 PROCEDURE — 99211 OFF/OP EST MAY X REQ PHY/QHP: CPT

## 2024-10-16 PROCEDURE — 84450 TRANSFERASE (AST) (SGOT): CPT

## 2024-10-16 PROCEDURE — 85027 COMPLETE CBC AUTOMATED: CPT

## 2024-10-16 PROCEDURE — 83036 HEMOGLOBIN GLYCOSYLATED A1C: CPT

## 2024-10-16 PROCEDURE — 84460 ALANINE AMINO (ALT) (SGPT): CPT

## 2024-10-16 PROCEDURE — 80048 BASIC METABOLIC PNL TOTAL CA: CPT

## 2024-10-16 NOTE — PROGRESS NOTES
[facility]  Medication Management  ANTICOAGULATION    Referring Provider: Dr Streeter     GOAL INR: 2 - 3     TODAY'S INR: 2.2     WARFARIN Dosage: Continue warfarin half tablet for 3.75 mg Monday, Friday and 7.5 mg all other days.    INR (no units)   Date Value   2024 2.3   2024 2.4   2024 2.5   2024 3   2024 1.8   2023 3.0   2023 1.8     INR,(POC) (no units)   Date Value   2024 2.7   2024 3.2       Hemoglobin   Date Value Ref Range Status   2024 14.8 11.9 - 15.1 g/dL Final     Hematocrit   Date Value Ref Range Status   2024 45.7 36.3 - 47.1 % Final     ALT   Date Value Ref Range Status   2024 13 5 - 33 U/L Final     AST   Date Value Ref Range Status   2024 17 <32 U/L Final       Medication changes:  Lidocaine patch for back pain (she has trouble getting this on by herself)    Notes:    Fingerstick INR drawn per clinic protocol. Patient states no visible blood in urine and no black tarry stool. Denies any missed doses of warfarin. No change in other maintenance medications or in diet. Will recheck INR in 4 weeks. Patient acknowledges working in consult agreement with pharmacist as referred by his/her physician.  Patient has an appt to see pain management Dr Baker tomorrow.    Patient is going to get her left knee replaced 24 after her birthday cruise.  She is very worried about this as she got MRSA after her last knee replacement and her   3 weeks after his knee replacement.       Patient is going on a cruise over her birthday in December with friends.  Patient is planning a family cruise to [place] next summer in July.                  For Pharmacy Admin Tracking Only    Intervention Detail: Adherence Monitorin and New Rx: 1, reason: Needs Additional Therapy  Total # of Interventions Recommended: 2  Total # of Interventions Accepted: 2  Time Spent (min): 20      Danyell DAVID

## 2024-10-17 ENCOUNTER — TELEPHONE (OUTPATIENT)
Age: 84
End: 2024-10-17

## 2024-10-17 ENCOUNTER — OFFICE VISIT (OUTPATIENT)
Age: 84
End: 2024-10-17
Payer: COMMERCIAL

## 2024-10-17 VITALS
SYSTOLIC BLOOD PRESSURE: 126 MMHG | OXYGEN SATURATION: 95 % | BODY MASS INDEX: 38.16 KG/M2 | DIASTOLIC BLOOD PRESSURE: 84 MMHG | HEART RATE: 101 BPM | RESPIRATION RATE: 18 BRPM | WEIGHT: 200 LBS

## 2024-10-17 DIAGNOSIS — M25.562 CHRONIC PAIN OF LEFT KNEE: ICD-10-CM

## 2024-10-17 DIAGNOSIS — E66.01 SEVERE OBESITY (BMI 35.0-39.9) WITH COMORBIDITY: ICD-10-CM

## 2024-10-17 DIAGNOSIS — M47.817 LUMBOSACRAL SPONDYLOSIS WITHOUT MYELOPATHY: Primary | ICD-10-CM

## 2024-10-17 DIAGNOSIS — M96.1 LUMBAR POST-LAMINECTOMY SYNDROME: ICD-10-CM

## 2024-10-17 DIAGNOSIS — Z79.01 LONG TERM (CURRENT) USE OF ANTICOAGULANTS: ICD-10-CM

## 2024-10-17 DIAGNOSIS — M17.12 OSTEOARTHRITIS OF LEFT KNEE, UNSPECIFIED OSTEOARTHRITIS TYPE: ICD-10-CM

## 2024-10-17 DIAGNOSIS — G89.29 CHRONIC PAIN OF LEFT KNEE: ICD-10-CM

## 2024-10-17 PROCEDURE — 1123F ACP DISCUSS/DSCN MKR DOCD: CPT | Performed by: STUDENT IN AN ORGANIZED HEALTH CARE EDUCATION/TRAINING PROGRAM

## 2024-10-17 PROCEDURE — 99214 OFFICE O/P EST MOD 30 MIN: CPT | Performed by: STUDENT IN AN ORGANIZED HEALTH CARE EDUCATION/TRAINING PROGRAM

## 2024-10-17 NOTE — TELEPHONE ENCOUNTER
Rose L Kahler     1940        female     [address] [address]                     Legal Guardian no  If yes, Name:       Skilled Facility no     If yes, Name:                                             Home Phone: [phone]         Cell Phone:        Telephone Information:   Mobile [phone]                                            Surgeon: Cecil   Surgery Date: 11/14/24                      Time: tbd     Procedure: Bilateral lumbar L3, L4, L5 medial branch block  Duration:     Diagnosis:  Lumbosacral spondylosis without myelopathy (M47.817)  CPT Codes: 15119     Important Medical History:  In Epic     First Assistant no  Special Inst/Equip/Implants: Regular     Nickel allergy  no  Latex Allergy: no      Cardiac Device:  No  If yes, need most recent pacemaker interrogation from Cardiologist:  Type of pacemaker:     Anesthesia:    Local                       Admission Type:  Same Day                        Admit Prior to Day of Surgery: No     Case Location:  Ambulatory            Preadmission Testing:  Phone Call             PAT Date and Time: tbd     Need Preop Cardiac Clearance: no  Need Pre-op/Medical Clearance: no     Does Patient have Cardiologist/physician? Name of Physician:         Special Needs Communication:  Tawanna Lift no    needed no

## 2024-10-17 NOTE — H&P (VIEW-ONLY)
stimulator in future  -No need to hold Coumadin    Imaging:  -Reviewed lumbar MRI with patient in room    Behavioral Therapies:  -Continue daily stretching and home exercise program    Referrals:  -None    Follow-up Plan:  -After procedure    Patient was offered intervention where appropriate.     Multi-modal Pain Therapy:  The patient was explicitly considered for multimodal and interdisciplinary therapy. Non-opioid and non-pharmacological opportunities to enhance analgesia and quality of life have been and will continue to be pursued.    Levy Jacob,   Interventional Pain Management/PM&R   [facility]    Orders Placed This Encounter    FACET JOINT L/S     Standing Status:   Future     Standing Expiration Date:   10/17/2025     Scheduling Instructions:      Bilateral lumbar L3, L4, L5 MBBx2      No need to hold Warfarin    FACET JOINT L/S 2ND LEVEL     Standing Status:   Future     Standing Expiration Date:   10/17/2025

## 2024-10-17 NOTE — PATIENT INSTRUCTIONS
SURVEY:    You may be receiving a survey from [employer] regarding your visit today.    You may get this in the mail, through your MyChart, or in your email.     Please complete the survey to enable us to provide the highest quality of care to you and your family.    If you cannot score us a very good (5 Stars) on any question, please call the office to discuss how we could of made your experience exceptional.    Thank you!    MD Dr. Clare Shipman, DO  Dr. Peter Alvarado, DO    Dr. Levy Jacob, DO    MD Coleen Parks, APRN-WILBUR Cook, MINA Downs LPN Jena Adams, MA Emily Akers, MA    Phone: [phone]  Fax: [phone]    Office Hours:   M-TH 8-5, F: 8-12

## 2024-10-17 NOTE — PROGRESS NOTES
stimulator in future  -No need to hold Coumadin    Imaging:  -Reviewed lumbar MRI with patient in room    Behavioral Therapies:  -Continue daily stretching and home exercise program    Referrals:  -None    Follow-up Plan:  -After procedure    Patient was offered intervention where appropriate.     Multi-modal Pain Therapy:  The patient was explicitly considered for multimodal and interdisciplinary therapy. Non-opioid and non-pharmacological opportunities to enhance analgesia and quality of life have been and will continue to be pursued.    Levy Jacob,   Interventional Pain Management/PM&R   [facility]    Orders Placed This Encounter    FACET JOINT L/S     Standing Status:   Future     Standing Expiration Date:   10/17/2025     Scheduling Instructions:      Bilateral lumbar L3, L4, L5 MBBx2      No need to hold Warfarin    FACET JOINT L/S 2ND LEVEL     Standing Status:   Future     Standing Expiration Date:   10/17/2025

## 2024-10-17 NOTE — TELEPHONE ENCOUNTER
Rose L Kahler     1940        female    [address] [address]                    Legal Guardian no  If yes, Name:       Skilled Facility no     If yes, Name:                                             Home Phone: [phone]         Cell Phone:    Telephone Information:   Mobile [phone]                                           Surgeon: Cecil   Surgery Date: 10/31/24                      Time: tbd    Procedure: Bilateral lumbar L3, L4, L5 medial branch block  Duration:    Diagnosis:  Lumbosacral spondylosis without myelopathy (M47.817)  CPT Codes: 28208    Important Medical History:  In Epic    First Assistant no  Special Inst/Equip/Implants: Regular    Nickel allergy  no  Latex Allergy: no      Cardiac Device:  No  If yes, need most recent pacemaker interrogation from Cardiologist:  Type of pacemaker:    Anesthesia:    Local                       Admission Type:  Same Day                        Admit Prior to Day of Surgery: No    Case Location:  Ambulatory            Preadmission Testing:  Phone Call             PAT Date and Time: tbd    Need Preop Cardiac Clearance: no  Need Pre-op/Medical Clearance: no    Does Patient have Cardiologist/physician? Name of Physician:        Special Needs Communication:  Tawanna Lift no    needed no

## 2024-10-23 ENCOUNTER — TELEPHONE (OUTPATIENT)
Dept: PAIN MANAGEMENT | Age: 84
End: 2024-10-23

## 2024-10-23 DIAGNOSIS — M47.817 LUMBOSACRAL SPONDYLOSIS WITHOUT MYELOPATHY: Primary | ICD-10-CM

## 2024-10-23 NOTE — TELEPHONE ENCOUNTER
Patient called in stating that she is in a large amount of pain and is requesting something for the pain until her injections next week on 10/31. States she is \"sitting here miserable.\" Please advise.

## 2024-10-24 RX ORDER — PREDNISONE 10 MG/1
10 TABLET ORAL DAILY
Qty: 10 TABLET | Refills: 0 | Status: SHIPPED | OUTPATIENT
Start: 2024-10-24 | End: 2024-11-03

## 2024-10-24 NOTE — PROGRESS NOTES
[facility]   Preadmission Testing    Name: Rose L Kahler  : 1940  Patient Phone: [phone] (home) [phone] (work)    Procedure: BILATERAL LUMBAR L3, L4, L5 MEDIAL BRANCH BLOCK - Bilateral   Date of Procedure: 10/31/2024  Surgeon: Levy Jacob DO    Ht:  160 cm (5' 3\")  Wt: 88.9 kg (196 lb)  Wt method: Stated    Allergies:   Allergies   Allergen Reactions    Percocet [Oxycodone-Acetaminophen] Itching                There were no vitals filed for this visit.    No LMP recorded. Patient is postmenopausal.    Do you take blood thinners?   [x] Yes    [] No         Instructed to stop blood thinners prior to procedure?    [x] Yes    [] No      [] N/A    []Eliquis - 3 days  []Xarelto - 3 days  []Pradaxa - 5 days  [x]Coumadin - 5 days   []Plavix - 7 days  []ASA 325mg - 7 days  []Brillinta - 5 days  []Pletal - 2 days   Have you had a respiratory infection or sore throat in last 4 weeks before surgery?    [] Yes    [x] No     Patient instructed on: [x] NPO Status - if receiving sedation  [x] Meds to Take  [x] Ride Home - sedation/ epidurals  [x]No Jewelry/Contact Lenses/Nail Polish     DOS Patient Needs [] HCG   [] Blood Sugar  [] PT/INR

## 2024-10-31 ENCOUNTER — APPOINTMENT (OUTPATIENT)
Dept: GENERAL RADIOLOGY | Age: 84
End: 2024-10-31
Attending: STUDENT IN AN ORGANIZED HEALTH CARE EDUCATION/TRAINING PROGRAM
Payer: COMMERCIAL

## 2024-10-31 ENCOUNTER — HOSPITAL ENCOUNTER (OUTPATIENT)
Age: 84
Setting detail: OUTPATIENT SURGERY
Discharge: HOME | End: 2024-10-31
Attending: STUDENT IN AN ORGANIZED HEALTH CARE EDUCATION/TRAINING PROGRAM | Admitting: STUDENT IN AN ORGANIZED HEALTH CARE EDUCATION/TRAINING PROGRAM
Payer: COMMERCIAL

## 2024-10-31 VITALS
TEMPERATURE: 97 F | OXYGEN SATURATION: 95 % | WEIGHT: 201.8 LBS | HEART RATE: 98 BPM | RESPIRATION RATE: 20 BRPM | BODY MASS INDEX: 35.75 KG/M2 | SYSTOLIC BLOOD PRESSURE: 174 MMHG | HEIGHT: 63 IN | DIASTOLIC BLOOD PRESSURE: 82 MMHG

## 2024-10-31 PROCEDURE — 2500000003 HC RX 250 WO HCPCS: Performed by: STUDENT IN AN ORGANIZED HEALTH CARE EDUCATION/TRAINING PROGRAM

## 2024-10-31 PROCEDURE — 3600000058 HC PAIN LEVEL 5 BASE: Performed by: STUDENT IN AN ORGANIZED HEALTH CARE EDUCATION/TRAINING PROGRAM

## 2024-10-31 PROCEDURE — 7100000010 HC PHASE II RECOVERY - FIRST 15 MIN: Performed by: STUDENT IN AN ORGANIZED HEALTH CARE EDUCATION/TRAINING PROGRAM

## 2024-10-31 PROCEDURE — 2709999900 HC NON-CHARGEABLE SUPPLY: Performed by: STUDENT IN AN ORGANIZED HEALTH CARE EDUCATION/TRAINING PROGRAM

## 2024-10-31 RX ORDER — LIDOCAINE HYDROCHLORIDE 20 MG/ML
INJECTION, SOLUTION EPIDURAL; INFILTRATION; INTRACAUDAL; PERINEURAL PRN
Status: DISCONTINUED | OUTPATIENT
Start: 2024-10-31 | End: 2024-10-31 | Stop reason: ALTCHOICE

## 2024-10-31 ASSESSMENT — PAIN - FUNCTIONAL ASSESSMENT: PAIN_FUNCTIONAL_ASSESSMENT: 0-10

## 2024-10-31 NOTE — INTERVAL H&P NOTE
Update History & Physical    The patient's History and Physical of October 17, 2024 was reviewed with the patient and I examined the patient. There was no change. The surgical site was confirmed by the patient and me.     Plan: The risks, benefits, expected outcome, and alternative to the recommended procedure have been discussed with the patient. Patient understands and wants to proceed with the procedure.     ASA CLASSIFICATION  2  MP   CLASSIFICATION  2    Electronically signed by Levy Jacob DO on 10/31/2024 at 2:35 PM

## 2024-10-31 NOTE — DISCHARGE INSTRUCTIONS
schedule a follow-up visit in 2-3 weeks to review your response to this injection.  Please bring your pain diary with you to this appointment.        Education Materials Received: yes  Belongings Returned: yes    Resume all current outpatient medication(s).         The discharge instructions have been reviewed with the patient and/or Guardian.  Patient and/or Guardian signed and retained a printed copy.

## 2024-10-31 NOTE — OP NOTE
PROCEDURE PERFORMED: Bilateral Lumbar medial branch block    PREOPERATIVE DIAGNOSIS: Lumbosacral spondylosis    INDICATIONS: Chronic low back pain    The patient's history and physical exam were reviewed.  The risk, benefits, and alternatives of the procedure were discussed and all questions were answered to the patient's satisfaction.  The patient agreed to proceed and written informed consent was obtained.    POSTOPERATIVE DIAGNOSIS: Lumbar spondylosis    PHYSICIAN:  Dr. Levy Jacob DO    ANESTHESIA:  LOCAL    ASSISTANT:  NONE    PATHOLOGY:  NONE    ESTIMATED BLOOD LOSS:  N/A    IMPLANTS:  NONE    PROCEDURE DESCRIPTION: Diagnostic bilateral lumbar medial branch block using fluoroscopy    The patient was placed on the operative bed in prone position.  The area was prepped with  Chlorhexidine.  The area was then draped in a sterile fashion.    Targeted levels: Bilateral lumbar L3, L4, L5 medial branch block    An AP  fluoroscopy image was used to identify and iban Tuttle's point at the L3, L4 levels on the targeted side.  Additionally, the junction of the SAP and sacral ala was also marked on the same side.   A 25-gauge 3-1/2 inch Quincke spinal needle was then advanced toward each of these points under fluoroscopic guidance.  Once bone was contacted, negative aspiration was confirmed and 0.5 mL of lidocaine 2% was injected each level.  The needles were removed and the needle sites were dressed appropriately. The same procedure was performed on the opposite side.    The patient was transferred to the postoperative care unit in stable condition.  Written discharge instructions were given to the patient.  The patient was given a pain diary upon discharge.    COMPLICATIONS:  There were no apparent complications.  The patient tolerated the procedure well.

## 2024-11-01 ENCOUNTER — TELEPHONE (OUTPATIENT)
Dept: PAIN MANAGEMENT | Age: 84
End: 2024-11-01

## 2024-11-01 NOTE — TELEPHONE ENCOUNTER
Called patient regarding MBB. Patient states she feels \"wonderful\" and has 100% relief, w/ a pain score of 0/10. Second MBB scheduled for 11/14/2024.

## 2024-11-06 NOTE — PROGRESS NOTES
[facility]   Preadmission Testing    Name: Rose L Kahler  : 1940  Patient Phone: [phone] (home) [phone] (work)    Procedure: BILATERAL LUMBAR L3, L4 L5 MEDIAL BRANCH BLOCK - Bilateral   Date of Procedure: 2024  Surgeon: Levy Jacob DO    Ht:  160 cm (5' 3\")  Wt: 90.7 kg (200 lb)  Wt method: Stated    Allergies:   Allergies   Allergen Reactions    Percocet [Oxycodone-Acetaminophen] Itching                There were no vitals filed for this visit.    No LMP recorded. Patient is postmenopausal.    Do you take blood thinners?   [x] Yes    [] No         Instructed to stop blood thinners prior to procedure?    [] Yes    [] No      [x] N/A    []Eliquis - 3 days  []Xarelto - 3 days  []Pradaxa - 5 days  []Coumadin - 5 days   []Plavix - 7 days  []ASA 325mg - 7 days  []Brillinta - 5 days  []Pletal - 2 days   Have you had a respiratory infection or sore throat in last 4 weeks before surgery?    [] Yes    [x] No     Patient instructed on: [] NPO Status - if receiving sedation  [x] Meds to Take  [] Ride Home - sedation/ epidurals  [x]No Jewelry/Contact Lenses/Nail Polish     DOS Patient Needs [] HCG   [] Blood Sugar  [] PT/INR

## 2024-11-14 ENCOUNTER — HOSPITAL ENCOUNTER (OUTPATIENT)
Age: 84
Setting detail: OUTPATIENT SURGERY
Discharge: HOME | End: 2024-11-14
Attending: STUDENT IN AN ORGANIZED HEALTH CARE EDUCATION/TRAINING PROGRAM | Admitting: STUDENT IN AN ORGANIZED HEALTH CARE EDUCATION/TRAINING PROGRAM
Payer: COMMERCIAL

## 2024-11-14 ENCOUNTER — APPOINTMENT (OUTPATIENT)
Dept: GENERAL RADIOLOGY | Age: 84
End: 2024-11-14
Attending: STUDENT IN AN ORGANIZED HEALTH CARE EDUCATION/TRAINING PROGRAM
Payer: COMMERCIAL

## 2024-11-14 VITALS
OXYGEN SATURATION: 94 % | TEMPERATURE: 96.9 F | RESPIRATION RATE: 16 BRPM | HEIGHT: 63 IN | DIASTOLIC BLOOD PRESSURE: 80 MMHG | HEART RATE: 78 BPM | BODY MASS INDEX: 35.97 KG/M2 | WEIGHT: 203 LBS | SYSTOLIC BLOOD PRESSURE: 137 MMHG

## 2024-11-14 PROCEDURE — 2709999900 HC NON-CHARGEABLE SUPPLY: Performed by: STUDENT IN AN ORGANIZED HEALTH CARE EDUCATION/TRAINING PROGRAM

## 2024-11-14 PROCEDURE — 3600000058 HC PAIN LEVEL 5 BASE: Performed by: STUDENT IN AN ORGANIZED HEALTH CARE EDUCATION/TRAINING PROGRAM

## 2024-11-14 PROCEDURE — 2500000003 HC RX 250 WO HCPCS: Performed by: STUDENT IN AN ORGANIZED HEALTH CARE EDUCATION/TRAINING PROGRAM

## 2024-11-14 PROCEDURE — 7100000010 HC PHASE II RECOVERY - FIRST 15 MIN: Performed by: STUDENT IN AN ORGANIZED HEALTH CARE EDUCATION/TRAINING PROGRAM

## 2024-11-14 RX ORDER — LIDOCAINE HYDROCHLORIDE 20 MG/ML
INJECTION, SOLUTION EPIDURAL; INFILTRATION; INTRACAUDAL; PERINEURAL PRN
Status: DISCONTINUED | OUTPATIENT
Start: 2024-11-14 | End: 2024-11-14 | Stop reason: ALTCHOICE

## 2024-11-14 ASSESSMENT — PAIN - FUNCTIONAL ASSESSMENT: PAIN_FUNCTIONAL_ASSESSMENT: NONE - DENIES PAIN

## 2024-11-14 NOTE — H&P
Pain Pre-Op H&P Note    SUBJECTIVE:  No chief complaint on file.      History of Present Illness:   Rose L Kahler is a 83 y.o. female who presents with chronic low back pain.    Past Medical History:   Diagnosis Date    Adenomatous colon polyp 2015    tubular adenoma    Anxiety     Depression     Diverticulitis 2009    Epidural abscess 2017    with diskitis / osteomyelitis s/p IV Abx course and L2-3 laminectomy    Factor V Leiden (HCC)     GERD (gastroesophageal reflux disease)     History of blood transfusion     no reaction    History of DVT (deep vein thrombosis)     DVT right leg    Hyperlipidemia     Impaired fasting glucose     Kidney stones     MRSA bacteremia 2017       Past Surgical History:   Procedure Laterality Date    APPENDECTOMY      CATARACT REMOVAL Bilateral     Dr. Scott     COLONOSCOPY      Dr. Reeves - no polyps, pan-diverticulosis    COLONOSCOPY  2015    -polyp (tubular adenoma),diverticulosis,hemorrhoids    LUMBAR LAMINECTOMY  10/04/2017    [facility] - L2-3 and partial L4, due to diskitis / ostemyelitis of the lumbar spine    NERVE BLOCK Bilateral 10/31/2024    BILATERAL LUMBAR L3, L4, L5 MEDIAL BRANCH BLOCK performed by Levy Jacob,  at [facility] OR    OVARIAN CYST REMOVAL      IN TENDON SHEATH INCISION Right 2018    Dr. Clark - trigger finger release right index, middle and ring fingers    TONSILLECTOMY  1950    TOTAL KNEE ARTHROPLASTY Right 2017    Dr. Busch in [place]    TUBAL LIGATION      laporscopic    VENTRAL HERNIA REPAIR  2016    Dr. Lorenz - robot assisted laproscopic ventral hernia repair with mesh       Family History   Problem Relation Age of Onset    Colon Cancer Father 50    Other Father         gastric ulcers    Ovarian Cancer Mother 89    Cancer Mother     Colon Cancer Brother 70    Other Brother 55        viral endocarditis -  at 55 on heart transplant list    Deep Vein Thrombosis Brother         factor V

## 2024-11-18 ENCOUNTER — TELEPHONE (OUTPATIENT)
Dept: PAIN MANAGEMENT | Age: 84
End: 2024-11-18

## 2024-11-18 NOTE — TELEPHONE ENCOUNTER
Called and spoke with patient. Advised patient of her appointment  on 12/11/24 in [place]. Patient states she'd be out of town. Offered appointment on 12/19/24 in [place], where she states she will be out of town again. Patient declined and is requesting appointment before new year due to having knee replacement sx at the beginning of Jan.[place] location has no availability on 12/5/24 or 12/12/24. Patient stated she is willing to go to [place]. Made Dr. Jacob and Ijeoma aware so this can be arranged.

## 2024-11-18 NOTE — TELEPHONE ENCOUNTER
Patient contacted the office requesting to speak with nurse in regards to details for upcoming surgical plans. Informed patient the providers were not in the office today. Explained the providers would be informed to contact patient to discuss further. Please advise. Thank you.

## 2024-11-21 ENCOUNTER — ANTI-COAG VISIT (OUTPATIENT)
Age: 84
End: 2024-11-21
Payer: COMMERCIAL

## 2024-11-21 VITALS
WEIGHT: 199 LBS | SYSTOLIC BLOOD PRESSURE: 150 MMHG | BODY MASS INDEX: 35.25 KG/M2 | DIASTOLIC BLOOD PRESSURE: 81 MMHG | HEART RATE: 86 BPM

## 2024-11-21 DIAGNOSIS — Z79.01 LONG TERM CURRENT USE OF ANTICOAGULANT THERAPY: Primary | Chronic | ICD-10-CM

## 2024-11-21 LAB
INTERNATIONAL NORMALIZATION RATIO, POC: 1.9
PROTHROMBIN TIME, POC: 0

## 2024-11-21 PROCEDURE — 99211 OFF/OP EST MAY X REQ PHY/QHP: CPT

## 2024-11-21 PROCEDURE — 85610 PROTHROMBIN TIME: CPT

## 2024-11-21 NOTE — PROGRESS NOTES
ideal body weight: 67.5 kg (148 lb 14.6 oz)      Creatinine (mg/dL)   Date Value   10/16/2024 0.9   2024 0.8   2024 0.9      Estimated Creatinine Clearance: 51 mL/min (based on SCr of 0.9 mg/dL).    INR (no units)   Date Value   2024 2.3   2024 2.4   2024 2.5   2024 3     INR,(POC) (no units)   Date Value   2024 1.9   10/16/2024 2.2   2024 2.7   2024 3.2       Hemoglobin (g/dL)   Date Value   10/16/2024 14.6   2024 14.8   2024 15.2 (H)     Hematocrit (%)   Date Value   10/16/2024 46.5   2024 45.7   2024 48.7 (H)     Platelets (k/uL)   Date Value   10/16/2024 238   2024 252   2024 251       Lovenox Bridge Protocol    Patient to hold warfarin 5 days prior to surgery.  Last warfarin dose on 24.    Start Lovenox 1mg/kg twice daily (wt 90 kg) 36 hours after warfarin discontinuation on 24 am.  `  Stop Lovenox 24 hours before your procedure. Last Lovenox dose on 24  am.    Restart warfarin the evening of your procedure on  24.    Restart Lovenox 24 hours after your procedure on 25 am.    Return to warfarin clinic for INR drawn on 25.    Thank you!    Danyell Harris RPH, PharmD    For Pharmacy Admin Tracking Only    Intervention Detail: Adherence Monitorin, Dose Adjustment: 1, reason: Therapy De-escalation, and New Rx: 1, reason: Needs Additional Therapy  Total # of Interventions Recommended: 3  Total # of Interventions Accepted: 3  Time Spent (min): 30      Danyell Harris RPH, PharmD

## 2024-12-02 ENCOUNTER — HOSPITAL ENCOUNTER (OUTPATIENT)
Dept: PREADMISSION TESTING | Age: 84
Discharge: HOME | End: 2024-12-06
Attending: ORTHOPAEDIC SURGERY | Admitting: ORTHOPAEDIC SURGERY
Payer: COMMERCIAL

## 2024-12-02 VITALS
TEMPERATURE: 97.5 F | RESPIRATION RATE: 18 BRPM | DIASTOLIC BLOOD PRESSURE: 83 MMHG | SYSTOLIC BLOOD PRESSURE: 154 MMHG | WEIGHT: 198 LBS | BODY MASS INDEX: 35.08 KG/M2 | HEIGHT: 63 IN | OXYGEN SATURATION: 95 % | HEART RATE: 90 BPM

## 2024-12-02 LAB
ALBUMIN SERPL-MCNC: 4.2 G/DL (ref 3.5–5.2)
ALBUMIN/GLOB SERPL: 1.4 {RATIO} (ref 1–2.5)
ALP SERPL-CCNC: 88 U/L (ref 35–104)
ALT SERPL-CCNC: 13 U/L (ref 10–35)
ANION GAP SERPL CALCULATED.3IONS-SCNC: 9 MMOL/L (ref 9–16)
AST SERPL-CCNC: 19 U/L (ref 10–35)
BASOPHILS # BLD: 0.08 K/UL (ref 0–0.2)
BASOPHILS NFR BLD: 1 % (ref 0–2)
BILIRUB SERPL-MCNC: 0.4 MG/DL (ref 0–1.2)
BUN SERPL-MCNC: 15 MG/DL (ref 8–23)
BUN/CREAT SERPL: 19 (ref 9–20)
CALCIUM SERPL-MCNC: 9.2 MG/DL (ref 8.6–10.4)
CHLORIDE SERPL-SCNC: 106 MMOL/L (ref 98–107)
CO2 SERPL-SCNC: 25 MMOL/L (ref 20–31)
CREAT SERPL-MCNC: 0.8 MG/DL (ref 0.5–0.9)
EKG ATRIAL RATE: 77 BPM
EKG P AXIS: 18 DEGREES
EKG P-R INTERVAL: 180 MS
EKG Q-T INTERVAL: 402 MS
EKG QRS DURATION: 74 MS
EKG QTC CALCULATION (BAZETT): 454 MS
EKG R AXIS: 6 DEGREES
EKG T AXIS: 22 DEGREES
EKG VENTRICULAR RATE: 77 BPM
EOSINOPHIL # BLD: 0.26 K/UL (ref 0–0.44)
EOSINOPHILS RELATIVE PERCENT: 5 % (ref 1–4)
ERYTHROCYTE [DISTWIDTH] IN BLOOD BY AUTOMATED COUNT: 13.1 % (ref 11.8–14.4)
GFR, ESTIMATED: 73 ML/MIN/1.73M2
GLUCOSE SERPL-MCNC: 92 MG/DL (ref 74–99)
HCT VFR BLD AUTO: 43.2 % (ref 36.3–47.1)
HGB BLD-MCNC: 14.1 G/DL (ref 11.9–15.1)
IMM GRANULOCYTES # BLD AUTO: <0.03 K/UL (ref 0–0.3)
IMM GRANULOCYTES NFR BLD: 0 %
LYMPHOCYTES NFR BLD: 1.27 K/UL (ref 1.1–3.7)
LYMPHOCYTES RELATIVE PERCENT: 22 % (ref 24–43)
MCH RBC QN AUTO: 31.1 PG (ref 25.2–33.5)
MCHC RBC AUTO-ENTMCNC: 32.6 G/DL (ref 28.4–34.8)
MCV RBC AUTO: 95.2 FL (ref 82.6–102.9)
MONOCYTES NFR BLD: 0.5 K/UL (ref 0.1–1.2)
MONOCYTES NFR BLD: 9 % (ref 3–12)
NEUTROPHILS NFR BLD: 63 % (ref 36–65)
NEUTS SEG NFR BLD: 3.59 K/UL (ref 1.5–8.1)
NRBC BLD-RTO: 0 PER 100 WBC
PLATELET # BLD AUTO: 271 K/UL (ref 138–453)
PMV BLD AUTO: 9.4 FL (ref 8.1–13.5)
POTASSIUM SERPL-SCNC: 4.6 MMOL/L (ref 3.7–5.3)
PROT SERPL-MCNC: 7.1 G/DL (ref 6.6–8.7)
RBC # BLD AUTO: 4.54 M/UL (ref 3.95–5.11)
SODIUM SERPL-SCNC: 140 MMOL/L (ref 136–145)
WBC OTHER # BLD: 5.7 K/UL (ref 3.5–11.3)

## 2024-12-02 PROCEDURE — 80053 COMPREHEN METABOLIC PANEL: CPT

## 2024-12-02 PROCEDURE — 36415 COLL VENOUS BLD VENIPUNCTURE: CPT

## 2024-12-02 PROCEDURE — 93010 ELECTROCARDIOGRAM REPORT: CPT | Performed by: INTERNAL MEDICINE

## 2024-12-02 PROCEDURE — 85025 COMPLETE CBC W/AUTO DIFF WBC: CPT

## 2024-12-02 PROCEDURE — 87081 CULTURE SCREEN ONLY: CPT

## 2024-12-02 PROCEDURE — 87641 MR-STAPH DNA AMP PROBE: CPT

## 2024-12-02 PROCEDURE — 93005 ELECTROCARDIOGRAM TRACING: CPT | Performed by: ORTHOPAEDIC SURGERY

## 2024-12-02 RX ORDER — DIMENHYDRINATE 50 MG
50 TABLET ORAL
OUTPATIENT
Start: 2024-12-02 | End: 2024-12-02

## 2024-12-02 RX ORDER — CEFAZOLIN SODIUM/WATER 2 G/20 ML
2000 SYRINGE (ML) INTRAVENOUS ONCE
OUTPATIENT
Start: 2024-12-02 | End: 2024-12-02

## 2024-12-02 RX ORDER — ACETAMINOPHEN 325 MG/1
650 TABLET ORAL ONCE
OUTPATIENT
Start: 2024-12-02 | End: 2024-12-02

## 2024-12-02 ASSESSMENT — PAIN SCALES - GENERAL: PAINLEVEL_OUTOF10: 2

## 2024-12-02 ASSESSMENT — PAIN DESCRIPTION - LOCATION: LOCATION: KNEE

## 2024-12-02 ASSESSMENT — PAIN DESCRIPTION - ORIENTATION: ORIENTATION: LEFT

## 2024-12-02 ASSESSMENT — PAIN DESCRIPTION - PAIN TYPE: TYPE: CHRONIC PAIN

## 2024-12-02 NOTE — PROGRESS NOTES
[facility]   Preadmission Testing    Name: Rose L Kahler  : 1940  Patient Phone: [phone] (home) [phone] (work)    Procedure Left TKA  Date of Procedure: 24  Surgeon: Levy Artis MD    Ht:  160 cm (5' 3\")  Wt: 89.8 kg (198 lb)  Wt method:     Allergies:   Allergies   Allergen Reactions    Percocet [Oxycodone-Acetaminophen] Itching       Peanut allergy: No         Vitals:    24 0906   BP: (!) 154/83   Pulse: 90   Resp: 18   Temp: 97.5 °F (36.4 °C)   SpO2: 95%       No LMP recorded. Patient is postmenopausal.    Do you take blood thinners?   [x] Yes    [] No         Instructed to stop blood thinners prior to procedure?    [x] Yes    [] No      [] N/A   Do you have sleep apnea?   [] Yes    [x] No     Instructed to bring CPAP machine?   [] Yes    [] No    [x] N/A   Do you have acid reflux ?   [] Yes    [x] No     Do you have  hiatal hernia?   [] Yes    [x] No    Do you ever experience motion sickness?   [] Yes    [x] No     Have you had a respiratory infection or sore throat in last 4 weeks before surgery?    [] Yes    [x] No     Do you have poorly controlled asthma or COPD?   [] Yes    [x] No     Do you have a history of angina in the last month or symptomatic arrhythmia?   [] Yes    [x] No     Do you have significant central nervous system disease?   [] Yes    [x] No     Have you had an EKG, labs, or chest xray in last 12 months?  If yes provide copies to anesthesia   [] Yes    [] No       [] Lab    [] EKG    [] CXR     Have you had a stress test?     [x] Yes    [] No    When/where:[place]    Was it normal?    [x] Yes    [] No   Do you or your family have a history of Malignant Hyperthermia? [] Yes    [x] No     Patient instructed on:   [x] NPO Status   [x] Meds to Take Day of Surgery  [x] Ride Home  [x]No Jewelry/Contact Lenses/Dentures day of surgery   [x] Chlorhexidene             PAT Call/Visit Questions  Person Interviewed: Yajaira  Relationship to Patient: Patient  Surgery

## 2024-12-07 LAB
MICROORGANISM SPEC CULT: NORMAL
SPECIMEN DESCRIPTION: NORMAL

## 2024-12-10 ENCOUNTER — HOSPITAL ENCOUNTER (OUTPATIENT)
Dept: PAIN MANAGEMENT | Facility: CLINIC | Age: 84
Discharge: HOME | End: 2024-12-10
Payer: COMMERCIAL

## 2024-12-10 VITALS
HEART RATE: 92 BPM | RESPIRATION RATE: 12 BRPM | OXYGEN SATURATION: 93 % | DIASTOLIC BLOOD PRESSURE: 87 MMHG | TEMPERATURE: 98.6 F | WEIGHT: 199 LBS | SYSTOLIC BLOOD PRESSURE: 140 MMHG | BODY MASS INDEX: 35.26 KG/M2 | HEIGHT: 63 IN

## 2024-12-10 DIAGNOSIS — R52 PAIN MANAGEMENT: ICD-10-CM

## 2024-12-10 PROCEDURE — 64635 DESTROY LUMB/SAC FACET JNT: CPT

## 2024-12-10 PROCEDURE — 6360000002 HC RX W HCPCS: Performed by: STUDENT IN AN ORGANIZED HEALTH CARE EDUCATION/TRAINING PROGRAM

## 2024-12-10 PROCEDURE — 64636 DESTROY L/S FACET JNT ADDL: CPT | Performed by: STUDENT IN AN ORGANIZED HEALTH CARE EDUCATION/TRAINING PROGRAM

## 2024-12-10 PROCEDURE — 64635 DESTROY LUMB/SAC FACET JNT: CPT | Performed by: STUDENT IN AN ORGANIZED HEALTH CARE EDUCATION/TRAINING PROGRAM

## 2024-12-10 PROCEDURE — 64636 DESTROY L/S FACET JNT ADDL: CPT

## 2024-12-10 RX ORDER — TRIAMCINOLONE ACETONIDE 40 MG/ML
INJECTION, SUSPENSION INTRA-ARTICULAR; INTRAMUSCULAR
Status: COMPLETED | OUTPATIENT
Start: 2024-12-10 | End: 2024-12-10

## 2024-12-10 RX ORDER — LIDOCAINE HYDROCHLORIDE 10 MG/ML
INJECTION, SOLUTION EPIDURAL; INFILTRATION; INTRACAUDAL; PERINEURAL
Status: COMPLETED | OUTPATIENT
Start: 2024-12-10 | End: 2024-12-10

## 2024-12-10 RX ORDER — LIDOCAINE HYDROCHLORIDE 20 MG/ML
INJECTION, SOLUTION EPIDURAL; INFILTRATION; INTRACAUDAL; PERINEURAL
Status: COMPLETED | OUTPATIENT
Start: 2024-12-10 | End: 2024-12-10

## 2024-12-10 RX ADMIN — LIDOCAINE HYDROCHLORIDE 4 ML: 10 INJECTION, SOLUTION EPIDURAL; INFILTRATION; INTRACAUDAL at 15:42

## 2024-12-10 RX ADMIN — TRIAMCINOLONE ACETONIDE 80 MG: 40 INJECTION, SUSPENSION INTRA-ARTICULAR; INTRAMUSCULAR at 15:41

## 2024-12-10 RX ADMIN — LIDOCAINE HYDROCHLORIDE 5 ML: 20 INJECTION, SOLUTION EPIDURAL; INFILTRATION; INTRACAUDAL; PERINEURAL at 15:36

## 2024-12-10 ASSESSMENT — PAIN - FUNCTIONAL ASSESSMENT
PAIN_FUNCTIONAL_ASSESSMENT: 0-10
PAIN_FUNCTIONAL_ASSESSMENT: ACTIVITIES ARE NOT PREVENTED

## 2024-12-10 NOTE — DISCHARGE INSTRUCTIONS

## 2024-12-10 NOTE — H&P
benefits, alternatives, and potential complications have been discussed with the patient/family and informed consent has been obtained for the procedure/sedation.    Diagnosis:   Lumbosacral spondylosis      Plan: Bilateral lumbar L3, L4, L5 RFA      ASA CLASSIFICATION  2  MP   CLASSIFICATION  2    Levy Jacob DO

## 2024-12-10 NOTE — OP NOTE
PROCEDURE PERFORMED: Bilateral Lumbar Medial Branch Radiofrequency Ablation using Fluoroscopy    PREOPERATIVE DIAGNOSIS: Lumbosacral spondylosis    INDICATIONS: Chronic low back pain    The patient's history and physical exam were reviewed.  The risk, benefits, and alternatives of the procedure were discussed and all questions were answered to the patient's satisfaction.  The patient agreed to proceed and written informed consent was obtained.    POSTOPERATIVE DIAGNOSIS: Lumbosacral spondylosis    PHYSICIAN:  Dr. Levy Jacob DO    ANESTHESIA:  LOCAL    ASSISTANT:  NONE    PATHOLOGY:  NONE    ESTIMATED BLOOD LOSS:  N/A    IMPLANTS:  NONE    PROCEDURE DESCRIPTION: Bilateral lumbar medial branch radiofrequency ablation using fluoroscopy    The patient was placed on the operative bed in prone position.  The area was prepped with  Chlorhexidine.  The area was then draped in a sterile fashion.    Targeted levels: Bilateral lumbar L3, L4, L5 medial branch radiofrequency ablation    An AP  fluoroscopy image was used to identify and iban Tuttle's point at the L3, L4 levels on the targeted side.  Additionally, the junction of the SAP and sacral ala was also marked on the same side.  The skin and subcutaneous tissues were then anesthetized with 1% lidocaine. At each lumbar medial branch, a 20-gauge, 100 mm curved with 10 mm active tip probe was advanced under AP fluoroscopic projections until bone was contacted along the medial aspect of the transverse process.  Aspiration of each needle was negative for blood, CSF and paresthesia prior to injection.   Motor stimulation at 2 Hz and 1.2 V was negative.  Then, after negative aspiration, 1 mL lidocaine 2% was injected at each level prior to lesioning which was performed at 80°C for 90 seconds.  Once the lesion was complete, injectate solution containing Kenalog 40 mg and 2 mL lidocaine 1% was injected at each site with a total of 1 mL.  The probes were then removed.  The

## 2024-12-24 ENCOUNTER — ANTI-COAG VISIT (OUTPATIENT)
Age: 84
End: 2024-12-24
Payer: COMMERCIAL

## 2024-12-24 VITALS
WEIGHT: 202 LBS | SYSTOLIC BLOOD PRESSURE: 151 MMHG | HEART RATE: 88 BPM | BODY MASS INDEX: 35.78 KG/M2 | DIASTOLIC BLOOD PRESSURE: 77 MMHG

## 2024-12-24 DIAGNOSIS — Z79.01 LONG TERM CURRENT USE OF ANTICOAGULANT THERAPY: Primary | Chronic | ICD-10-CM

## 2024-12-24 LAB
INTERNATIONAL NORMALIZATION RATIO, POC: 2.4
PROTHROMBIN TIME, POC: 0

## 2024-12-24 PROCEDURE — 85610 PROTHROMBIN TIME: CPT

## 2024-12-24 PROCEDURE — 99212 OFFICE O/P EST SF 10 MIN: CPT

## 2024-12-24 RX ORDER — ENOXAPARIN SODIUM 100 MG/ML
90 INJECTION SUBCUTANEOUS 2 TIMES DAILY
Qty: 20 EACH | Refills: 0 | Status: SHIPPED | OUTPATIENT
Start: 2024-12-24

## 2024-12-24 RX ORDER — HYDROCODONE BITARTRATE AND ACETAMINOPHEN 5; 325 MG/1; MG/1
1 TABLET ORAL EVERY 6 HOURS PRN
COMMUNITY
Start: 2024-12-12

## 2024-12-24 RX ORDER — ENOXAPARIN SODIUM 100 MG/ML
90 INJECTION SUBCUTANEOUS 2 TIMES DAILY
COMMUNITY
End: 2024-12-24 | Stop reason: SDUPTHER

## 2024-12-24 NOTE — PROGRESS NOTES
mL/min (based on SCr of 0.8 mg/dL).    INR (no units)   Date Value   2024 2.3   2024 2.4   2024 2.5   2024 3     INR,(POC) (no units)   Date Value   2024 2.4   2024 1.9   10/16/2024 2.2   2024 2.7       Hemoglobin (g/dL)   Date Value   2024 14.1   10/16/2024 14.6   2024 14.8     Hematocrit (%)   Date Value   2024 43.2   10/16/2024 46.5   2024 45.7     Platelets (k/uL)   Date Value   2024 271   10/16/2024 238   2024 252       Lovenox Bridge Protocol     Patient to hold warfarin 5 days prior to surgery.  Last warfarin dose on 24.     Start Lovenox 1mg/kg twice daily (wt 90 kg) 36 hours after warfarin discontinuation on 24 am.  `  Stop Lovenox 24 hours before your procedure. Last Lovenox dose on 24  am.     Restart warfarin the evening of your procedure on  24.     Restart Lovenox 24 hours after your procedure on 25 am.     Return to warfarin clinic for INR drawn on 25.     Thank you!     Danyell Harris RPH, PharmD                For Pharmacy Admin Tracking Only    Intervention Detail: Adherence Monitorin, Dose Adjustment: 1, reason: Therapy De-escalation, and New Rx: 1, reason: Needs Additional Therapy  Total # of Interventions Recommended: 3  Total # of Interventions Accepted: 3  Time Spent (min): 30      Danyell Harris RPH, PharmD

## 2024-12-30 ENCOUNTER — ANESTHESIA EVENT (OUTPATIENT)
Dept: OPERATING ROOM | Age: 84
End: 2024-12-30
Payer: COMMERCIAL

## 2024-12-31 ENCOUNTER — ANESTHESIA (OUTPATIENT)
Dept: OPERATING ROOM | Age: 84
End: 2024-12-31
Payer: COMMERCIAL

## 2024-12-31 ENCOUNTER — APPOINTMENT (OUTPATIENT)
Dept: GENERAL RADIOLOGY | Age: 84
End: 2024-12-31
Attending: ORTHOPAEDIC SURGERY
Payer: COMMERCIAL

## 2024-12-31 ENCOUNTER — HOSPITAL ENCOUNTER (OUTPATIENT)
Age: 84
Setting detail: OBSERVATION
Discharge: HOME | End: 2025-01-02
Attending: ORTHOPAEDIC SURGERY | Admitting: ORTHOPAEDIC SURGERY
Payer: COMMERCIAL

## 2024-12-31 PROBLEM — M17.12 PRIMARY OSTEOARTHRITIS OF LEFT KNEE: Status: ACTIVE | Noted: 2024-12-31

## 2024-12-31 LAB
CREAT SERPL-MCNC: 0.9 MG/DL (ref 0.5–0.9)
GFR, ESTIMATED: 65 ML/MIN/1.73M2
INR PPP: 1
PROTHROMBIN TIME: 13 SEC (ref 11.7–14.1)

## 2024-12-31 PROCEDURE — 6360000002 HC RX W HCPCS: Performed by: ORTHOPAEDIC SURGERY

## 2024-12-31 PROCEDURE — 2580000003 HC RX 258: Performed by: NURSE ANESTHETIST, CERTIFIED REGISTERED

## 2024-12-31 PROCEDURE — 3700000001 HC ADD 15 MINUTES (ANESTHESIA): Performed by: ORTHOPAEDIC SURGERY

## 2024-12-31 PROCEDURE — 2500000003 HC RX 250 WO HCPCS: Performed by: NURSE ANESTHETIST, CERTIFIED REGISTERED

## 2024-12-31 PROCEDURE — 2700000000 HC OXYGEN THERAPY PER DAY

## 2024-12-31 PROCEDURE — 2500000003 HC RX 250 WO HCPCS: Performed by: ORTHOPAEDIC SURGERY

## 2024-12-31 PROCEDURE — 82565 ASSAY OF CREATININE: CPT

## 2024-12-31 PROCEDURE — C1713 ANCHOR/SCREW BN/BN,TIS/BN: HCPCS | Performed by: ORTHOPAEDIC SURGERY

## 2024-12-31 PROCEDURE — 64447 NJX AA&/STRD FEMORAL NRV IMG: CPT | Performed by: NURSE ANESTHETIST, CERTIFIED REGISTERED

## 2024-12-31 PROCEDURE — 2720000010 HC SURG SUPPLY STERILE: Performed by: ORTHOPAEDIC SURGERY

## 2024-12-31 PROCEDURE — 3600000015 HC SURGERY LEVEL 5 ADDTL 15MIN: Performed by: ORTHOPAEDIC SURGERY

## 2024-12-31 PROCEDURE — 94150 VITAL CAPACITY TEST: CPT

## 2024-12-31 PROCEDURE — C1776 JOINT DEVICE (IMPLANTABLE): HCPCS | Performed by: ORTHOPAEDIC SURGERY

## 2024-12-31 PROCEDURE — 6360000002 HC RX W HCPCS: Performed by: NURSE ANESTHETIST, CERTIFIED REGISTERED

## 2024-12-31 PROCEDURE — G0378 HOSPITAL OBSERVATION PER HR: HCPCS

## 2024-12-31 PROCEDURE — 97166 OT EVAL MOD COMPLEX 45 MIN: CPT

## 2024-12-31 PROCEDURE — 3600000005 HC SURGERY LEVEL 5 BASE: Performed by: ORTHOPAEDIC SURGERY

## 2024-12-31 PROCEDURE — 2580000003 HC RX 258: Performed by: ORTHOPAEDIC SURGERY

## 2024-12-31 PROCEDURE — 6370000000 HC RX 637 (ALT 250 FOR IP): Performed by: ORTHOPAEDIC SURGERY

## 2024-12-31 PROCEDURE — 2709999900 HC NON-CHARGEABLE SUPPLY: Performed by: ORTHOPAEDIC SURGERY

## 2024-12-31 PROCEDURE — 85610 PROTHROMBIN TIME: CPT

## 2024-12-31 PROCEDURE — 94761 N-INVAS EAR/PLS OXIMETRY MLT: CPT

## 2024-12-31 PROCEDURE — 73560 X-RAY EXAM OF KNEE 1 OR 2: CPT

## 2024-12-31 PROCEDURE — 97161 PT EVAL LOW COMPLEX 20 MIN: CPT

## 2024-12-31 PROCEDURE — 6370000000 HC RX 637 (ALT 250 FOR IP): Performed by: NURSE ANESTHETIST, CERTIFIED REGISTERED

## 2024-12-31 PROCEDURE — 7100000000 HC PACU RECOVERY - FIRST 15 MIN: Performed by: ORTHOPAEDIC SURGERY

## 2024-12-31 PROCEDURE — 7100000001 HC PACU RECOVERY - ADDTL 15 MIN: Performed by: ORTHOPAEDIC SURGERY

## 2024-12-31 PROCEDURE — 3700000000 HC ANESTHESIA ATTENDED CARE: Performed by: ORTHOPAEDIC SURGERY

## 2024-12-31 DEVICE — LEGION CRUCIATE RETAINING OXINIUM                                    FEMORAL SIZE 4 LEFT
Type: IMPLANTABLE DEVICE | Site: KNEE | Status: FUNCTIONAL
Brand: LEGION

## 2024-12-31 DEVICE — LEGION CRUCIATE RETAINING HIGH                                    FLEX HIGHLY CROSS LINKED                                    POLYETHYLENE SIZE 3-4 15MM
Type: IMPLANTABLE DEVICE | Site: KNEE | Status: FUNCTIONAL
Brand: LEGION

## 2024-12-31 DEVICE — GENESIS II NON-POROUS TIBIAL                                    BASEPLATE SIZE 4 LEFT
Type: IMPLANTABLE DEVICE | Site: KNEE | Status: FUNCTIONAL
Brand: GENESIS II

## 2024-12-31 DEVICE — CEMENT BNE 40GM HI VISC RADPQ FOR REV SURG: Type: IMPLANTABLE DEVICE | Site: KNEE | Status: FUNCTIONAL

## 2024-12-31 DEVICE — CEMENT BNE 40GM W/ GENT HI VISC RADPQ FOR REV SURG: Type: IMPLANTABLE DEVICE | Site: KNEE | Status: FUNCTIONAL

## 2024-12-31 DEVICE — GENESIS II OVAL RESURFACING                                    PATELLAR 35MM
Type: IMPLANTABLE DEVICE | Site: KNEE | Status: FUNCTIONAL
Brand: GENESIS II

## 2024-12-31 RX ORDER — SODIUM CHLORIDE 9 MG/ML
INJECTION, SOLUTION INTRAMUSCULAR; INTRAVENOUS; SUBCUTANEOUS
Status: DISCONTINUED | OUTPATIENT
Start: 2024-12-31 | End: 2024-12-31 | Stop reason: SDUPTHER

## 2024-12-31 RX ORDER — DEXAMETHASONE SODIUM PHOSPHATE 4 MG/ML
INJECTION, SOLUTION INTRA-ARTICULAR; INTRALESIONAL; INTRAMUSCULAR; INTRAVENOUS; SOFT TISSUE
Status: DISCONTINUED | OUTPATIENT
Start: 2024-12-31 | End: 2024-12-31 | Stop reason: SDUPTHER

## 2024-12-31 RX ORDER — SODIUM CHLORIDE 9 MG/ML
INJECTION, SOLUTION INTRAVENOUS CONTINUOUS
Status: DISCONTINUED | OUTPATIENT
Start: 2024-12-31 | End: 2024-12-31 | Stop reason: HOSPADM

## 2024-12-31 RX ORDER — DEXMEDETOMIDINE HYDROCHLORIDE 100 UG/ML
INJECTION, SOLUTION INTRAVENOUS
Status: DISCONTINUED | OUTPATIENT
Start: 2024-12-31 | End: 2024-12-31 | Stop reason: SDUPTHER

## 2024-12-31 RX ORDER — ROSUVASTATIN CALCIUM 10 MG/1
10 TABLET, COATED ORAL EVERY OTHER DAY
Status: DISCONTINUED | OUTPATIENT
Start: 2024-12-31 | End: 2025-01-02 | Stop reason: HOSPADM

## 2024-12-31 RX ORDER — ACETAMINOPHEN 325 MG/1
650 TABLET ORAL ONCE
Status: DISCONTINUED | OUTPATIENT
Start: 2024-12-31 | End: 2024-12-31 | Stop reason: HOSPADM

## 2024-12-31 RX ORDER — BUPIVACAINE/KETOROLAC/KETAMINE 150-60/50
SYRINGE (ML) INJECTION PRN
Status: DISCONTINUED | OUTPATIENT
Start: 2024-12-31 | End: 2024-12-31 | Stop reason: ALTCHOICE

## 2024-12-31 RX ORDER — SENNA AND DOCUSATE SODIUM 50; 8.6 MG/1; MG/1
1 TABLET, FILM COATED ORAL 2 TIMES DAILY
Status: DISCONTINUED | OUTPATIENT
Start: 2024-12-31 | End: 2025-01-02 | Stop reason: HOSPADM

## 2024-12-31 RX ORDER — CEFAZOLIN SODIUM/WATER 2 G/20 ML
2000 SYRINGE (ML) INTRAVENOUS EVERY 8 HOURS
Status: COMPLETED | OUTPATIENT
Start: 2024-12-31 | End: 2025-01-01

## 2024-12-31 RX ORDER — LABETALOL HYDROCHLORIDE 5 MG/ML
INJECTION, SOLUTION INTRAVENOUS
Status: DISCONTINUED | OUTPATIENT
Start: 2024-12-31 | End: 2024-12-31 | Stop reason: SDUPTHER

## 2024-12-31 RX ORDER — PROPOFOL 10 MG/ML
INJECTION, EMULSION INTRAVENOUS
Status: DISCONTINUED | OUTPATIENT
Start: 2024-12-31 | End: 2024-12-31 | Stop reason: SDUPTHER

## 2024-12-31 RX ORDER — FENTANYL CITRATE 50 UG/ML
INJECTION, SOLUTION INTRAMUSCULAR; INTRAVENOUS
Status: DISCONTINUED | OUTPATIENT
Start: 2024-12-31 | End: 2024-12-31 | Stop reason: SDUPTHER

## 2024-12-31 RX ORDER — ROPIVACAINE HYDROCHLORIDE 5 MG/ML
INJECTION, SOLUTION EPIDURAL; INFILTRATION; PERINEURAL
Status: DISCONTINUED | OUTPATIENT
Start: 2024-12-31 | End: 2024-12-31 | Stop reason: SDUPTHER

## 2024-12-31 RX ORDER — ONDANSETRON 2 MG/ML
INJECTION INTRAMUSCULAR; INTRAVENOUS
Status: DISCONTINUED | OUTPATIENT
Start: 2024-12-31 | End: 2024-12-31 | Stop reason: SDUPTHER

## 2024-12-31 RX ORDER — METOCLOPRAMIDE HYDROCHLORIDE 5 MG/ML
INJECTION INTRAMUSCULAR; INTRAVENOUS
Status: DISCONTINUED | OUTPATIENT
Start: 2024-12-31 | End: 2024-12-31 | Stop reason: SDUPTHER

## 2024-12-31 RX ORDER — SODIUM CHLORIDE, SODIUM LACTATE, POTASSIUM CHLORIDE, CALCIUM CHLORIDE 600; 310; 30; 20 MG/100ML; MG/100ML; MG/100ML; MG/100ML
INJECTION, SOLUTION INTRAVENOUS CONTINUOUS
Status: DISCONTINUED | OUTPATIENT
Start: 2024-12-31 | End: 2024-12-31 | Stop reason: HOSPADM

## 2024-12-31 RX ORDER — ONDANSETRON 2 MG/ML
4 INJECTION INTRAMUSCULAR; INTRAVENOUS
Status: DISCONTINUED | OUTPATIENT
Start: 2024-12-31 | End: 2024-12-31 | Stop reason: HOSPADM

## 2024-12-31 RX ORDER — EZETIMIBE 10 MG/1
10 TABLET ORAL DAILY
Status: DISCONTINUED | OUTPATIENT
Start: 2024-12-31 | End: 2025-01-02 | Stop reason: HOSPADM

## 2024-12-31 RX ORDER — SODIUM CHLORIDE 9 MG/ML
INJECTION, SOLUTION INTRAVENOUS PRN
Status: DISCONTINUED | OUTPATIENT
Start: 2024-12-31 | End: 2025-01-02 | Stop reason: HOSPADM

## 2024-12-31 RX ORDER — DIMENHYDRINATE 50 MG
50 TABLET ORAL ONCE
Status: COMPLETED | OUTPATIENT
Start: 2024-12-31 | End: 2024-12-31

## 2024-12-31 RX ORDER — ACETAMINOPHEN 325 MG/1
650 TABLET ORAL EVERY 6 HOURS PRN
Status: DISCONTINUED | OUTPATIENT
Start: 2024-12-31 | End: 2025-01-02 | Stop reason: HOSPADM

## 2024-12-31 RX ORDER — ACETAMINOPHEN 325 MG/1
650 TABLET ORAL ONCE
Status: COMPLETED | OUTPATIENT
Start: 2024-12-31 | End: 2024-12-31

## 2024-12-31 RX ORDER — FAMOTIDINE 10 MG/ML
INJECTION, SOLUTION INTRAVENOUS
Status: DISCONTINUED | OUTPATIENT
Start: 2024-12-31 | End: 2024-12-31 | Stop reason: SDUPTHER

## 2024-12-31 RX ORDER — DEXAMETHASONE SODIUM PHOSPHATE 10 MG/ML
INJECTION, SOLUTION INTRAMUSCULAR; INTRAVENOUS
Status: DISCONTINUED | OUTPATIENT
Start: 2024-12-31 | End: 2024-12-31 | Stop reason: SDUPTHER

## 2024-12-31 RX ORDER — ONDANSETRON 2 MG/ML
4 INJECTION INTRAMUSCULAR; INTRAVENOUS EVERY 6 HOURS PRN
Status: DISCONTINUED | OUTPATIENT
Start: 2024-12-31 | End: 2025-01-02 | Stop reason: HOSPADM

## 2024-12-31 RX ORDER — VANCOMYCIN 1.75 G/350ML
15 INJECTION, SOLUTION INTRAVENOUS ONCE
Status: COMPLETED | OUTPATIENT
Start: 2024-12-31 | End: 2024-12-31

## 2024-12-31 RX ORDER — HYDROCODONE BITARTRATE AND ACETAMINOPHEN 5; 325 MG/1; MG/1
1 TABLET ORAL EVERY 4 HOURS PRN
Status: DISCONTINUED | OUTPATIENT
Start: 2024-12-31 | End: 2025-01-02 | Stop reason: HOSPADM

## 2024-12-31 RX ORDER — VITAMIN B COMPLEX
1000 TABLET ORAL DAILY
Status: DISCONTINUED | OUTPATIENT
Start: 2024-12-31 | End: 2025-01-02 | Stop reason: HOSPADM

## 2024-12-31 RX ORDER — LIDOCAINE HYDROCHLORIDE 20 MG/ML
INJECTION, SOLUTION EPIDURAL; INFILTRATION; INTRACAUDAL; PERINEURAL
Status: DISCONTINUED | OUTPATIENT
Start: 2024-12-31 | End: 2024-12-31 | Stop reason: SDUPTHER

## 2024-12-31 RX ORDER — ENOXAPARIN SODIUM 100 MG/ML
90 INJECTION SUBCUTANEOUS 2 TIMES DAILY
Status: DISCONTINUED | OUTPATIENT
Start: 2025-01-01 | End: 2025-01-02 | Stop reason: HOSPADM

## 2024-12-31 RX ORDER — NALOXONE HYDROCHLORIDE 0.4 MG/ML
INJECTION, SOLUTION INTRAMUSCULAR; INTRAVENOUS; SUBCUTANEOUS PRN
Status: DISCONTINUED | OUTPATIENT
Start: 2024-12-31 | End: 2024-12-31 | Stop reason: HOSPADM

## 2024-12-31 RX ORDER — DIMENHYDRINATE 50 MG
50 TABLET ORAL
Status: DISCONTINUED | OUTPATIENT
Start: 2024-12-31 | End: 2024-12-31 | Stop reason: HOSPADM

## 2024-12-31 RX ORDER — VANCOMYCIN 1.75 G/350ML
1250 INJECTION, SOLUTION INTRAVENOUS EVERY 24 HOURS
Status: DISCONTINUED | OUTPATIENT
Start: 2025-01-01 | End: 2025-01-02 | Stop reason: HOSPADM

## 2024-12-31 RX ORDER — VANCOMYCIN 1.5 G/300ML
1500 INJECTION, SOLUTION INTRAVENOUS EVERY 12 HOURS
Status: DISCONTINUED | OUTPATIENT
Start: 2024-12-31 | End: 2024-12-31

## 2024-12-31 RX ORDER — UBIDECARENONE 75 MG
100 CAPSULE ORAL DAILY
Status: DISCONTINUED | OUTPATIENT
Start: 2024-12-31 | End: 2024-12-31 | Stop reason: CLARIF

## 2024-12-31 RX ORDER — WARFARIN SODIUM 7.5 MG/1
7.5 TABLET ORAL ONCE
Status: COMPLETED | OUTPATIENT
Start: 2024-12-31 | End: 2024-12-31

## 2024-12-31 RX ORDER — SODIUM CHLORIDE 9 MG/ML
INJECTION, SOLUTION INTRAVENOUS CONTINUOUS
Status: DISCONTINUED | OUTPATIENT
Start: 2024-12-31 | End: 2025-01-02

## 2024-12-31 RX ORDER — MORPHINE SULFATE 2 MG/ML
2 INJECTION, SOLUTION INTRAMUSCULAR; INTRAVENOUS
Status: DISCONTINUED | OUTPATIENT
Start: 2024-12-31 | End: 2025-01-02

## 2024-12-31 RX ORDER — DULOXETIN HYDROCHLORIDE 60 MG/1
60 CAPSULE, DELAYED RELEASE ORAL DAILY
Status: DISCONTINUED | OUTPATIENT
Start: 2024-12-31 | End: 2025-01-02 | Stop reason: HOSPADM

## 2024-12-31 RX ORDER — ONDANSETRON 2 MG/ML
4 INJECTION INTRAMUSCULAR; INTRAVENOUS ONCE
Status: COMPLETED | OUTPATIENT
Start: 2024-12-31 | End: 2024-12-31

## 2024-12-31 RX ORDER — DEXMEDETOMIDINE HYDROCHLORIDE 4 UG/ML
INJECTION, SOLUTION INTRAVENOUS
Status: DISCONTINUED | OUTPATIENT
Start: 2024-12-31 | End: 2024-12-31

## 2024-12-31 RX ORDER — CELECOXIB 200 MG/1
200 CAPSULE ORAL 2 TIMES DAILY
Status: COMPLETED | OUTPATIENT
Start: 2024-12-31 | End: 2024-12-31

## 2024-12-31 RX ORDER — MORPHINE SULFATE 4 MG/ML
4 INJECTION, SOLUTION INTRAMUSCULAR; INTRAVENOUS
Status: DISCONTINUED | OUTPATIENT
Start: 2024-12-31 | End: 2025-01-02

## 2024-12-31 RX ORDER — ONDANSETRON 4 MG/1
4 TABLET, ORALLY DISINTEGRATING ORAL EVERY 8 HOURS PRN
Status: DISCONTINUED | OUTPATIENT
Start: 2024-12-31 | End: 2025-01-02 | Stop reason: HOSPADM

## 2024-12-31 RX ORDER — SODIUM CHLORIDE 0.9 % (FLUSH) 0.9 %
5-40 SYRINGE (ML) INJECTION EVERY 12 HOURS SCHEDULED
Status: DISCONTINUED | OUTPATIENT
Start: 2024-12-31 | End: 2025-01-02 | Stop reason: HOSPADM

## 2024-12-31 RX ORDER — MIDAZOLAM HYDROCHLORIDE 1 MG/ML
INJECTION, SOLUTION INTRAMUSCULAR; INTRAVENOUS
Status: DISCONTINUED | OUTPATIENT
Start: 2024-12-31 | End: 2024-12-31 | Stop reason: SDUPTHER

## 2024-12-31 RX ORDER — HYDROCODONE BITARTRATE AND ACETAMINOPHEN 5; 325 MG/1; MG/1
2 TABLET ORAL EVERY 4 HOURS PRN
Status: DISCONTINUED | OUTPATIENT
Start: 2024-12-31 | End: 2025-01-02 | Stop reason: HOSPADM

## 2024-12-31 RX ORDER — CEFAZOLIN SODIUM/WATER 2 G/20 ML
2000 SYRINGE (ML) INTRAVENOUS ONCE
Status: COMPLETED | OUTPATIENT
Start: 2024-12-31 | End: 2024-12-31

## 2024-12-31 RX ORDER — SODIUM CHLORIDE 0.9 % (FLUSH) 0.9 %
5-40 SYRINGE (ML) INJECTION PRN
Status: DISCONTINUED | OUTPATIENT
Start: 2024-12-31 | End: 2025-01-02 | Stop reason: HOSPADM

## 2024-12-31 RX ORDER — FENTANYL CITRATE 50 UG/ML
25 INJECTION, SOLUTION INTRAMUSCULAR; INTRAVENOUS EVERY 5 MIN PRN
Status: DISCONTINUED | OUTPATIENT
Start: 2024-12-31 | End: 2024-12-31 | Stop reason: HOSPADM

## 2024-12-31 RX ADMIN — DEXAMETHASONE SODIUM PHOSPHATE 10 MG: 10 INJECTION, SOLUTION INTRAMUSCULAR; INTRAVENOUS at 08:00

## 2024-12-31 RX ADMIN — FENTANYL CITRATE 50 MCG: 50 INJECTION INTRAMUSCULAR; INTRAVENOUS at 08:58

## 2024-12-31 RX ADMIN — PROPOFOL 40 MG: 10 INJECTION, EMULSION INTRAVENOUS at 08:43

## 2024-12-31 RX ADMIN — SODIUM CHLORIDE: 9 INJECTION, SOLUTION INTRAVENOUS at 07:10

## 2024-12-31 RX ADMIN — DIMENHYDRINATE 50 MG: 50 TABLET ORAL at 07:08

## 2024-12-31 RX ADMIN — LABETALOL HYDROCHLORIDE 5 MG: 5 INJECTION INTRAVENOUS at 08:56

## 2024-12-31 RX ADMIN — ONDANSETRON 4 MG: 2 INJECTION, SOLUTION INTRAMUSCULAR; INTRAVENOUS at 10:05

## 2024-12-31 RX ADMIN — DOCUSATE SODIUM 50 MG AND SENNOSIDES 8.6 MG 1 TABLET: 8.6; 5 TABLET, FILM COATED ORAL at 12:04

## 2024-12-31 RX ADMIN — LABETALOL HYDROCHLORIDE 5 MG: 5 INJECTION INTRAVENOUS at 09:16

## 2024-12-31 RX ADMIN — FAMOTIDINE 20 MG: 10 INJECTION, SOLUTION INTRAVENOUS at 08:17

## 2024-12-31 RX ADMIN — Medication 2000 MG: at 08:26

## 2024-12-31 RX ADMIN — SODIUM CHLORIDE 10 ML: 9 INJECTION, SOLUTION INTRAMUSCULAR; INTRAVENOUS; SUBCUTANEOUS at 08:00

## 2024-12-31 RX ADMIN — LIDOCAINE HYDROCHLORIDE 80 MG: 20 INJECTION, SOLUTION EPIDURAL; INFILTRATION; INTRACAUDAL; PERINEURAL at 08:22

## 2024-12-31 RX ADMIN — HYDROCODONE BITARTRATE AND ACETAMINOPHEN 2 TABLET: 5; 325 TABLET ORAL at 20:30

## 2024-12-31 RX ADMIN — DEXMEDETOMIDINE HCL 8 MCG: 100 INJECTION INTRAVENOUS at 09:38

## 2024-12-31 RX ADMIN — LABETALOL HYDROCHLORIDE 5 MG: 5 INJECTION INTRAVENOUS at 09:00

## 2024-12-31 RX ADMIN — PROPOFOL 110 MG: 10 INJECTION, EMULSION INTRAVENOUS at 08:22

## 2024-12-31 RX ADMIN — ROSUVASTATIN CALCIUM 10 MG: 10 TABLET, FILM COATED ORAL at 12:05

## 2024-12-31 RX ADMIN — HYDROCODONE BITARTRATE AND ACETAMINOPHEN 2 TABLET: 5; 325 TABLET ORAL at 12:04

## 2024-12-31 RX ADMIN — ACETAMINOPHEN 650 MG: 325 TABLET ORAL at 07:08

## 2024-12-31 RX ADMIN — METOCLOPRAMIDE 10 MG: 5 INJECTION, SOLUTION INTRAMUSCULAR; INTRAVENOUS at 08:21

## 2024-12-31 RX ADMIN — MORPHINE SULFATE 4 MG: 4 INJECTION, SOLUTION INTRAMUSCULAR; INTRAVENOUS at 19:03

## 2024-12-31 RX ADMIN — ONDANSETRON 4 MG: 2 INJECTION, SOLUTION INTRAMUSCULAR; INTRAVENOUS at 07:09

## 2024-12-31 RX ADMIN — CELECOXIB 200 MG: 200 CAPSULE ORAL at 12:04

## 2024-12-31 RX ADMIN — VANCOMYCIN 1250 MG: 1.75 INJECTION, SOLUTION INTRAVENOUS at 07:55

## 2024-12-31 RX ADMIN — DOCUSATE SODIUM 50 MG AND SENNOSIDES 8.6 MG 1 TABLET: 8.6; 5 TABLET, FILM COATED ORAL at 20:31

## 2024-12-31 RX ADMIN — Medication 2000 MG: at 15:47

## 2024-12-31 RX ADMIN — EZETIMIBE 10 MG: 10 TABLET ORAL at 12:04

## 2024-12-31 RX ADMIN — HYDROCODONE BITARTRATE AND ACETAMINOPHEN 1 TABLET: 5; 325 TABLET ORAL at 16:05

## 2024-12-31 RX ADMIN — DEXMEDETOMIDINE HCL 4 MCG: 100 INJECTION INTRAVENOUS at 09:59

## 2024-12-31 RX ADMIN — DULOXETINE HYDROCHLORIDE 60 MG: 60 CAPSULE, DELAYED RELEASE ORAL at 12:04

## 2024-12-31 RX ADMIN — DEXAMETHASONE SODIUM PHOSPHATE 4 MG: 4 INJECTION INTRA-ARTICULAR; INTRALESIONAL; INTRAMUSCULAR; INTRAVENOUS; SOFT TISSUE at 10:02

## 2024-12-31 RX ADMIN — SODIUM CHLORIDE: 9 INJECTION, SOLUTION INTRAVENOUS at 12:08

## 2024-12-31 RX ADMIN — CELECOXIB 200 MG: 200 CAPSULE ORAL at 20:31

## 2024-12-31 RX ADMIN — Medication 1000 UNITS: at 12:04

## 2024-12-31 RX ADMIN — PROPOFOL 70 MCG/KG/MIN: 10 INJECTION, EMULSION INTRAVENOUS at 08:21

## 2024-12-31 RX ADMIN — MIDAZOLAM 2 MG: 1 INJECTION INTRAMUSCULAR; INTRAVENOUS at 07:54

## 2024-12-31 RX ADMIN — ROPIVACAINE HYDROCHLORIDE 33 ML: 5 INJECTION EPIDURAL; INFILTRATION; PERINEURAL at 08:00

## 2024-12-31 RX ADMIN — PROPOFOL 30 MG: 10 INJECTION, EMULSION INTRAVENOUS at 09:00

## 2024-12-31 RX ADMIN — WARFARIN SODIUM 7.5 MG: 7.5 TABLET ORAL at 17:34

## 2024-12-31 RX ADMIN — FENTANYL CITRATE 50 MCG: 50 INJECTION INTRAMUSCULAR; INTRAVENOUS at 08:43

## 2024-12-31 ASSESSMENT — PAIN DESCRIPTION - LOCATION
LOCATION: ANKLE
LOCATION: KNEE
LOCATION: ANKLE
LOCATION: KNEE
LOCATION: BACK
LOCATION: KNEE
LOCATION: KNEE

## 2024-12-31 ASSESSMENT — PAIN SCALES - GENERAL
PAINLEVEL_OUTOF10: 4
PAINLEVEL_OUTOF10: 10
PAINLEVEL_OUTOF10: 7
PAINLEVEL_OUTOF10: 9
PAINLEVEL_OUTOF10: 4
PAINLEVEL_OUTOF10: 4
PAINLEVEL_OUTOF10: 0
PAINLEVEL_OUTOF10: 0

## 2024-12-31 ASSESSMENT — PAIN DESCRIPTION - ORIENTATION
ORIENTATION: LEFT
ORIENTATION: MID
ORIENTATION: LEFT
ORIENTATION: LEFT

## 2024-12-31 ASSESSMENT — PAIN DESCRIPTION - PAIN TYPE
TYPE: SURGICAL PAIN
TYPE: SURGICAL PAIN
TYPE: ACUTE PAIN;SURGICAL PAIN
TYPE: CHRONIC PAIN

## 2024-12-31 ASSESSMENT — PAIN DESCRIPTION - ONSET
ONSET: ON-GOING

## 2024-12-31 ASSESSMENT — PAIN - FUNCTIONAL ASSESSMENT
PAIN_FUNCTIONAL_ASSESSMENT: ACTIVITIES ARE NOT PREVENTED
PAIN_FUNCTIONAL_ASSESSMENT: NONE - DENIES PAIN
PAIN_FUNCTIONAL_ASSESSMENT: PREVENTS OR INTERFERES SOME ACTIVE ACTIVITIES AND ADLS
PAIN_FUNCTIONAL_ASSESSMENT: ACTIVITIES ARE NOT PREVENTED
PAIN_FUNCTIONAL_ASSESSMENT: PREVENTS OR INTERFERES WITH MANY ACTIVE NOT PASSIVE ACTIVITIES

## 2024-12-31 ASSESSMENT — PAIN DESCRIPTION - DESCRIPTORS
DESCRIPTORS: ACHING
DESCRIPTORS: DULL;ACHING
DESCRIPTORS: ACHING
DESCRIPTORS: ACHING
DESCRIPTORS: ACHING;SHOOTING

## 2024-12-31 ASSESSMENT — PAIN DESCRIPTION - FREQUENCY
FREQUENCY: INTERMITTENT
FREQUENCY: CONTINUOUS

## 2024-12-31 ASSESSMENT — LIFESTYLE VARIABLES: SMOKING_STATUS: 0

## 2024-12-31 NOTE — PROGRESS NOTES
Pt arrived to 332 from surgery at 1129, she is alert and oriented x4, was on 3L O2 and now on room air, medicated for left knee pain with Norco, she has an Aquacel on that has bloody drainage about 50% covered now, will continue to monitor as pt was on blood thinners at home. Pt getting up to chair now with therapy.

## 2024-12-31 NOTE — PROGRESS NOTES
Spoke to Dr Artis at this time and reported that pt's dressing is 75% saturated with blood, dressing still intact. Dr Artis states to leave aquacel on, remove OCTAVIO hose from that leg and apply two ABD's and ACE wrap to the left knee. He states he will change the dressing tomorrow morning.

## 2024-12-31 NOTE — ANESTHESIA PROCEDURE NOTES
Peripheral Block    Patient location during procedure: pre-op  Reason for block: post-op pain management and at surgeon's request  Start time: 12/31/2024 7:54 AM  End time: 12/31/2024 8:00 AM  Staffing  Performed: resident/CRNA   Resident/CRNA: Sara Jara APRN - CRNA  Performed by: Sara Jara APRN - CRNA  Authorized by: Levy Artis MD    Preanesthetic Checklist  Completed: patient identified, IV checked, site marked, risks and benefits discussed, surgical/procedural consents, equipment checked, pre-op evaluation, timeout performed, anesthesia consent given, oxygen available and monitors applied/VS acknowledged  Peripheral Block   Patient position: supine  Prep: ChloraPrep  Provider prep: mask and sterile gloves  Patient monitoring: continuous pulse ox, IV access and responsive to questions  Block type: Saphenous and iPacks  Laterality: left  Injection technique: single-shot  Guidance: ultrasound guided  Local infiltration: decadron and ropivacaine  Local infiltration: decadron and ropivacaine    Needle   Needle type: short-bevel   Needle gauge: 22 G  Needle localization: ultrasound guidance  Needle length: 8 cm  Assessment   Injection assessment: negative aspiration for heme, no paresthesia on injection, local visualized surrounding nerve on ultrasound and no intravascular symptoms  Paresthesia pain: none  Slow fractionated injection: yes  Hemodynamics: stable  Outcomes: uncomplicated and patient tolerated procedure well    Additional Notes  18 ml ropivacaine 0.5%+ 6 mg PF dexamethasone for adductor canal  15 ml ropivacaine 0.5%+4 ml PF dexamethasone+10 ml NaCl inj for Ipack

## 2024-12-31 NOTE — PROGRESS NOTES
Herbal and Nutritional Product Restrictions      The following herbal, alternative, and/or nutritional/dietary supplement product(s) has been discontinued per [facility] approved policy:      Vit B12 (Cyanocobalamin) 100mcg PO daily     Please reorder upon discharge if appropriate.    Thank you,  Audelia Cerda [facility]  12/31/2024 11:38 AM

## 2024-12-31 NOTE — CONSULTS
[address] , [place], [place], [address]    Consult / History & Physical Examination Note              Date:   12/31/2024  Patient name:  Rose L Kahler  Date of admission:  12/31/2024  6:16 AM  MRN:   [id number]  YOB: 1940    CHIEF COMPLAINT:     Left knee TKA  History Obtained From:  Patient and chart review.    HPI:     The patient is a 84 y.o.  female who presented today for an elective left knee TKA. She had tried OP non-surgical management for the knee but did not improve at the time. She tolerated the procedure well. The operative note had been reviewed. Her pain was well controlled/managed well post-operatively. Patient denies chest pain, chest pressure/discomfort, claudication, dyspnea, exertional chest pressure/discomfort, fatigue, irregular heart beat, lower extremity edema, near-syncope, orthopnea, palpitations, paroxysmal nocturnal dyspnea, syncope, and tachypnea. She had been taking her medications as prescribed. She was being bridged with Lovenox prior to the procedure. She does have a prior history of a DVT/Factor 5 Leiden and has been on long-term anticoagulation. Prior notes had been reviewed, noted to have a prior hx of right knee TKA/MRSA requiring antibiotics in the past. In the ED, labs and imaging were obtained and were reviewed. The case was discussed with the ED Provider prior to admission.     PAST MEDICAL HISTORY:        has a past medical history of Adenomatous colon polyp, Anxiety, Depression, Diverticulitis, Epidural abscess, Factor V Leiden (HCC), GERD (gastroesophageal reflux disease), History of blood transfusion, History of DVT (deep vein thrombosis), Hyperlipidemia, Impaired fasting glucose, Kidney stones, and MRSA bacteremia.      Diagnosis Date    Adenomatous colon polyp 7/2015    tubular adenoma    Anxiety     Depression     Diverticulitis 2009    Epidural abscess 09/2017    with diskitis / osteomyelitis s/p IV Abx course and L2-3

## 2024-12-31 NOTE — PROGRESS NOTES
Occupational Therapy  Facility/Department: [facility] MED SURG  Occupational Therapy Initial Assessment    Name: Rose L Kahler  : 1940  MRN: [id number]  Date of Service: 2024    Discharge Recommendations:  Continue to assess pending progress          Patient Diagnosis(es): L TKA  Past Medical History:  has a past medical history of Adenomatous colon polyp, Anxiety, Depression, Diverticulitis, Epidural abscess, Factor V Leiden (HCC), GERD (gastroesophageal reflux disease), History of blood transfusion, History of DVT (deep vein thrombosis), Hyperlipidemia, Impaired fasting glucose, Kidney stones, and MRSA bacteremia.  Past Surgical History:  has a past surgical history that includes Colonoscopy (); Colonoscopy (2015); Tubal ligation (); Tonsillectomy (); ventral hernia repair (2016); Cataract removal (Bilateral, ); Total knee arthroplasty (Right, 2017); ovarian cyst removal (); lumbar laminectomy (10/04/2017); Appendectomy (); pr tendon sheath incision (Right, 2018); Nerve Block (Bilateral, 10/31/2024); and Nerve Block (Bilateral, 2024).    Treatment Diagnosis: weakness      Assessment  Performance deficits / Impairments: Decreased functional mobility ;Decreased endurance;Decreased ADL status;Decreased balance;Decreased strength;Decreased high-level IADLs  Assessment: 83 y/o F admitted to [facility] for elective L TKA resulting in increased need for assist during ADL. Patient would benefit from OT services to address to ensure safe return home, alone.  Treatment Diagnosis: weakness  Prognosis: Good  Decision Making: Medium Complexity  REQUIRES OT FOLLOW-UP: Yes     Plan  Occupational Therapy Plan  Times Per Day: Once a day  Days Per Week: 7 Days  Current Treatment Recommendations: Strengthening, Balance training, Functional mobility training, Endurance training, Safety education & training, Patient/Caregiver education & training, Equipment evaluation, education,

## 2024-12-31 NOTE — CONSULTS
Vancomycin Dosing by Pharmacy - Initial Note   TODAY'S DATE:  12/31/2024  Patient name, age:  Rose L Kahler, 84 y.o.    Indication: Surgical prophylaxis / prior hx of right knee TKA/MRSA requiring antibiotics in the past   Additional antimicrobials:  cefazolin for 2 doses    Allergies:  Percocet [oxycodone-acetaminophen]   Actual Weight:    Wt Readings from Last 1 Encounters:   12/31/24 91.6 kg (202 lb)     Labs/Ancillary Data  Estimated Creatinine Clearance: 50 mL/min (based on SCr of 0.9 mg/dL).  Recent Labs     12/31/24  1239   CREATININE 0.9     No results found for: \"PROCAL\"    Intake/Output Summary (Last 24 hours) at 12/31/2024 1437  Last data filed at 12/31/2024 1029  Gross per 24 hour   Intake 700 ml   Output 100 ml   Net 600 ml         Recent vancomycin administrations                     vancomycin (VANCOCIN) 1250 mg in 250 mL IVPB (mg) 1,250 mg New Bag 12/31/24 0755                  Culture Date / Source  /  Results      MRSA Nasal Swab: 12/2/24 negative    PLAN   Vancomycin 1250 mg IV every 24 hours.           ~anticipated  mg/L.hr and concentration trough at steady state 16.5 mg/L  Ensured BUN/sCr ordered at baseline and every 48 hours x at least 3 levels, then at least weekly.  Vancomycin level ordered for 1/2/25 @ 0600. Will use bayesian method for dosing.  This level will not be a trough.  Target AUC/JOSEF: 400-600.    Prior history of right knee TKA/MRSA requiring antibiotics in the past. Patient developed epidural abscess/MRSA requiring surgery approximately 3 weeks post spinal anesthesia for TKA in 2017 at L2-3     Vancomycin Target Concentration Parameters  Treatment  Population Target AUC/JOSEF Target Trough   Invasive MRSA Infection (bacteremia, pneumonia, meningitis, endocarditis, osteomyelitis)  Sepsis (undifferentiated) 400-600 N/A   Infection due to non-MRSA pathogen  Empiric treatment of non-invasive MRSA infection  (SSTI, UTI) <500 10-15 mg/L   CrCl < 29 mL/min  Rapidly fluctuating

## 2024-12-31 NOTE — PROGRESS NOTES
Pt sitting up in chair eating, tolerating food an drink without complications, locked IV at this time, pt denies having pain in the left knee, continuing to monitor aquacel for drainage. Pt remains on room air.

## 2024-12-31 NOTE — PROGRESS NOTES
Physical Therapy  Facility/Department: [facility]G  Physical Therapy Initial Assessment    Name: Rose L Kahler  : 1940  MRN: [id number]  Date of Service: 2024    Discharge Recommendations:  Continue to assess pending progress, Outpatient PT          Patient Diagnosis(es): L TKA  Past Medical History:  has a past medical history of Adenomatous colon polyp, Anxiety, Depression, Diverticulitis, Epidural abscess, Factor V Leiden (HCC), GERD (gastroesophageal reflux disease), History of blood transfusion, History of DVT (deep vein thrombosis), Hyperlipidemia, Impaired fasting glucose, Kidney stones, and MRSA bacteremia.  Past Surgical History:  has a past surgical history that includes Colonoscopy (); Colonoscopy (2015); Tubal ligation (); Tonsillectomy (); ventral hernia repair (2016); Cataract removal (Bilateral, ); Total knee arthroplasty (Right, 2017); ovarian cyst removal (); lumbar laminectomy (10/04/2017); Appendectomy (); pr tendon sheath incision (Right, 2018); Nerve Block (Bilateral, 10/31/2024); and Nerve Block (Bilateral, 2024).    Assessment  Assessment: Pt is a 84 y.o. female s/p L TKA. Upon exam pt able to feel L LE entirely. Displays gross ROM limitation at ~0-90* at L knee. Decreased L LE strength and atalgic gait patter with FWW. Pt with mild unsteadiness when SLS on L LE but no LOB. Pt would benefit from skilled therapy to address these defecits and improve function to prior level.  Treatment Diagnosis: s/p L TKA  Specific Instructions for Next Treatment: once per day on weekends and holidays  Therapy Prognosis: Good  Decision Making: Low Complexity  Requires PT Follow-Up: Yes  Activity Tolerance  Activity Tolerance: Patient tolerated evaluation without incident    Plan  Physical Therapy Plan  General Plan: 2 times a day 7 days a week  Specific Instructions for Next Treatment: once per day on weekends and holidays  Current Treatment  Recommendations: Strengthening, ROM, Balance training, Functional mobility training, Transfer training, Endurance training, Gait training, Stair training, Neuromuscular re-education, Pain management, Home exercise program, Therapeutic activities  Safety Devices  Type of Devices: All fall risk precautions in place, Call light within reach, Left in chair, Chair alarm in place    Restrictions  Restrictions/Precautions  Restrictions/Precautions: Weight Bearing, Fall Risk, General Precautions  Lower Extremity Weight Bearing Restrictions  Left Lower Extremity Weight Bearing: Weight Bearing As Tolerated     Subjective  General  Chart Reviewed: Yes  Patient assessed for rehabilitation services?: Yes         Social/Functional History  Social/Functional History  Lives With: Alone  Type of Home: Apartment  Home Layout: One level  Home Access: Stairs to enter with rails  Entrance Stairs - Number of Steps: 1  Bathroom Shower/Tub: Walk-in shower  Bathroom Toilet: Handicap height  Bathroom Equipment: Shower chair  Home Equipment: Rollator  Has the patient had two or more falls in the past year or any fall with injury in the past year?: No  Prior Level of Assist for ADLs: Independent  Prior Level of Assist for Homemaking: Independent  Prior Level of Assist for Ambulation: Independent household ambulator, with or without device  Prior Level of Assist for Transfers: Independent  Vision/Hearing  Vision  Vision: Impaired  Vision Exceptions: Wears glasses at all times  Hearing  Hearing: Within functional limits    Cognition   Orientation  Overall Orientation Status: Within Normal Limits    Objective  Temp: 97.3 °F (36.3 °C)  Pulse: 93  Heart Rate Source: Monitor;Apical  Respirations: 16  SpO2: 91 %  O2 Device: None (Room air)  BP: 122/66  MAP (Calculated): 85  BP Location: Right upper arm  BP Method: Automatic  Patient Position: Semi fowlers     Observation/Palpation  Posture: Fair       AROM RLE (degrees)  RLE AROM: WNL  AROM LLE

## 2024-12-31 NOTE — DISCHARGE INSTRUCTIONS
ORTHO SURGERY DISCHARGE INSTRUCTIONS    1.  Do not drive or operate hazardous machinery until cleared by your surgeon.    2.  Do not make important personal or business decisions while taking narcotic pain medications.    3.  Do not drink alcoholic beverages.    4.  Do not use tobacco products.    5.  Eat light foods (Jell-O, soups, etc....) and drink plenty of fluids (water, Sprite, etc...) up to 8 glasses per day, as you can tolerate.    6.  If your bandages become soaked with bright red blood, place another dressing pad over your bandages.  (DO NOT remove original bandage.)  Call your surgeon for further instructions.  A small amount of bright red blood is to be expected.    7.  Limit your activities.  Do not engage in heavy work until your surgeon gives you permission.      8.  Report the following signs or any questions regarding your physical condition to your surgeon immediately:    Excessive swelling of, or around the wound area.    Redness.    Temperature of 100 degrees (F) or above.    Excessive pain.    9.  Call your surgeon [phone] for any questions regarding your surgery.  For urgent concerns after office hours, you may call Dr. Artis on his cell phone at [phone].    10. Follow-up with your surgeon as directed.      SPECIAL INSTRUCTIONS AND MEDICATIONS    1.  Move toes to improve circulation.    2.  Continue leg and knee exercises as instructed by Physical Therapy.    3.  Use ibuprofen and/or Tylenol for pain.  You may also use prescribed pain pill (which also includes tylenol) as directed by the doctor.  Do not take more than 3,000 mg tylenol in a day.    4.  Keep your dressing on and dry.  You may shower with use of CHG soap with waterproof Aquacel dressing in place.    5.  Use walker as needed.    6.  Resume Coumadin.

## 2024-12-31 NOTE — CARE COORDINATION
Case Management Assessment  Initial Evaluation    Date/Time of Evaluation: 12/31/2024 1:07 PM  Assessment Completed by: Dyan Dash RN    If patient is discharged prior to next notation, then this note serves as note for discharge by case management.    Patient Name: Rose L Kahler                   YOB: 1940  Diagnosis: Primary localized osteoarthritis of left knee [M17.12]  Primary osteoarthritis of left knee [M17.12]                   Date / Time: 12/31/2024  6:16 AM    Patient Admission Status: Observation   Readmission Risk (Low < 19, Mod (19-27), High > 27): No data recorded  Current PCP: Jorge Streeter MD  PCP verified by CM? (P) Yes    Chart Reviewed: Yes      History Provided by: (P) Patient  Patient Orientation: (P) Alert and Oriented, Person, Place, Situation    Patient Cognition: (P) Alert    Hospitalization in the last 30 days (Readmission):  No    If yes, Readmission Assessment in  Navigator will be completed.    Advance Directives:      Code Status: DNR-CCA   Patient's Primary Decision Maker is: (P) Named in Scanned ACP Document      Discharge Planning:    Patient lives with: (P) Alone Type of Home: (P) Apartment  Primary Care Giver: (P) Self  Patient Support Systems include: (P) Family Members, Friends/Neighbors   Current Financial resources: (P) Medicare  Current community resources: (P) None  Current services prior to admission: (P) Durable Medical Equipment            Current DME: (P) Walker            Type of Home Care services:  (P) None    ADLS  Prior functional level: (P) Independent in ADLs/IADLs  Current functional level: (P) Independent in ADLs/IADLs    PT AM-PAC:   /24  OT AM-PAC: 16 /24    Family can provide assistance at DC: (P) Yes  Would you like Case Management to discuss the discharge plan with any other family members/significant others, and if so, who? (P) No  Plans to Return to Present Housing: (P) Yes  Other Identified Issues/Barriers to RETURNING to

## 2024-12-31 NOTE — CARE COORDINATION
12/31/24 1205   IMM Letter   Observation Status Letter date given: 12/31/24   Observation Status Letter time given: 1204   Observation Status Letter given to Patient/Family/Significant other/Guardian/POA/by: Reviewed with patient and son Dash/Jesse,RN case manager.

## 2024-12-31 NOTE — PROGRESS NOTES
Pt unable to remain on room air, was 85% while sitting in chair on room air, 2L O2 reapplied at this time.

## 2024-12-31 NOTE — ANESTHESIA POSTPROCEDURE EVALUATION
Department of Anesthesiology  Postprocedure Note    Patient: Rose L Kahler  MRN: [id number]  YOB: 1940  Date of evaluation: 12/31/2024    Procedure Summary       Date: 12/31/24 Room / Location: [address]    Anesthesia Start: 0814 Anesthesia Stop: 1037    Procedure: KNEE TOTAL ARTHROPLASTY (Left: Knee) Diagnosis:       Primary localized osteoarthritis of left knee      (Primary localized osteoarthritis of left knee [M17.12])    Surgeons: Levy Artis MD Responsible Provider: Sara Jara APRN - CRNA    Anesthesia Type: general ASA Status: 3            Anesthesia Type: No value filed.    Matthew Phase I: Matthew Score: 8    Matthew Phase II:      Anesthesia Post Evaluation    Patient location during evaluation: PACU  Patient participation: complete - patient participated  Level of consciousness: awake and alert  Pain score: 4  Airway patency: patent  Nausea & Vomiting: no nausea and no vomiting  Cardiovascular status: blood pressure returned to baseline  Respiratory status: acceptable and nasal cannula  Hydration status: stable  Comments: Ready for discharge from PACU to inpatient room  Multimodal analgesia pain management approach  Pain management: adequate and satisfactory to patient    No notable events documented.

## 2024-12-31 NOTE — PROGRESS NOTES
Discharge Criteria    Inpatients must meet Criteria 1 through 7. All other patients are either YES or N/A. If a NO is chosen then Anesthesia or Surgeon must be notified.      1.  Minimum 30 minutes after last dose of sedative medication.    Yes      2.  Systolic BP between 90 - 160. Diastolic BP between 60 - 90.    No, anesthesia aware, okay to take pt to [facility].      3.  Pulse between 60 - 120    Yes      4.  Respirations between 8 - 25.    Yes      5.  SpO2 92% - 100%.    Yes      6.  Able to cough and swallow or return to baseline function.    Yes      7.  Alert and oriented or return to baseline mental status.    Yes      8.  Demonstrates controlled, coordinated movements, ambulates with steady gait, or return to baseline activity function.    N/A      9.  Minimal or no pain or nausea, or at a level tolerable and acceptable to patient.    N/A      10. Takes and retains oral fluids as allowed.    N/A      11. Procedural / perioperative site stable.  Minimal or no bleeding.    N/A          12. If GI endoscopy procedure, minimal or no abdominal distention or passing flatus.    N/A      13. Written discharge instructions and emergency telephone number provided.    N/A      14. Accompanied by a responsible adult.    N/A

## 2024-12-31 NOTE — OP NOTE
Operative Note      Patient: Rose L Kahler  YOB: 1940  MRN: [id number]    DATE OF SURGERY: 12/31/2024    PRE-OPERATIVE DIAGNOSIS:   Left knee primary osteoarthritis    POST-OPERATIVE DIAGNOSIS:   Same    PROCEDURE:   Left total knee arthroplasty    SURGEON: Levy Artis M.D.    ASSISTANT: Yajaira Dewitt    ANESTHESIA: general and regional with NIKO    COMPLICATIONS: None    EBL: 150 mL    TOURNIQUET TIME:  47 minutes at 250 mm Hg    IMPLANTS: [employer] size 4 CR Oxinium femur, size 4 tibia, 15 mm XLPE High Flex CR polyethylene, 35 mm oval patella.      INDICATIONS: Patient presented with chronic, progressive knee pain that failed to improve with non-surgical measures. Patient had pain that limited quality of life and elected to undergo total knee replacement. Informed consent was obtained following discussion of risks and benefits.     DESCRIPTION OF PROCEDURE: The patient was identified in the preoperative holding area. With the patient's agreement, the operative knee was marked as for the site of surgery. The patient was taken to the operating room. Anesthesia was administered, and the patient was then placed supine on the operating room table. Prophylactic IV antibiotics were administered. A well-padded tourniquet was applied to the patient's operative thigh. The operative extremity was then prepped and draped in the usual sterile fashion.     Preoperative timeout was taken to ensure the proper patient, surgical site and procedure. After all parties in agreement, we began by creating a longitudinal incision over the anterior aspect of the knee. Sharp dissection was carried down through the skin and subcutaneous fat. Hemostasis was secured with electrocautery. Medial parapatellar arthrotomy was then created. A portion of the fat pad was excised to allow for lateral visualization. The ACL was then excised. The patient-specific distal femoral cutting block was then secured to the distal femur  [id number] Left 1 Implanted   INSERT TIB SZ 3-4 [id number] KNEE XLPE CRUCE RET HI FLX [facility] - [id number]  INSERT TIB SZ 3-4 [id number] KNEE XLPE CRUCE RET HI FLX [facility]  [employer]- [id number] Left 1 Implanted           Electronically signed by Levy Artis MD on 12/31/2024 at 10:31 AM

## 2024-12-31 NOTE — ANESTHESIA PRE PROCEDURE
Department of Anesthesiology  Preprocedure Note       Name:  Rose L Kahler   Age:  84 y.o.  :  1940                                          MRN:  [id number]         Date:  2024      Surgeon: Surgeon(s):  Levy Artis MD    Procedure: Procedure(s):  KNEE TOTAL ARTHROPLASTY (Left: Knee)     Medications prior to admission:   Prior to Admission medications    Medication Sig Start Date End Date Taking? Authorizing Provider   HYDROcodone-acetaminophen (NORCO) 5-325 MG per tablet Take 1 tablet by mouth every 6 hours as needed for Pain. 24  Yes Kirk Petersen MD   enoxaparin (LOVENOX) 100 MG/ML Inject 0.9 mLs into the skin 2 times daily 24  Yes Jorge Streeter MD   nystatin (MYCOSTATIN) 400237 UNIT/GM cream APPLY TO THE AFFECTED AREA(S) 2 TIMES A DAY 24  Yes Jorge Streeter MD   nystatin (MYCOSTATIN) 381439 UNIT/GM powder Apply 3 times daily. 10/28/24  Yes Jorge Streeter MD   clotrimazole-betamethasone (LOTRISONE) 1-0.05 % cream clotrimazole-betamethasone (LOTRISONE) 1-0.05 % cream Indications: Intertrigo APPLY TOPICALLY TWO TIMES A DAY AS NEEDED FOR SKIN RASH. NOT TO EXCEED 6 WEEKS OF CONTINUOUS USAGE 45 g 1 2022 Active 22  Yes Kirk Petersen MD   RESTASIS 0.05 % ophthalmic emulsion Place into both eyes daily 22  Yes Kirk Petersen MD   acetaminophen (TYLENOL) 500 MG tablet Take 1 tablet by mouth every 4 hours as needed   Yes Kirk Petersen MD   Semaglutide (OZEMPIC, 1 MG/DOSE, SC) Inject 7 mg into the skin once a week    Kirk Petersen MD   DULoxetine (CYMBALTA) 60 MG extended release capsule Take 1 capsule by mouth daily 24   Jorge Streeter MD   rosuvastatin (CRESTOR) 10 MG tablet Take 1 tablet by mouth nightly  Patient taking differently: Take 1 tablet by mouth every other day 24   Hattie Estrella APRN - CNP   warfarin (COUMADIN) 7.5 MG tablet Coumadin clinic:  3.75 mg po every Friday; 7.5 mg po all other

## 2024-12-31 NOTE — PROGRESS NOTES
Upon chart review and speaking with patient, patient developed epidural abscess/MRSA requiring surgery approximately 3 weeks post spinal anesthesia for TKA in 2017 at L2-3.  Therefore plan for GA today to left TKA.  Discussed with Dr. Artis and patient with agreement to proceed with GA

## 2024-12-31 NOTE — CONSULTS
[facility]  Pharmacy Department    Clinical Pharmacy Note-Warfarin Consult    Rose L Kahler is a 84 y.o. female for whom pharmacy has been asked to manage warfarin therapy.     Consulting Physician: Dr. Brothers  Reason for Admission: LTKA 12/31/24    Warfarin dose prior to admission: 3.75 mg po every Monday and Friday; 7.5 mg po all other days  Warfarin indication: H/O DVT  Target INR range: 2-3     Past Medical History:   Diagnosis Date    Adenomatous colon polyp 7/2015    tubular adenoma    Anxiety     Depression     Diverticulitis 2009    Epidural abscess 09/2017    with diskitis / osteomyelitis s/p IV Abx course and L2-3 laminectomy    Factor V Leiden (HCC)     GERD (gastroesophageal reflux disease)     History of blood transfusion     no reaction    History of DVT (deep vein thrombosis)     DVT right leg    Hyperlipidemia     Impaired fasting glucose     Kidney stones     MRSA bacteremia 09/23/2017                  INR (no units)   Date Value   12/31/2024 1.0   07/02/2024 2.3   05/07/2024 2.4   03/26/2024 2.5   02/13/2024 3   01/22/2024 1.8   12/22/2023 3.0     INR,(POC) (no units)   Date Value   12/24/2024 2.4   11/21/2024 1.9   10/16/2024 2.2   09/16/2024 2.7   08/14/2024 3.2       Hemoglobin (g/dL)   Date Value   12/02/2024 14.1   10/16/2024 14.6   05/24/2024 14.8     Hematocrit (%)   Date Value   12/02/2024 43.2   10/16/2024 46.5   05/24/2024 45.7     Platelets (k/uL)   Date Value   12/02/2024 271   10/16/2024 238   05/24/2024 252       Current warfarin drug-drug interactions: celecoxib      Date             INR        Dose   12/31/2024            1       7.5 mg    Lovenox 1 mg/kg (90 mg) SUBQ BID until INR therapeutic     Daily PT/INR until stable within therapeutic range.     Thank you for the consult.     Allison Deng [facility],  12/31/2024, 1:29 PM

## 2025-01-01 LAB
ALBUMIN SERPL-MCNC: 3.4 G/DL (ref 3.5–5.2)
ALBUMIN/GLOB SERPL: 1.4 {RATIO} (ref 1–2.5)
ALP SERPL-CCNC: 65 U/L (ref 35–104)
ALT SERPL-CCNC: 21 U/L (ref 10–35)
ANION GAP SERPL CALCULATED.3IONS-SCNC: 9 MMOL/L (ref 9–16)
AST SERPL-CCNC: 24 U/L (ref 10–35)
BILIRUB SERPL-MCNC: 0.2 MG/DL (ref 0–1.2)
BUN SERPL-MCNC: 22 MG/DL (ref 8–23)
BUN/CREAT SERPL: 22 (ref 9–20)
CALCIUM SERPL-MCNC: 8.3 MG/DL (ref 8.6–10.4)
CHLORIDE SERPL-SCNC: 106 MMOL/L (ref 98–107)
CO2 SERPL-SCNC: 23 MMOL/L (ref 20–31)
CREAT SERPL-MCNC: 1 MG/DL (ref 0.5–0.9)
GFR, ESTIMATED: 57 ML/MIN/1.73M2
GLUCOSE SERPL-MCNC: 136 MG/DL (ref 74–99)
HCT VFR BLD AUTO: 35.5 % (ref 36.3–47.1)
HGB BLD-MCNC: 11.4 G/DL (ref 11.9–15.1)
INR PPP: 1.1
POTASSIUM SERPL-SCNC: 4.8 MMOL/L (ref 3.7–5.3)
PROT SERPL-MCNC: 5.9 G/DL (ref 6.6–8.7)
PROTHROMBIN TIME: 13.4 SEC (ref 11.7–14.1)
SODIUM SERPL-SCNC: 138 MMOL/L (ref 136–145)

## 2025-01-01 PROCEDURE — G0378 HOSPITAL OBSERVATION PER HR: HCPCS

## 2025-01-01 PROCEDURE — 6370000000 HC RX 637 (ALT 250 FOR IP): Performed by: STUDENT IN AN ORGANIZED HEALTH CARE EDUCATION/TRAINING PROGRAM

## 2025-01-01 PROCEDURE — 96374 THER/PROPH/DIAG INJ IV PUSH: CPT

## 2025-01-01 PROCEDURE — 97116 GAIT TRAINING THERAPY: CPT

## 2025-01-01 PROCEDURE — 85610 PROTHROMBIN TIME: CPT

## 2025-01-01 PROCEDURE — 80048 BASIC METABOLIC PNL TOTAL CA: CPT

## 2025-01-01 PROCEDURE — 6370000000 HC RX 637 (ALT 250 FOR IP): Performed by: ORTHOPAEDIC SURGERY

## 2025-01-01 PROCEDURE — 96372 THER/PROPH/DIAG INJ SC/IM: CPT

## 2025-01-01 PROCEDURE — 6360000002 HC RX W HCPCS: Performed by: STUDENT IN AN ORGANIZED HEALTH CARE EDUCATION/TRAINING PROGRAM

## 2025-01-01 PROCEDURE — 2500000003 HC RX 250 WO HCPCS: Performed by: ORTHOPAEDIC SURGERY

## 2025-01-01 PROCEDURE — 97535 SELF CARE MNGMENT TRAINING: CPT

## 2025-01-01 PROCEDURE — 36415 COLL VENOUS BLD VENIPUNCTURE: CPT

## 2025-01-01 PROCEDURE — 85014 HEMATOCRIT: CPT

## 2025-01-01 PROCEDURE — 97110 THERAPEUTIC EXERCISES: CPT

## 2025-01-01 PROCEDURE — 6360000002 HC RX W HCPCS: Performed by: ORTHOPAEDIC SURGERY

## 2025-01-01 PROCEDURE — 85018 HEMOGLOBIN: CPT

## 2025-01-01 PROCEDURE — 80053 COMPREHEN METABOLIC PANEL: CPT

## 2025-01-01 PROCEDURE — 94761 N-INVAS EAR/PLS OXIMETRY MLT: CPT

## 2025-01-01 RX ORDER — WARFARIN SODIUM 5 MG/1
10 TABLET ORAL
Status: COMPLETED | OUTPATIENT
Start: 2025-01-01 | End: 2025-01-01

## 2025-01-01 RX ADMIN — HYDROCODONE BITARTRATE AND ACETAMINOPHEN 1 TABLET: 5; 325 TABLET ORAL at 01:49

## 2025-01-01 RX ADMIN — EZETIMIBE 10 MG: 10 TABLET ORAL at 08:10

## 2025-01-01 RX ADMIN — WARFARIN SODIUM 10 MG: 5 TABLET ORAL at 17:31

## 2025-01-01 RX ADMIN — MORPHINE SULFATE 4 MG: 4 INJECTION, SOLUTION INTRAMUSCULAR; INTRAVENOUS at 11:15

## 2025-01-01 RX ADMIN — ENOXAPARIN SODIUM 90 MG: 100 INJECTION SUBCUTANEOUS at 20:59

## 2025-01-01 RX ADMIN — Medication 1000 UNITS: at 08:10

## 2025-01-01 RX ADMIN — ENOXAPARIN SODIUM 90 MG: 100 INJECTION SUBCUTANEOUS at 08:10

## 2025-01-01 RX ADMIN — VANCOMYCIN 1250 MG: 1.75 INJECTION, SOLUTION INTRAVENOUS at 09:27

## 2025-01-01 RX ADMIN — DULOXETINE HYDROCHLORIDE 60 MG: 60 CAPSULE, DELAYED RELEASE ORAL at 08:10

## 2025-01-01 RX ADMIN — HYDROCODONE BITARTRATE AND ACETAMINOPHEN 1 TABLET: 5; 325 TABLET ORAL at 07:35

## 2025-01-01 RX ADMIN — SODIUM CHLORIDE, PRESERVATIVE FREE 10 ML: 5 INJECTION INTRAVENOUS at 21:02

## 2025-01-01 RX ADMIN — SODIUM CHLORIDE, PRESERVATIVE FREE 10 ML: 5 INJECTION INTRAVENOUS at 09:29

## 2025-01-01 RX ADMIN — DOCUSATE SODIUM 50 MG AND SENNOSIDES 8.6 MG 1 TABLET: 8.6; 5 TABLET, FILM COATED ORAL at 20:59

## 2025-01-01 RX ADMIN — DOCUSATE SODIUM 50 MG AND SENNOSIDES 8.6 MG 1 TABLET: 8.6; 5 TABLET, FILM COATED ORAL at 08:10

## 2025-01-01 RX ADMIN — Medication 2000 MG: at 01:40

## 2025-01-01 RX ADMIN — HYDROCODONE BITARTRATE AND ACETAMINOPHEN 2 TABLET: 5; 325 TABLET ORAL at 17:29

## 2025-01-01 ASSESSMENT — PAIN DESCRIPTION - PAIN TYPE
TYPE: SURGICAL PAIN

## 2025-01-01 ASSESSMENT — PAIN - FUNCTIONAL ASSESSMENT
PAIN_FUNCTIONAL_ASSESSMENT: ACTIVITIES ARE NOT PREVENTED

## 2025-01-01 ASSESSMENT — PAIN DESCRIPTION - ONSET
ONSET: ON-GOING

## 2025-01-01 ASSESSMENT — PAIN DESCRIPTION - LOCATION
LOCATION: KNEE

## 2025-01-01 ASSESSMENT — PAIN DESCRIPTION - ORIENTATION
ORIENTATION: LEFT
ORIENTATION: MID
ORIENTATION: MID
ORIENTATION: LEFT
ORIENTATION: LEFT

## 2025-01-01 ASSESSMENT — PAIN DESCRIPTION - FREQUENCY
FREQUENCY: CONTINUOUS

## 2025-01-01 ASSESSMENT — PAIN DESCRIPTION - DESCRIPTORS
DESCRIPTORS: STABBING
DESCRIPTORS: ACHING
DESCRIPTORS: ACHING
DESCRIPTORS: SHARP
DESCRIPTORS: ACHING

## 2025-01-01 ASSESSMENT — PAIN SCALES - GENERAL
PAINLEVEL_OUTOF10: 1
PAINLEVEL_OUTOF10: 4
PAINLEVEL_OUTOF10: 8
PAINLEVEL_OUTOF10: 3
PAINLEVEL_OUTOF10: 7

## 2025-01-01 NOTE — PROGRESS NOTES
Physical Therapy  Facility/Department: [facility] MED SURG  Daily Treatment Note  NAME: Rose L Kahler  : 1940  MRN: [id number]    Date of Service: 2025    Discharge Recommendations:  Continue to assess pending progress, Outpatient PT        Assessment  Assessment: supine isometrics x 10--QS, GS and ankle pumps. seated B LE therex x 10--LAQ, hip abd, marches. Standing B LE therex x 5 with B UE assist--marches, calf raises, hip abd, HS curls, mini squats. WBAT L LE transfers--Gallo, gait with RW with CGA with cues for heel/toe gait pattern, step through gait pattern and upright posture. pt instructed on HEP to be continued 2x per day + isometrics 10x every hour. pt verbalized understanding. pt is set up for OP therapy at [facility] beginning 25 per her report. pt left with belongings, ice pack on L knee and LE's elevated.  Activity Tolerance: Patient tolerated treatment well    Plan  Physical Therapy Plan  General Plan: 2 times a day 7 days a week (1x per day on weekends and holidays.)  Current Treatment Recommendations: Strengthening;ROM;Balance training;Functional mobility training;Transfer training;Endurance training;Gait training;Stair training;Neuromuscular re-education;Pain management;Home exercise program;Therapeutic activities    Restrictions  Restrictions/Precautions  Restrictions/Precautions: Weight Bearing, Fall Risk, General Precautions  Lower Extremity Weight Bearing Restrictions  Left Lower Extremity Weight Bearing: Weight Bearing As Tolerated     Subjective   Orientation  Overall Orientation Status: Within Functional Limits    Objective  Bed Mobility Training  Bed Mobility Training: No  Transfer Training  Transfer Training: Yes  Overall Level of Assistance: Assist X1;Minimum assistance  Interventions: Demonstration;Verbal cues (Cues for forward COG over INDRA, optimal foot placement.)  Sit to Stand: Assist X1;Minimum assistance  Stand to Sit: Assist X1;Contact-guard assistance  Stand Pivot Transfers:

## 2025-01-01 NOTE — PROGRESS NOTES
Writer to bedside to complete morning assessment. Upon entry to room, pt alert up to chair, respirations even while on room air. Vitals obtained and assessment completed, see flow sheet for details. Pt denies needs from writer at this time. Call light in reach. Care ongoing.

## 2025-01-01 NOTE — PROGRESS NOTES
Department of Orthopedic Surgery  Progress Note    Subjective:  No complaints.  Doing well postoperatively.  Had one episode of breakthrough pain last night which responded to dose of morphine.  Since then, pain has been well-controlled with oral analgesics alone.  Pain is currently 2 out of 10.  Worse with movement and after PT.  Better with rest and oral analgesics.  Had bleeding and saturation of post-op dressing last night.    Vitals  VITALS:  /62   Pulse 68   Temp 98.1 °F (36.7 °C) (Temporal)   Resp 16   Ht 1.6 m (5' 3\")   Wt 93.8 kg (206 lb 11.2 oz)   SpO2 99%   BMI 36.62 kg/m²     PHYSICAL EXAM:  General: in no apparent distress, well developed and well nourished, alert, and oriented times 3  Left Lower Extremity  Incision:  Aquacel dressing in place and saturated.  Dressing removed.  Incision is well-approximated without dehiscence.  [place] intact.  No blood expressible.  Incision was lightly swabbed with alcohol and new Aquacel dressing placed.  Neurologic:  Moving lower extremity as appropriate following sugery.  Able to dorsiflex and plantar flex foot/ankle.  Intact to gross sensation and touch in lower extremity.  Vascular: present 2+ lower extremity.  Calf soft, non-tender. No evidence of DVT seen on physical exam.  Negative Renaldo's sign.  No cords or calf tenderness.  Abnormal Exam findings:  none    LABS:  Hgb:    Lab Results   Component Value Date/Time    HGB 11.4 01/01/2025 05:30 AM     CMP:    Lab Results   Component Value Date/Time     01/01/2025 05:30 AM    K 4.8 01/01/2025 05:30 AM     01/01/2025 05:30 AM    CO2 23 01/01/2025 05:30 AM    BUN 22 01/01/2025 05:30 AM    CREATININE 1.0 01/01/2025 05:30 AM    GFRAA >60 09/30/2022 12:25 PM    LABGLOM 57 01/01/2025 05:30 AM    LABGLOM >60 01/02/2024 08:07 AM    GLUCOSE 136 01/01/2025 05:30 AM    GLUCOSE 110 05/22/2012 07:45 AM    CALCIUM 8.3 01/01/2025 05:30 AM    BILITOT 0.2 01/01/2025 05:30 AM    ALKPHOS 65 01/01/2025 05:30

## 2025-01-01 NOTE — PROGRESS NOTES
[address] , [place], [place], [address]    Progress Note    Date:   1/1/2025  Patient name:  Rose L Kahler  Date of admission:  12/31/2024  6:16 AM  MRN:   [id number]  YOB: 1940    SUBJECTIVE/Last 24 hours update:     Patient seen and examined at the bed side , no new acute events overnight except for the left knee dressing noted to have some saturation/bleeding noted and no new complains.  VSS, afebrile. Noted some hypoxia that had since resolved with reposition of the probe. She is passing gas, no BM yet. Notes from nursing staff and Consults had been reviewed, and the overnight progress had been checked with the nursing staff as well.    REVIEW OF SYSTEMS:      CONSTITUTIONAL:  no fevers, no headcahes  EYES: negative for blury vision  HEENT: No headaches, No nasal congestion, no difficulty swallowing  RESPIRATORY:negative for dyspnea, no wheezing, no Cough  CARDIOVASCULAR: negative for chest pain, no palpitations  GASTROINTESTINAL: no nausea, no vomiting, no change in bowel habits, no abdominal pain   GENITOURINARY: negative for dysuria, no hematuria   MUSCULOSKELETAL: no joint pains, no muscle aches, no swelling of joints or extremities, minimal knee pain.  NEUROLOGICAL: No  Weakness or numbness      PAST MEDICAL HISTORY:      has a past medical history of Adenomatous colon polyp, Anxiety, Depression, Diverticulitis, Epidural abscess, Factor V Leiden (HCC), GERD (gastroesophageal reflux disease), History of blood transfusion, History of DVT (deep vein thrombosis), Hyperlipidemia, Impaired fasting glucose, Kidney stones, and MRSA bacteremia.    PAST SURGICAL HISTORY:      has a past surgical history that includes Colonoscopy (2011); Colonoscopy (7/23/2015); Tubal ligation (1978); Tonsillectomy (1950); ventral hernia repair (04/22/2016); Cataract removal (Bilateral, 2015); Total knee arthroplasty (Right, 08/02/2017); ovarian cyst removal (1976); lumbar laminectomy        Or    morphine injection 4 mg  4 mg IntraVENous Q2H PRN Levy Artis MD   4 mg at 12/31/24 1903    ondansetron (ZOFRAN-ODT) disintegrating tablet 4 mg  4 mg Oral Q8H PRN Levy Artis MD        Or    ondansetron (ZOFRAN) injection 4 mg  4 mg IntraVENous Q6H PRN Levy Artis MD        sennosides-docusate sodium (SENOKOT-S) 8.6-50 MG tablet 1 tablet  1 tablet Oral BID Levy Artis MD   1 tablet at 01/01/25 0810    acetaminophen (TYLENOL) tablet 650 mg  650 mg Oral Q6H PRN Levy Artis MD        HYDROcodone-acetaminophen (NORCO) 5-325 MG per tablet 1 tablet  1 tablet Oral Q4H PRN Levy Artis MD   1 tablet at 01/01/25 0735    Or    HYDROcodone-acetaminophen (NORCO) 5-325 MG per tablet 2 tablet  2 tablet Oral Q4H PRN Levy Artis MD   2 tablet at 12/31/24 2030    warfarin placeholder: dosing by pharmacy   Other RX Placeholder Perry Brothers MD        vancomycin (VANCOCIN) 1250 mg in 250 mL IVPB  1,250 mg IntraVENous Q24H Perry Brothers .7 mL/hr at 01/01/25 0927 1,250 mg at 01/01/25 0927       ASSESSMENT:     Principal Problem:    Primary osteoarthritis of left knee  Active Problems:    Long term current use of anticoagulant therapy    Factor V Leiden (HCC)    Hyperlipidemia    History of DVT (deep vein thrombosis)    Major depressive disorder, recurrent, mild  Resolved Problems:    * No resolved hospital problems. *      PLAN:     Primary Problem(s): Primary osteoarthritis of left knee  Condition is at treatment goal  Treatment plan: Continue current treatment  Imaging: no further imaging studies ordered today  Medications: Continue current meds  Medication Monitoring / High Risk Medications:  Vanco during hospitalization    Orthopedics is following closely  Wound care as indicated  Monitor h/h, renal function  Lovenox to be resumed as discussed/per plans    Above plan discussed with the patient who agreed to the above plan     Discussed care plan with nurse after getting their input.    Please note that this chart

## 2025-01-01 NOTE — PLAN OF CARE
Problem: Discharge Planning  Goal: Discharge to home or other facility with appropriate resources  1/1/2025 1004 by Mayelin Estrella RN  Outcome: Progressing     Problem: Pain  Goal: Verbalizes/displays adequate comfort level or baseline comfort level  1/1/2025 1004 by Mayelin Estrella RN  Outcome: Progressing     Problem: Safety - Adult  Goal: Free from fall injury  1/1/2025 1004 by Mayelin Estrella RN  Outcome: Progressing     Problem: ABCDS Injury Assessment  Goal: Absence of physical injury  1/1/2025 1004 by Mayelin Estrella RN  Outcome: Progressing     Problem: Musculoskeletal - Adult  Goal: Return mobility to safest level of function  1/1/2025 1004 by Mayelin Estrella RN  Outcome: Progressing     Problem: Musculoskeletal - Adult  Goal: Maintain proper alignment of affected body part  1/1/2025 1004 by Mayelin Estrella RN  Outcome: Progressing

## 2025-01-01 NOTE — PLAN OF CARE
Problem: Discharge Planning  Goal: Discharge to home or other facility with appropriate resources  Outcome: Progressing     Problem: Pain  Goal: Verbalizes/displays adequate comfort level or baseline comfort level  Outcome: Progressing     Problem: Safety - Adult  Goal: Free from fall injury  Outcome: Progressing     Problem: ABCDS Injury Assessment  Goal: Absence of physical injury  Outcome: Progressing     Problem: Musculoskeletal - Adult  Goal: Return mobility to safest level of function  Outcome: Progressing  Goal: Maintain proper alignment of affected body part  Outcome: Progressing

## 2025-01-01 NOTE — PROGRESS NOTES
[facility]  Pharmacy Department    Clinical Pharmacy Note-Warfarin Follow-up       Patient:  Rose L Kahler  MRN: [id number]    Warfarin consult follow-up:    INR (no units)   Date Value   01/01/2025 1.1   12/31/2024 1.0   07/02/2024 2.3   05/07/2024 2.4   03/26/2024 2.5   02/13/2024 3   01/22/2024 1.8     INR,(POC) (no units)   Date Value   12/24/2024 2.4   11/21/2024 1.9   10/16/2024 2.2   09/16/2024 2.7   08/14/2024 3.2       Hemoglobin (g/dL)   Date Value   01/01/2025 11.4 (L)   12/02/2024 14.1   10/16/2024 14.6     Hematocrit (%)   Date Value   01/01/2025 35.5 (L)   12/02/2024 43.2   10/16/2024 46.5     Platelets (k/uL)   Date Value   12/02/2024 271   10/16/2024 238   05/24/2024 252       Target INR Range: 2.0-3.0    Significant Drug-Drug Interactions:  New warfarin drug-drug interactions: no change, continue therapeutic enoxaparin  Discontinued drug-drug interactions: no change      Notes:     patient to take warfarin 10mg po today.     Daily PT/INR until stable within therapeutic range.     Thank you,  Marleny Oro [facility],   1/1/2025, 7:29 AM

## 2025-01-01 NOTE — PROGRESS NOTES
Vancomycin Dosing by Pharmacy - Daily Note   Vancomycin Therapy Day:  day 2  Indication: surgical prophylaxis/ prior hx of right knee TKA/MRSA requiring abx    Allergies:  Percocet [oxycodone-acetaminophen]   Actual Weight:    Wt Readings from Last 1 Encounters:   01/01/25 93.8 kg (206 lb 11.2 oz)       Labs/Ancillary Data  Estimated Creatinine Clearance: 46 mL/min (A) (based on SCr of 1 mg/dL (H)).  Recent Labs     12/31/24  1239 01/01/25  0530   CREATININE 0.9 1.0*   BUN  --  22     No results found for: \"PROCAL\"    Intake/Output Summary (Last 24 hours) at 1/1/2025 1027  Last data filed at 1/1/2025 0927  Gross per 24 hour   Intake 2890 ml   Output 1000 ml   Net 1890 ml         Recent vancomycin administrations                     vancomycin (VANCOCIN) 1250 mg in 250 mL IVPB (mg) 1,250 mg New Bag 01/01/25 0927    vancomycin (VANCOCIN) 1250 mg in 250 mL IVPB (mg) 1,250 mg New Bag 12/31/24 0755                      PLAN   Creatinine slight increase today  Anticipate dose adjustment based on random vancomycin level and creatinine tomorrow (today's dose completed).  Current dosing is vancomycin 1250mg IV q24hr  Labs ordered per protocol      Vancomycin Target Concentration Parameters  Treatment  Population Target AUC/JOSEF Target Trough   Invasive MRSA Infection (bacteremia, pneumonia, meningitis, endocarditis, osteomyelitis)  Sepsis (undifferentiated) 400-600 N/A   Infection due to non-MRSA pathogen  Empiric treatment of non-invasive MRSA infection  (SSTI, UTI) <500 10-15 mg/L   CrCl < 29 mL/min  Rapidly fluctuating serum creatinine   LETICIA N/A < 15 mg/L     Renal replacement therapy is dosed by levels, per hospital protocol.  Abbreviations  * Pauc: probability that AUC is >400 (efficacy); Pconc: probability that Ctrough is above 20 ?g/mL (toxicity); Tox: Probability of nephrotoxicity, based on Gene et al. Clin Infect Dis 2009.    Thank you for the consult.  Pharmacy will continue to follow.  Marleny Oro, R.Ph.,  1/1/2025,10:39 AM

## 2025-01-01 NOTE — PROGRESS NOTES
Occupational Therapy  Facility/Department: [facility] MED SURG  Daily Treatment Note  NAME: Rose L Kahler  : 1940  MRN: [id number]    Date of Service: 2025    Discharge Recommendations:  Continue to assess pending progress         Patient Diagnosis(es):  L TKA    Assessment   Activity Tolerance: Patient tolerated treatment well  Discharge Recommendations: Continue to assess pending progress     Plan  Occupational Therapy Plan  Times Per Day: Once a day  Days Per Week: 7 Days  Current Treatment Recommendations: Strengthening;Balance training;Functional mobility training;Endurance training;Safety education & training;Patient/Caregiver education & training;Equipment evaluation, education, & procurement;Self-Care / ADL    Restrictions  Restrictions/Precautions  Restrictions/Precautions: Weight Bearing, Fall Risk, General Precautions  Lower Extremity Weight Bearing Restrictions  Left Lower Extremity Weight Bearing: Weight Bearing As Tolerated     Subjective  Subjective  Subjective: Pt in bed, requesting restroom. Pt agreed to washing up sinkside.  Pain: 4/10 post surgical pain, increase with WBing/walking          Objective  Vitals           ADL  Grooming: Setup;Stand by assistance  Grooming Skilled Clinical Factors: seated  UE Bathing: Setup;Stand by assistance  UE Bathing Skilled Clinical Factors: seated  LE Bathing: Moderate assistance  LE Bathing Skilled Clinical Factors: seated/standing  UE Dressing: Setup;Stand by assistance  LE Dressing: Maximum assistance  Putting On/Taking Off Footwear: Maximum assistance  Toileting: Moderate assistance  Toileting Skilled Clinical Factors: CGA BSC transfers; patient MOD A for hygiene and clothing management.  Functional Mobility: Contact guard assistance  Functional Mobility Skilled Clinical Factors: FWW  Additional Comments: CGA ADL transfers  Product Used : Soap and water        Safety Devices  Type of Devices: All fall risk precautions in place;Call light within  reach;Chair alarm in place;Left in chair    Patient Education  Education Given To: Patient  Education Provided: Role of Therapy;Plan of Care;ADL Adaptive Strategies;Transfer Training;Fall Prevention Strategies;Mobility Training  Education Method: Demonstration;Verbal  Barriers to Learning: None  Education Outcome: Demonstrated understanding    Goals  Short Term Goals  Time Frame for Short Term Goals: 21 visits  Short Term Goal 1: Patient to complete ADL routine c mod I c use of AE/DME as needed to ensure safe return home.  Short Term Goal 2: Patient to be educated on d/c folder, AE/DME and basic home safety to ensure safe and independent return home.    AM-PAC - ADL       Therapy Time   Individual Concurrent Group Co-treatment   Time In 0817         Time Out 0841         Minutes 24                 SARAH Green

## 2025-01-02 VITALS
DIASTOLIC BLOOD PRESSURE: 73 MMHG | BODY MASS INDEX: 37.03 KG/M2 | RESPIRATION RATE: 17 BRPM | SYSTOLIC BLOOD PRESSURE: 126 MMHG | HEIGHT: 63 IN | OXYGEN SATURATION: 95 % | HEART RATE: 88 BPM | WEIGHT: 209 LBS | TEMPERATURE: 97.6 F

## 2025-01-02 LAB
ALBUMIN SERPL-MCNC: 3.4 G/DL (ref 3.5–5.2)
ALBUMIN/GLOB SERPL: 1.4 {RATIO} (ref 1–2.5)
ALP SERPL-CCNC: 65 U/L (ref 35–104)
ALT SERPL-CCNC: 12 U/L (ref 10–35)
ANION GAP SERPL CALCULATED.3IONS-SCNC: 7 MMOL/L (ref 9–16)
AST SERPL-CCNC: 17 U/L (ref 10–35)
BILIRUB SERPL-MCNC: 0.3 MG/DL (ref 0–1.2)
BUN SERPL-MCNC: 18 MG/DL (ref 8–23)
BUN/CREAT SERPL: 20 (ref 9–20)
CALCIUM SERPL-MCNC: 8.2 MG/DL (ref 8.6–10.4)
CHLORIDE SERPL-SCNC: 106 MMOL/L (ref 98–107)
CO2 SERPL-SCNC: 25 MMOL/L (ref 20–31)
CREAT SERPL-MCNC: 0.9 MG/DL (ref 0.5–0.9)
GFR, ESTIMATED: 65 ML/MIN/1.73M2
GLUCOSE SERPL-MCNC: 125 MG/DL (ref 74–99)
INR PPP: 1.3
POTASSIUM SERPL-SCNC: 4.3 MMOL/L (ref 3.7–5.3)
PROT SERPL-MCNC: 5.8 G/DL (ref 6.6–8.7)
PROTHROMBIN TIME: 15.8 SEC (ref 11.7–14.1)
SODIUM SERPL-SCNC: 138 MMOL/L (ref 136–145)
VANCOMYCIN SERPL-MCNC: 6.4 UG/ML (ref 5–40)

## 2025-01-02 PROCEDURE — 2500000003 HC RX 250 WO HCPCS: Performed by: ORTHOPAEDIC SURGERY

## 2025-01-02 PROCEDURE — 6360000002 HC RX W HCPCS: Performed by: ORTHOPAEDIC SURGERY

## 2025-01-02 PROCEDURE — 96372 THER/PROPH/DIAG INJ SC/IM: CPT

## 2025-01-02 PROCEDURE — 85610 PROTHROMBIN TIME: CPT

## 2025-01-02 PROCEDURE — 97535 SELF CARE MNGMENT TRAINING: CPT

## 2025-01-02 PROCEDURE — 97110 THERAPEUTIC EXERCISES: CPT

## 2025-01-02 PROCEDURE — 36415 COLL VENOUS BLD VENIPUNCTURE: CPT

## 2025-01-02 PROCEDURE — 6370000000 HC RX 637 (ALT 250 FOR IP): Performed by: ORTHOPAEDIC SURGERY

## 2025-01-02 PROCEDURE — G0378 HOSPITAL OBSERVATION PER HR: HCPCS

## 2025-01-02 PROCEDURE — 94761 N-INVAS EAR/PLS OXIMETRY MLT: CPT

## 2025-01-02 PROCEDURE — 97116 GAIT TRAINING THERAPY: CPT

## 2025-01-02 PROCEDURE — 80202 ASSAY OF VANCOMYCIN: CPT

## 2025-01-02 PROCEDURE — 80053 COMPREHEN METABOLIC PANEL: CPT

## 2025-01-02 PROCEDURE — 6360000002 HC RX W HCPCS: Performed by: STUDENT IN AN ORGANIZED HEALTH CARE EDUCATION/TRAINING PROGRAM

## 2025-01-02 RX ORDER — SULFAMETHOXAZOLE AND TRIMETHOPRIM 800; 160 MG/1; MG/1
1 TABLET ORAL 2 TIMES DAILY
Qty: 14 TABLET | Refills: 0 | Status: SHIPPED | OUTPATIENT
Start: 2025-01-02 | End: 2025-01-09

## 2025-01-02 RX ORDER — WARFARIN SODIUM 7.5 MG/1
7.5 TABLET ORAL
Status: DISCONTINUED | OUTPATIENT
Start: 2025-01-02 | End: 2025-01-02 | Stop reason: HOSPADM

## 2025-01-02 RX ORDER — WARFARIN SODIUM 5 MG/1
10 TABLET ORAL
Status: DISCONTINUED | OUTPATIENT
Start: 2025-01-02 | End: 2025-01-02

## 2025-01-02 RX ADMIN — EZETIMIBE 10 MG: 10 TABLET ORAL at 08:16

## 2025-01-02 RX ADMIN — DOCUSATE SODIUM 50 MG AND SENNOSIDES 8.6 MG 1 TABLET: 8.6; 5 TABLET, FILM COATED ORAL at 08:15

## 2025-01-02 RX ADMIN — HYDROCODONE BITARTRATE AND ACETAMINOPHEN 1 TABLET: 5; 325 TABLET ORAL at 13:06

## 2025-01-02 RX ADMIN — ROSUVASTATIN CALCIUM 10 MG: 10 TABLET, FILM COATED ORAL at 09:15

## 2025-01-02 RX ADMIN — ACETAMINOPHEN 650 MG: 325 TABLET ORAL at 00:43

## 2025-01-02 RX ADMIN — VANCOMYCIN 1250 MG: 1.75 INJECTION, SOLUTION INTRAVENOUS at 08:20

## 2025-01-02 RX ADMIN — ENOXAPARIN SODIUM 90 MG: 100 INJECTION SUBCUTANEOUS at 09:15

## 2025-01-02 RX ADMIN — Medication 1000 UNITS: at 08:15

## 2025-01-02 RX ADMIN — SODIUM CHLORIDE, PRESERVATIVE FREE 10 ML: 5 INJECTION INTRAVENOUS at 08:16

## 2025-01-02 RX ADMIN — DULOXETINE HYDROCHLORIDE 60 MG: 60 CAPSULE, DELAYED RELEASE ORAL at 08:16

## 2025-01-02 RX ADMIN — HYDROCODONE BITARTRATE AND ACETAMINOPHEN 1 TABLET: 5; 325 TABLET ORAL at 05:04

## 2025-01-02 ASSESSMENT — PAIN DESCRIPTION - LOCATION
LOCATION: KNEE

## 2025-01-02 ASSESSMENT — PAIN DESCRIPTION - PAIN TYPE
TYPE: SURGICAL PAIN

## 2025-01-02 ASSESSMENT — PAIN - FUNCTIONAL ASSESSMENT
PAIN_FUNCTIONAL_ASSESSMENT: ACTIVITIES ARE NOT PREVENTED

## 2025-01-02 ASSESSMENT — PAIN DESCRIPTION - DESCRIPTORS
DESCRIPTORS: ACHING
DESCRIPTORS: THROBBING

## 2025-01-02 ASSESSMENT — PAIN DESCRIPTION - ORIENTATION
ORIENTATION: LEFT

## 2025-01-02 ASSESSMENT — PAIN DESCRIPTION - FREQUENCY
FREQUENCY: CONTINUOUS

## 2025-01-02 ASSESSMENT — PAIN DESCRIPTION - ONSET
ONSET: ON-GOING

## 2025-01-02 ASSESSMENT — PAIN SCALES - GENERAL
PAINLEVEL_OUTOF10: 6
PAINLEVEL_OUTOF10: 2
PAINLEVEL_OUTOF10: 1
PAINLEVEL_OUTOF10: 3
PAINLEVEL_OUTOF10: 5

## 2025-01-02 NOTE — DISCHARGE INSTR - PHARMACY
Warfarin 7.5 mg PO today (Thursday) and then 3.75 mg daily until follow up appointment with the coumadin clinic on 1/7/25 @ 10:20 am.

## 2025-01-02 NOTE — PROGRESS NOTES
Department of Orthopedic Surgery  Progress Note    Subjective:  No complaints.  Overall, doing well postoperatively. Having more pain today than yesterday.  Pain is 8 out of 10 at worst and with movement/PT.  Better at rest.  Currently pain is 0 out of 10 at rest.  Has some bleeding noted at distal pole of incision but not saturated. Denies N/T.  Denies calf pain.     Vitals  VITALS:  /73   Pulse 88   Temp 97.6 °F (36.4 °C) (Temporal)   Resp 16   Ht 1.6 m (5' 3\")   Wt 94.8 kg (208 lb 15.9 oz)   SpO2 95%   BMI 37.02 kg/m²     PHYSICAL EXAM:  General: in no apparent distress, well developed and well nourished, alert, and oriented times 3  Left Lower Extremity  Incision:  Aquacel dressing in place and intact.  There is bleeding noted at distal pole of incision but dressing is no saturated or boggy.  Neurologic:  Moving lower extremity as appropriate following sugery.  Able to dorsiflex and plantar flex foot/ankle.  Intact to gross sensation and touch in lower extremity.  Vascular: present 2+ lower extremity.  Calf soft, non-tender. No evidence of DVT seen on physical exam.  Negative Renaldo's sign.  No cords or calf tenderness.  Abnormal Exam findings:  none    LABS:  Hgb:    Lab Results   Component Value Date/Time    HGB 11.4 01/01/2025 05:30 AM     CMP:    Lab Results   Component Value Date/Time     01/02/2025 05:30 AM    K 4.3 01/02/2025 05:30 AM     01/02/2025 05:30 AM    CO2 25 01/02/2025 05:30 AM    BUN 18 01/02/2025 05:30 AM    CREATININE 0.9 01/02/2025 05:30 AM    GFRAA >60 09/30/2022 12:25 PM    LABGLOM 65 01/02/2025 05:30 AM    LABGLOM >60 01/02/2024 08:07 AM    GLUCOSE 125 01/02/2025 05:30 AM    GLUCOSE 110 05/22/2012 07:45 AM    CALCIUM 8.2 01/02/2025 05:30 AM    BILITOT 0.3 01/02/2025 05:30 AM    ALKPHOS 65 01/02/2025 05:30 AM    AST 17 01/02/2025 05:30 AM    ALT 12 01/02/2025 05:30 AM       ASSESSMENT AND PLAN:  Post operative day 2 status post left total knee arthroplasty.    1:   Weight bearing as tolerated  2:  Continue Deep venous thrombosis prophylaxis.  Will resume Coumadin-lovenox bridge until has therapeutic INR   3:  Continue physical therapy.  Has outpatient PT scheduled tomorrow  4:  D/C Plan:  Home  5:  Will d/c on Bactrim DS b.I.d. for 7 days due to hx of post-op MRSA infection  6:  Continue Pain Control.  Norco 5-325 mg every 4-6 hours prn.  Can also use ice and Tylenol   7:  Keep 2 week post-op appt

## 2025-01-02 NOTE — PROGRESS NOTES
Physical Therapy  Facility/Department: [facility] MED SURG  Daily Treatment Note  NAME: Rose L Kahler  : 1940  MRN: [id number]    Date of Service: 2025    Discharge Recommendations:  Continue to assess pending progress, Outpatient PT     Patient Diagnosis(es):L TKA  Assessment  Assessment: supine isometrics x 10--QS, GS and ankle pumps. seated B LE therex x 10--LAQ, hip abd, marches.Transfers:CGA. Gait 80ftx1 with WW< CGA for safety with slow cadenc rasta decreased R step length d/t L LE pain. Commode use and transfer.  Activity Tolerance: Patient tolerated treatment well    Plan  Physical Therapy Plan  General Plan: 2 times a day 7 days a week  Specific Instructions for Next Treatment: once per day on weekends and holidays  Current Treatment Recommendations: Strengthening;ROM;Balance training;Functional mobility training;Transfer training;Endurance training;Gait training;Stair training;Neuromuscular re-education;Pain management;Home exercise program;Therapeutic activities    Restrictions  Restrictions/Precautions  Restrictions/Precautions: Weight Bearing, Fall Risk, General Precautions  Lower Extremity Weight Bearing Restrictions  Left Lower Extremity Weight Bearing: Weight Bearing As Tolerated     Subjective   Orientation  Overall Orientation Status: Within Functional Limits    Objective  Bed Mobility Training  Bed Mobility Training: No  Transfer Training  Transfer Training: Yes  Overall Level of Assistance: Contact-guard assistance;Assist X1  Interventions: Demonstration;Verbal cues  Sit to Stand: Contact-guard assistance;Assist X1  Stand to Sit: Contact-guard assistance;Assist X1  Toilet Transfer: Contact-guard assistance  Gait  Gait Training: Yes  Overall Level of Assistance: Assist X1;Contact-guard assistance  Distance (ft): 80 Feet  Assistive Device: Gait belt;Walker, rolling  Interventions: Demonstration;Verbal cues;Visual cues  Base of Support: Shift to right  Speed/Ana: Delayed  Step Length:  Right shortened  Gait Abnormalities: Antalgic;Decreased step clearance  PT Exercises  Exercise Treatment: supine isometrics x 10--QS, GS and ankle pumps. seated B LE therex x 10--LAQ, hip abd, marches.  Other Specialty Interventions  Other Treatments/Modalities: commode use and transfer  Safety Devices  Type of Devices: All fall risk precautions in place;Call light within reach;Chair alarm in place;Gait belt;Left in chair;Patient at risk for falls      Goals  Short Term Goals  Time Frame for Short Term Goals: 20 days  Short Term Goal 1: Pt to tolerate 20 minutes of ther ex to facilitate return to PLOF.  Short Term Goal 2: Pt to ambulate 100ft with FWW SBA to increase ambulatory capacity for d/c  Short Term Goal 3: Pt to complete transfers with FWW and SUP to demonstrate increased independacne for safety with d/c.  Patient Goals   Patient Goals : Pt goal to return home    Education  Patient Education  Education Given To: Patient  Education Provided: Home Exercise Program;Transfer Training;Fall Prevention Strategies;Role of Therapy  Education Method: Demonstration;Teach Back  Barriers to Learning: None  Education Outcome: Verbalized understanding;Demonstrated understanding      Therapy Time   Individual Concurrent Group Co-treatment   Time In 0728         Time Out 0757         Minutes 29             Anu Vazquez, DARIO

## 2025-01-02 NOTE — PROGRESS NOTES
ADDEND:  Patient will be discharging on Bactrim DS.  Will decrease warfarin dose to 7.5mg (patient's home dose) due to the significant interaction potential between warfarin and Bactrim DS as well as the potential interaction with vancomycin.    Thank you,  Marleny Oro, KAYLEE.Ph., 1/2/2025,10:02 AM

## 2025-01-02 NOTE — PROGRESS NOTES
[facility]  Pharmacy Department    Clinical Pharmacy Note-Warfarin Follow-up       Patient:  Rose L Kahler  MRN: [id number]    Warfarin consult follow-up:    INR (no units)   Date Value   01/02/2025 1.3   01/01/2025 1.1   12/31/2024 1.0   07/02/2024 2.3   05/07/2024 2.4   03/26/2024 2.5   02/13/2024 3     INR,(POC) (no units)   Date Value   12/24/2024 2.4   11/21/2024 1.9   10/16/2024 2.2   09/16/2024 2.7   08/14/2024 3.2       Hemoglobin (g/dL)   Date Value   01/01/2025 11.4 (L)   12/02/2024 14.1   10/16/2024 14.6     Hematocrit (%)   Date Value   01/01/2025 35.5 (L)   12/02/2024 43.2   10/16/2024 46.5     Platelets (k/uL)   Date Value   12/02/2024 271   10/16/2024 238   05/24/2024 252       Target INR Range: 2.0-3.0    Significant Drug-Drug Interactions:  New warfarin drug-drug interactions: continues enoxaparin post op bridge  Discontinued drug-drug interactions: no change      Notes:        patient to take warfarin 10mg po today. Enoxaparin to continue until INR therapeutic. Pharmacy will continue to monitor closely.             Daily PT/INR until stable within therapeutic range.     Thank you,  Marleny Oro RP,   1/2/2025, 7:10 AM

## 2025-01-02 NOTE — PROGRESS NOTES
Occupational Therapy  Facility/Department: [facility] MED SURG  Daily Treatment Note  NAME: Rose L Kahler  : 1940  MRN: [id number]    Date of Service: 2025    Discharge Recommendations:  Continue to assess pending progress         Patient Diagnosis(es): L TKA    Assessment   Activity Tolerance: Patient tolerated treatment well  Discharge Recommendations: Continue to assess pending progress     Plan  Occupational Therapy Plan  Times Per Day: Once a day  Days Per Week: 7 Days  Current Treatment Recommendations: Strengthening;Balance training;Functional mobility training;Endurance training;Safety education & training;Patient/Caregiver education & training;Equipment evaluation, education, & procurement;Self-Care / ADL    Restrictions   WBAT LLE, fall risk    Subjective  Subjective  Subjective: Pt in recliner, requesting to move to toilet for a BM. Pt unable to move bowels at this time. \"I feel nauseous.\"  Pain: Mod pain at surgical site.  Orientation  Overall Orientation Status: Within Functional Limits          Objective  Vitals     ADL  Grooming: Setup;Stand by assistance  Grooming Skilled Clinical Factors: seated  UE Bathing: Setup;Stand by assistance  UE Bathing Skilled Clinical Factors: seated  LE Bathing: Moderate assistance  LE Bathing Skilled Clinical Factors: seated/standing  UE Dressing: Setup;Stand by assistance  LE Dressing: Maximum assistance  Putting On/Taking Off Footwear: Maximum assistance  Toileting: Moderate assistance  Toileting Skilled Clinical Factors: CGA BSC transfers; patient MOD A for hygiene and clothing management.  Functional Mobility: Contact guard assistance;Increased time to complete  Functional Mobility Skilled Clinical Factors: FWW  Additional Comments: CGA ADL transfers, increased time  Product Used : Soap and water        Safety Devices  Type of Devices: All fall risk precautions in place;Call light within reach;Chair alarm in place;Left in chair    Patient

## 2025-01-02 NOTE — PROGRESS NOTES
Reviewed discharge instructions with patient and her son Dash.  Patient aware of need to  Bactrim DS from [employer].  Reviewed new medication and side effects to monitor for.  Reviewed home medications and when next dose is due including new instructions for taking her Warfarin till her next Coumadin Clinic appointment.  Reviewed date/time of follow up appointment.   Instructed to ambulate with use of walker, may shower in 24 hours with use of CHG soap.  Bottle of CHG soap sent home with patient.  Patient is to continue to wear thigh high arnol hose and use incentive spirometer frequently.  Reviewed signs/symptoms of infection to monitor for and provided Dr. Artis's office number and cell phone number in case of need.  Educational handout given on Preventing Surgical Site infection.   Questions answered.   Verbalizes understanding.  Copy of discharge instructions given to patient.

## 2025-01-02 NOTE — PROGRESS NOTES
Entered room and patient is resting in the chair. Vitals and assessment completed at this time, see flowsheet for details. A&O x4. Patient rated pain 1/10 and just wanted ice applied. 2+ pedal pulses. Small amount of drainage noted on dressing, it is still intact. Patient denies any further needs at this time. Call light in reach and chair alarm on.

## 2025-01-02 NOTE — DISCHARGE INSTR - DIET

## 2025-01-02 NOTE — PROGRESS NOTES
Writer to bedside to complete morning assessment. Upon entry to room, pt laying in chair, respirations even and unlabored while on room air. Vitals obtained and assessment completed, see flow sheet for details. Pt denies needs from writer at this time. Call light in reach. Chair alarm active. Care ongoing.

## 2025-01-02 NOTE — PROGRESS NOTES
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  Recent Labs     12/31/24  1239 01/01/25  0530 01/02/25  0530   CREATININE 0.9 1.0* 0.9   BUN  --  22 18     No results found for: \"PROCAL\"    Intake/Output Summary (Last 24 hours) at 1/2/2025 0722  Last data filed at 1/2/2025 0445  Gross per 24 hour   Intake 1070 ml   Output 1775 ml   Net -705 ml         Recent vancomycin administrations                     vancomycin (VANCOCIN) 1250 mg in 250 mL IVPB (mg) 1,250 mg New Bag 01/01/25 0927    vancomycin (VANCOCIN) 1250 mg in 250 mL IVPB (mg) 1,250 mg New Bag 12/31/24 0755                  MRSA Nasal Swab: N/A. Non-respiratory infection..    PLAN   Creatinine improved over last 24 hours  Random vancomycin level 1/2/25 at 0530 resulted 6.4mg/L  Continue vancomycin 1250mg IV every 24 hours. Anticipated  and trough at steady state 13.1mg/L using Bayesian method dosing.   Labs per protocol  Pharmacy will continue to monitor closely for renal function and clinical response.      Vancomycin Target Concentration Parameters  Treatment  Population Target AUC/JOSEF Target Trough   Invasive MRSA Infection (bacteremia, pneumonia, meningitis, endocarditis, osteomyelitis)  Sepsis (undifferentiated) 400-600 N/A   Infection due to non-MRSA pathogen  Empiric treatment of non-invasive MRSA infection  (SSTI, UTI) <500 10-15 mg/L   CrCl < 29 mL/min  Rapidly fluctuating serum creatinine   LETICIA N/A < 15 mg/L     Renal replacement therapy is dosed by levels, per hospital protocol.  Abbreviations  * Pauc: probability that AUC is >400 (efficacy); Pconc: probability that Ctrough is above 20 ?g/mL

## 2025-01-02 NOTE — DISCHARGE SUMMARY
ADMISSION DIAGNOSIS:  Left knee primary osteoarthritis.     PROCEDURES WHILE INPATIENT:  Left total knee arthroplasty performed on 12/31/2024.     HOSPITAL COURSE:  The patient presented to the hospital on day of surgery. Surgery was completed without complications.  Following surgery, the patient was admitted to regular nursing floor for postoperative pain control and physical therapy.  The patient's pain was controlled with oral and IV medications. Patient had pharmacologic and mechanical DVT prophylaxis. The patient began working with physical therapy and occupational therapy. Case management was consulted for assistance with discharge planning.  The patient was deemed safe for discharge and was subsequently discharged following an uncomplicated postoperative course.    DISPOSITION:  Home     CONDITION UPON DISCHARGE:  Stable.     DISCHARGE MEDICATIONS:  The patient will resume home pre-hospital medication regimen.   New medications include:   Norco 5/325 mg 1 tablets every 4 hours as needed for pain  Bactrim -160 1 tablet twice a day for 7 days  Will resume Warfarin 7.5 mg daily  Lovenox       INSTRUCTIONS:  The patient may bear weight as tolerated on the operative extremity. Dressing may be changed as needed.  Keep incision dry.     FOLLOW UP:  Follow up with Dr. Artis in 2 weeks.   Follow up with outpatient physical therapy in 2-3 days as scheduled.    Follow up with coumadin clinic and INR recheck  Follow up with primary care physician within 1 month, or sooner as needed.

## 2025-01-02 NOTE — CONSULTS
Herminia Nciole, APRN-CNP -- Hospitalist  Progress Note 1/2/25    SUBJECTIVE:    Patient seen for f/u of post op medical management of Factor V Leiden, History of DVT, Long term anticoagulation.   She is POD # 1 s/p left TKR.  Her pain is fairly well controlled.  She denies nausea and vomiting.  Last BM was prior to surgery.  She denies any chest pain, palpitations or shortness of breath.  She is doing well with PT    ROS:   Constitutional: negative  for fevers, and negative for chills.  Respiratory: negative for shortness of breath, negative for cough, and negative for wheezing  Cardiovascular: negative for chest pain, and negative for palpitations  Gastrointestinal: negative for abdominal pain, negative for nausea,negative for vomiting, negative for diarrhea, and negative for constipation    OBJECTIVE:    Vitals:   Temp: 97.6 °F (36.4 °C)  BP: 126/73  Respirations: 16  Pulse: 88  SpO2: 95 %    24HR INTAKE/OUTPUT:    Intake/Output Summary (Last 24 hours) at 1/2/2025 0756  Last data filed at 1/2/2025 0445  Gross per 24 hour   Intake 1070 ml   Output 1775 ml   Net -705 ml      -----------------------------------------------------------------    Exam:  GEN:    Awake, alert and oriented x3.   EYES:  EOMI, pupils equal   NECK: Supple. No lymphadenopathy.  No carotid bruit  CVS:    regular rate and rhythm, no audible murmur  PULM:  CTA, no wheezes, rales or rhonchi, no acute respiratory distress  ABD:    Bowels sounds normal.  Abdomen is soft.  No distention.  no tenderness to palpation.   EXT:   no edema bilaterally .  No calf tenderness.   NEURO: Moves all extremities.  Motor and sensory are grossly intact   SKIN:  Incision is dressed with minimal drainage.     Diagnostic Data:  Lab Results   Component Value Date    HGB 11.4 (L) 01/01/2025      Lab Results   Component Value Date    GLUCOSE 125 (H) 01/02/2025    BUN 18 01/02/2025    CREATININE 0.9 01/02/2025     01/02/2025    K 4.3 01/02/2025    CALCIUM 8.2 (L)  01/02/2025     01/02/2025    CO2 25 01/02/2025       PROBLEM LIST:  Principal Problem:    Primary osteoarthritis of left knee  Active Problems:    Long term current use of anticoagulant therapy    Factor V Leiden (HCC)    Hyperlipidemia    History of DVT (deep vein thrombosis)    Major depressive disorder, recurrent, mild  Resolved Problems:    * No resolved hospital problems. *      ASSESSMENT / PLAN:    Post op medical management for Long term anticoagulation, Factor 5 Leiden, Hx of DVT  Post op day # 1 s/p left TKR  Continue current physical and occupational therapy  Continue DVT prophylaxis  IVL  Continue Coumadin Bridge  Continue IV Vanco  Continue Norco  Stop MS    Acute post op blood loss anemia  Monitor H/H-11.4    History of DVT / Factor 5 Leiden / Long-Term Anticoagulation  Treatment:  Monitor Labs  Medications:  Pharm-Bridge Coumadin    Discharge plan is home with outpatient PT    Herminia Ruiz, MEDINA - CNP , APRN-CNP  1/2/2025  7:57 AM

## 2025-01-02 NOTE — PLAN OF CARE
Problem: Discharge Planning  Goal: Discharge to home or other facility with appropriate resources  1/2/2025 0923 by Shaylee Slater RN  Outcome: Progressing  Flowsheets (Taken 1/2/2025 0923)  Discharge to home or other facility with appropriate resources:   Identify barriers to discharge with patient and caregiver   Arrange for needed discharge resources and transportation as appropriate   Identify discharge learning needs (meds, wound care, etc)     Problem: Pain  Goal: Verbalizes/displays adequate comfort level or baseline comfort level  1/2/2025 0923 by Shaylee Slater RN  Outcome: Progressing  Flowsheets (Taken 1/2/2025 0923)  Verbalizes/displays adequate comfort level or baseline comfort level:   Assess pain using appropriate pain scale   Encourage patient to monitor pain and request assistance   Administer analgesics based on type and severity of pain and evaluate response   Implement non-pharmacological measures as appropriate and evaluate response     Problem: Safety - Adult  Goal: Free from fall injury  1/2/2025 0923 by Shaylee Slater RN  Outcome: Progressing  Flowsheets (Taken 1/2/2025 0923)  Free From Fall Injury: Instruct family/caregiver on patient safety     Problem: ABCDS Injury Assessment  Goal: Absence of physical injury  1/2/2025 0923 by Shaylee Slater RN  Outcome: Progressing  Flowsheets (Taken 1/2/2025 0923)  Absence of Physical Injury: Implement safety measures based on patient assessment     Problem: Musculoskeletal - Adult  Goal: Return mobility to safest level of function  1/2/2025 0923 by Shaylee Slater RN  Outcome: Progressing  Flowsheets (Taken 1/2/2025 0923)  Return Mobility to Safest Level of Function:   Assess patient stability and activity tolerance for standing, transferring and ambulating with or without assistive devices   Assist with transfers and ambulation using safe patient handling equipment as needed   Ensure adequate protection for wounds/incisions during mobilization

## 2025-01-02 NOTE — PLAN OF CARE
Problem: Discharge Planning  Goal: Discharge to home or other facility with appropriate resources  Outcome: Progressing  Flowsheets (Taken 1/2/2025 0205)  Discharge to home or other facility with appropriate resources: Identify barriers to discharge with patient and caregiver     Problem: Pain  Goal: Verbalizes/displays adequate comfort level or baseline comfort level  Outcome: Progressing  Flowsheets (Taken 1/2/2025 0205)  Verbalizes/displays adequate comfort level or baseline comfort level:   Encourage patient to monitor pain and request assistance   Assess pain using appropriate pain scale   Administer analgesics based on type and severity of pain and evaluate response   Implement non-pharmacological measures as appropriate and evaluate response     Problem: Safety - Adult  Goal: Free from fall injury  Outcome: Progressing  Flowsheets (Taken 1/2/2025 0205)  Free From Fall Injury: Instruct family/caregiver on patient safety     Problem: ABCDS Injury Assessment  Goal: Absence of physical injury  Outcome: Progressing  Flowsheets (Taken 1/2/2025 0205)  Absence of Physical Injury: Implement safety measures based on patient assessment     Problem: Musculoskeletal - Adult  Goal: Return mobility to safest level of function  Outcome: Progressing  Flowsheets (Taken 1/2/2025 0205)  Return Mobility to Safest Level of Function:   Assess patient stability and activity tolerance for standing, transferring and ambulating with or without assistive devices   Assist with transfers and ambulation using safe patient handling equipment as needed   Ensure adequate protection for wounds/incisions during mobilization     Problem: Musculoskeletal - Adult  Goal: Maintain proper alignment of affected body part  Outcome: Progressing

## 2025-01-07 ENCOUNTER — ANTI-COAG VISIT (OUTPATIENT)
Age: 85
End: 2025-01-07
Payer: COMMERCIAL

## 2025-01-07 VITALS — HEART RATE: 96 BPM | DIASTOLIC BLOOD PRESSURE: 84 MMHG | SYSTOLIC BLOOD PRESSURE: 160 MMHG

## 2025-01-07 DIAGNOSIS — Z79.01 LONG TERM CURRENT USE OF ANTICOAGULANT THERAPY: Primary | Chronic | ICD-10-CM

## 2025-01-07 LAB
INTERNATIONAL NORMALIZATION RATIO, POC: 2
PROTHROMBIN TIME, POC: 0

## 2025-01-07 PROCEDURE — 99212 OFFICE O/P EST SF 10 MIN: CPT

## 2025-01-07 PROCEDURE — 85610 PROTHROMBIN TIME: CPT

## 2025-01-07 NOTE — PROGRESS NOTES
[facility]  Medication Management  ANTICOAGULATION    Referring Provider: Dr Streeter     GOAL INR: 2 - 3     TODAY'S INR: 2.0     WARFARIN Dosage: Take warfarin half tablet for 3.75 mg today and tomorrow then continue warfarin half tablet for 3.75 mg Monday, Friday and 7.5 mg all other days.     INR (no units)   Date Value   2025 1.3   2025 1.1   2024 1.0   2024 2.3   2024 2.4   2024 2.5   2024 3     INR,(POC) (no units)   Date Value   2024 2.4   2024 1.9   10/16/2024 2.2   2024 2.7   2024 3.2       Hemoglobin   Date Value Ref Range Status   2025 11.4 (L) 11.9 - 15.1 g/dL Final     Hematocrit   Date Value Ref Range Status   2025 35.5 (L) 36.3 - 47.1 % Final     ALT   Date Value Ref Range Status   2025 12 10 - 35 U/L Final     AST   Date Value Ref Range Status   2025 17 10 - 35 U/L Final       Medication changes:  Norco for post op pain  Stop Lovenox  Bactrim to prevent infection in knee    Notes:    Fingerstick INR drawn per clinic protocol. Patient states no visible blood in urine and no black tarry stool. Denies any missed doses of warfarin. No change in other maintenance medications or in diet. Will recheck INR in 1.5 weeks. Patient acknowledges working in consult agreement with pharmacist as referred by his/her physician.    Held warfarin 5 days prior to total knee bridge on 24 with Lovenox as directed on calendar.  Patient refused weight.  Patient had MRSA after last knee surgery and is completing a course of Bactrim now.          For Pharmacy Admin Tracking Only    Intervention Detail: Adherence Monitorin, Dose Adjustment: 2, reason: Therapy Optimization, and New Rx: 3, reason: Needs Additional Therapy  Total # of Interventions Recommended: 6  Total # of Interventions Accepted: 6  Time Spent (min): 20      Danyell Harris RPH, PharmD

## 2025-01-13 ENCOUNTER — ANTI-COAG VISIT (OUTPATIENT)
Age: 85
End: 2025-01-13
Payer: COMMERCIAL

## 2025-01-13 VITALS
DIASTOLIC BLOOD PRESSURE: 81 MMHG | BODY MASS INDEX: 35.96 KG/M2 | SYSTOLIC BLOOD PRESSURE: 167 MMHG | WEIGHT: 203 LBS | HEART RATE: 103 BPM

## 2025-01-13 DIAGNOSIS — Z79.01 LONG TERM CURRENT USE OF ANTICOAGULANT THERAPY: Primary | Chronic | ICD-10-CM

## 2025-01-13 LAB
INTERNATIONAL NORMALIZATION RATIO, POC: 1.1
PROTHROMBIN TIME, POC: 0

## 2025-01-13 PROCEDURE — 99212 OFFICE O/P EST SF 10 MIN: CPT | Performed by: COUNSELOR

## 2025-01-13 PROCEDURE — 85610 PROTHROMBIN TIME: CPT | Performed by: COUNSELOR

## 2025-01-13 NOTE — PROGRESS NOTES
Optimization  Total # of Interventions Recommended: 3  Total # of Interventions Accepted: 3  Time Spent (min): 30       Allison Deng [facility]

## 2025-01-16 ENCOUNTER — ANTI-COAG VISIT (OUTPATIENT)
Age: 85
End: 2025-01-16
Payer: COMMERCIAL

## 2025-01-16 DIAGNOSIS — Z79.01 LONG TERM CURRENT USE OF ANTICOAGULANT THERAPY: Primary | Chronic | ICD-10-CM

## 2025-01-16 LAB
INTERNATIONAL NORMALIZATION RATIO, POC: 1.6
PROTHROMBIN TIME, POC: 0

## 2025-01-16 PROCEDURE — 85610 PROTHROMBIN TIME: CPT

## 2025-01-16 PROCEDURE — 99212 OFFICE O/P EST SF 10 MIN: CPT

## 2025-01-16 NOTE — PROGRESS NOTES
[facility]/[place]  Medication Management  ANTICOAGULATION    Referring Provider: Dr. Streeter     GOAL INR: 2 - 3     TODAY'S INR: 1.6     WARFARIN Dosage: Take warfarin 1.5 tablets for 11.25 mg today then 7.5 mg tablet tomorrow (1/17) then resume half tablet for 3.75 mg MF and 7.5 mg tablet all other days.   Lovenox 100 mg SUBQ every 12 hours until INR therapeutic    INR (no units)   Date Value   01/02/2025 1.3   01/01/2025 1.1   12/31/2024 1.0   07/02/2024 2.3   05/07/2024 2.4   03/26/2024 2.5   02/13/2024 3     INR,(POC) (no units)   Date Value   01/13/2025 1.1   01/07/2025 2.0   12/24/2024 2.4   11/21/2024 1.9   10/16/2024 2.2   09/16/2024 2.7   08/14/2024 3.2       Hemoglobin   Date Value Ref Range Status   01/01/2025 11.4 (L) 11.9 - 15.1 g/dL Final     Hematocrit   Date Value Ref Range Status   01/01/2025 35.5 (L) 36.3 - 47.1 % Final     ALT   Date Value Ref Range Status   01/02/2025 12 10 - 35 U/L Final     AST   Date Value Ref Range Status   01/02/2025 17 10 - 35 U/L Final       Medication changes:  Continue Lovevox    Notes:    Fingerstick INR drawn per clinic protocol. Patient states no visible blood in urine and no black tarry stool. Denies any missed doses of warfarin. No change in other maintenance medications or in diet. Will recheck INR in 1 week. Patient acknowledges working in consult agreement with pharmacist as referred by his/her physician.     Patient refuses weight and BP.    Patient held warfarin 5 days prior to total knee on 12/31/24 and bridged with Lovenox as directed on calendar.  Patient had MRSA after last knee surgery and completed a 7 day course of  Bactrim on 1/10/24.   Patient states she missed her Lovenox last evening.    Patient states she has some Lovenox injections left at home.  Patient will resume Lovenox 100 mg SUBQ BID for the next 3 days due to today's sub-therapeutic INR.              For Pharmacy Admin Tracking Only    Intervention Detail: Adherence

## 2025-01-20 ENCOUNTER — APPOINTMENT (OUTPATIENT)
Age: 85
End: 2025-01-20
Payer: COMMERCIAL

## 2025-01-23 ENCOUNTER — ANTI-COAG VISIT (OUTPATIENT)
Age: 85
End: 2025-01-23
Payer: COMMERCIAL

## 2025-01-23 VITALS — HEART RATE: 86 BPM | SYSTOLIC BLOOD PRESSURE: 134 MMHG | DIASTOLIC BLOOD PRESSURE: 75 MMHG

## 2025-01-23 DIAGNOSIS — Z79.01 LONG TERM CURRENT USE OF ANTICOAGULANT THERAPY: Primary | Chronic | ICD-10-CM

## 2025-01-23 LAB
INTERNATIONAL NORMALIZATION RATIO, POC: 2.8
PROTHROMBIN TIME, POC: 0

## 2025-01-23 PROCEDURE — 85610 PROTHROMBIN TIME: CPT

## 2025-01-23 PROCEDURE — 99211 OFF/OP EST MAY X REQ PHY/QHP: CPT

## 2025-01-23 NOTE — PROGRESS NOTES
[facility]  Medication Management  ANTICOAGULATION    Referring Provider: Dr. Streeter     GOAL INR: 2 - 3     TODAY'S INR: 2.8     WARFARIN Dosage: Continue warfarin half tablet for 3.75 mg MF and 7.5 mg tablet all other days.     INR (no units)   Date Value   2025 1.3   2025 1.1   2024 1.0   2024 2.3   2024 2.4   2024 2.5   2024 3     INR,(POC) (no units)   Date Value   2025 1.6   2025 1.1   2025 2.0   2024 2.4   2024 1.9   10/16/2024 2.2   2024 2.7       Hemoglobin   Date Value Ref Range Status   2025 11.4 (L) 11.9 - 15.1 g/dL Final     Hematocrit   Date Value Ref Range Status   2025 35.5 (L) 36.3 - 47.1 % Final     ALT   Date Value Ref Range Status   2025 12 10 - 35 U/L Final     AST   Date Value Ref Range Status   2025 17 10 - 35 U/L Final       Medication changes:  Stop Lovenox    Notes:    Fingerstick INR drawn per clinic protocol. Patient states no visible blood in urine and no black tarry stool. Denies any missed doses of warfarin. No change in other maintenance medications or in diet. Will recheck INR in 2 weeks. Patient acknowledges working in consult agreement with pharmacist as referred by his/her physician.    Patient refuses weight today.  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              For Pharmacy Admin Tracking Only    Intervention Detail: Adherence Monitorin and New Rx: 1, reason: Needs Additional Therapy  Total # of Interventions Recommended: 2  Total # of Interventions Accepted: 2  Time Spent (min): 20      Danyell Harris [facility], PharmD

## 2025-02-05 DIAGNOSIS — Z79.01 LONG TERM CURRENT USE OF ANTICOAGULANT THERAPY: Primary | ICD-10-CM

## 2025-02-06 ENCOUNTER — APPOINTMENT (OUTPATIENT)
Age: 85
End: 2025-02-06
Payer: COMMERCIAL

## 2025-02-07 ENCOUNTER — ANTI-COAG VISIT (OUTPATIENT)
Age: 85
End: 2025-02-07
Payer: COMMERCIAL

## 2025-02-07 VITALS
HEART RATE: 96 BPM | SYSTOLIC BLOOD PRESSURE: 147 MMHG | DIASTOLIC BLOOD PRESSURE: 89 MMHG | WEIGHT: 204.2 LBS | BODY MASS INDEX: 36.17 KG/M2

## 2025-02-07 DIAGNOSIS — Z79.01 LONG TERM CURRENT USE OF ANTICOAGULANT THERAPY: Primary | Chronic | ICD-10-CM

## 2025-02-07 LAB
INTERNATIONAL NORMALIZATION RATIO, POC: 5.2
PROTHROMBIN TIME, POC: 0

## 2025-02-07 PROCEDURE — 85610 PROTHROMBIN TIME: CPT | Performed by: FAMILY MEDICINE

## 2025-02-07 PROCEDURE — 99212 OFFICE O/P EST SF 10 MIN: CPT | Performed by: FAMILY MEDICINE

## 2025-02-07 NOTE — PROGRESS NOTES
[facility]  Medication Management  ANTICOAGULATION    Referring Provider: Dr Streeter    GOAL INR: 2.0-3.0    TODAY'S INR: 5.2    WARFARIN Dosage: hold x2 then 3.75mg MF, 7.5mg all other days (adjust Wednesday to 3.75mg x1)    INR (no units)   Date Value   2025 1.3   2025 1.1   2024 1.0   2024 2.3   2024 2.4   2024 2.5   2024 3     INR,(POC) (no units)   Date Value   2025 5.2   2025 2.8   2025 1.6   2025 1.1   2025 2.0   2024 2.4   2024 1.9       Hemoglobin   Date Value Ref Range Status   2025 11.4 (L) 11.9 - 15.1 g/dL Final     Hematocrit   Date Value Ref Range Status   2025 35.5 (L) 36.3 - 47.1 % Final     ALT   Date Value Ref Range Status   2025 12 10 - 35 U/L Final     AST   Date Value Ref Range Status   2025 17 10 - 35 U/L Final       Medication changes:  Trazodone 50mg HS started     Notes:    Fingerstick INR drawn per clinic protocol. Patient states no visible blood in urine and no black tarry stool. Denies any missed doses of warfarin. No change in other maintenance medications or in diet. Will recheck INR in 5 days. Patient is doing well with post op pain from TKA.  No longer taking norco and is taking acetaminophen only as needed. Trazodone was initiated while in hospital.  The interaction potential is varied but may enhance warfarin's affects.  Patient will hold warfarin for 2 days and slightly adjust dosing over the next 5 days. Reviewed signs of bleeding with patient.  Patient states her daughter was recently diagnosed with breast cancer and is pending surgery on . Patient's stress level is elevated which may also be contributing to her supratherapeutic INR.  Patient acknowledges working in consult agreement with pharmacist as referred by his/her physician.                  For Pharmacy Admin Tracking Only    Intervention Detail: Adherence Monitorin and Dose

## 2025-02-13 ENCOUNTER — APPOINTMENT (OUTPATIENT)
Age: 85
End: 2025-02-13
Payer: COMMERCIAL

## 2025-02-21 ENCOUNTER — ANTI-COAG VISIT (OUTPATIENT)
Age: 85
End: 2025-02-21
Payer: COMMERCIAL

## 2025-02-21 VITALS — SYSTOLIC BLOOD PRESSURE: 129 MMHG | DIASTOLIC BLOOD PRESSURE: 82 MMHG | HEART RATE: 85 BPM

## 2025-02-21 DIAGNOSIS — Z79.01 LONG TERM CURRENT USE OF ANTICOAGULANT THERAPY: Primary | Chronic | ICD-10-CM

## 2025-02-21 LAB
INTERNATIONAL NORMALIZATION RATIO, POC: 4.3
PROTHROMBIN TIME, POC: 0

## 2025-02-21 PROCEDURE — 99212 OFFICE O/P EST SF 10 MIN: CPT | Performed by: COUNSELOR

## 2025-02-21 PROCEDURE — 85610 PROTHROMBIN TIME: CPT | Performed by: COUNSELOR

## 2025-02-21 NOTE — PATIENT INSTRUCTIONS
Please hold warfarin today and decrease Saturday's dose to 3.75 mg.   Continue current dose of warfarin as instructed on dosing calendar provided.   Continue to monitor urine and stool for signs and symptoms of bleeding.   Please notify the clinic of any medication changes.   Please remember to bring all medications (both prescription and OTC) to your next visit. Kindly notify the clinic if you are unable to make to your next appointment.

## 2025-02-21 NOTE — PROGRESS NOTES
[facility]  Medication Management  ANTICOAGULATION    Referring Provider: Dr. Streeter    GOAL INR: 2-3    TODAY'S INR: 4.3    WARFARIN Dosage: Hold x1, 3.75 mg x 1, then decrease warfarin to 3.75 mg po every MWF; 7.5 mg po all other days    INR (no units)   Date Value   2025 1.3   2025 1.1   2024 1.0   2024 2.3   2024 2.4   2024 2.5   2024 3     INR,(POC) (no units)   Date Value   2025 4.3   2025 5.2   2025 2.8   2025 1.6   2025 1.1   2025 2.0   2024 2.4       Hemoglobin   Date Value Ref Range Status   2025 11.4 (L) 11.9 - 15.1 g/dL Final     Hematocrit   Date Value Ref Range Status   2025 35.5 (L) 36.3 - 47.1 % Final     ALT   Date Value Ref Range Status   2025 12 10 - 35 U/L Final     AST   Date Value Ref Range Status   2025 17 10 - 35 U/L Final       Medication changes:  No changes    Notes:    Fingerstick INR drawn per clinic protocol. Patient states no visible blood in urine and no black tarry stool. Denies any missed doses of warfarin. No change in other maintenance medications or in diet. Will recheck INR in 4 days. Patient acknowledges working in consult agreement with pharmacist as referred by his/her physician.   Patient's stress level is elevated with her daughter's recent breast cancer diagnosis with surgery today.                For Pharmacy Admin Tracking Only    Intervention Detail: Adherence Monitorin and Dose Adjustment: 3, reason: Improve Adherence, Therapy De-escalation, Therapy Optimization  Total # of Interventions Recommended: 3  Total # of Interventions Accepted: 3  Time Spent (min): 20      Allison Deng RPH

## 2025-02-25 ENCOUNTER — ANTI-COAG VISIT (OUTPATIENT)
Age: 85
End: 2025-02-25
Payer: COMMERCIAL

## 2025-02-25 VITALS
BODY MASS INDEX: 35.61 KG/M2 | DIASTOLIC BLOOD PRESSURE: 80 MMHG | SYSTOLIC BLOOD PRESSURE: 143 MMHG | HEART RATE: 93 BPM | WEIGHT: 201 LBS

## 2025-02-25 DIAGNOSIS — Z79.01 LONG TERM CURRENT USE OF ANTICOAGULANT THERAPY: Primary | Chronic | ICD-10-CM

## 2025-02-25 LAB
INTERNATIONAL NORMALIZATION RATIO, POC: 3.8
PROTHROMBIN TIME, POC: 0

## 2025-02-25 PROCEDURE — 85610 PROTHROMBIN TIME: CPT

## 2025-02-25 PROCEDURE — 99212 OFFICE O/P EST SF 10 MIN: CPT

## 2025-02-25 NOTE — PROGRESS NOTES
[facility]  Medication Management  ANTICOAGULATION    Referring Provider: Dr. Streeter     GOAL INR: 2-3     TODAY'S INR: 3.8     WARFARIN Dosage: Hold x1, then decrease warfarin to 7.5 mg po every MWF; 3.75 mg po all other days    INR (no units)   Date Value   2025 1.3   2025 1.1   2024 1.0   2024 2.3   2024 2.4   2024 2.5   2024 3     INR,(POC) (no units)   Date Value   2025 4.3   2025 5.2   2025 2.8   2025 1.6   2025 1.1   2025 2.0   2024 2.4       Hemoglobin   Date Value Ref Range Status   2025 11.4 (L) 11.9 - 15.1 g/dL Final     Hematocrit   Date Value Ref Range Status   2025 35.5 (L) 36.3 - 47.1 % Final     ALT   Date Value Ref Range Status   2025 12 10 - 35 U/L Final     AST   Date Value Ref Range Status   2025 17 10 - 35 U/L Final       Medication changes:  Has not been on Ozempic for 2 wks - ran out  Started Trazodone 2 wks ago    Notes:    Fingerstick INR drawn per clinic protocol. Patient states no visible blood in urine and no black tarry stool. Denies any missed doses of warfarin. No change in other maintenance medications or in diet. Will recheck INR in 4 weeks. Patient acknowledges working in consult agreement with pharmacist as referred by his/her physician.               Patient cannot come back for INR check until 3/28/25 due to patient going to [place] for a month.       Patient started taking Trazodone 2 weeks ago and hasn't had Ozempic for 2 weeks.  She ran out and likely will not get another script until she returns from [place] next month.    For Pharmacy Admin Tracking Only    Intervention Detail: Adherence Monitorin and Dose Adjustment: 1, reason: Therapy De-escalation, Therapy Optimization  Total # of Interventions Recommended: 3  Total # of Interventions Accepted: 3  Time Spent (min): 20      Melinda Cronin RPH, PharmD

## 2025-03-28 ENCOUNTER — ANTI-COAG VISIT (OUTPATIENT)
Age: 85
End: 2025-03-28
Payer: COMMERCIAL

## 2025-03-28 VITALS
SYSTOLIC BLOOD PRESSURE: 136 MMHG | WEIGHT: 206 LBS | DIASTOLIC BLOOD PRESSURE: 78 MMHG | BODY MASS INDEX: 36.49 KG/M2 | HEART RATE: 92 BPM

## 2025-03-28 DIAGNOSIS — Z79.01 LONG TERM CURRENT USE OF ANTICOAGULANT THERAPY: Primary | Chronic | ICD-10-CM

## 2025-03-28 LAB
INTERNATIONAL NORMALIZATION RATIO, POC: 1.3
PROTHROMBIN TIME, POC: 0

## 2025-03-28 PROCEDURE — 85610 PROTHROMBIN TIME: CPT

## 2025-03-28 PROCEDURE — 99212 OFFICE O/P EST SF 10 MIN: CPT

## 2025-03-28 NOTE — PROGRESS NOTES
[facility]  Medication Management  ANTICOAGULATION    Referring Provider: Dr. Streeter     GOAL INR: 2 - 3     TODAY'S INR: 1.3     WARFARIN Dosage: Take warfarin 7.5 mg Saturday 3/29/25 then continue warfarin 7.5 mg tablet MW and half tablet for 3.75 mg all other days.    INR (no units)   Date Value   2025 1.3   2025 1.1   2024 1.0   2024 2.3   2024 2.4   2024 2.5   2024 3     INR,(POC) (no units)   Date Value   2025 3.8   2025 4.3   2025 5.2   2025 2.8   2025 1.6   2025 1.1   2025 2.0       Hemoglobin   Date Value Ref Range Status   2025 11.4 (L) 11.9 - 15.1 g/dL Final     Hematocrit   Date Value Ref Range Status   2025 35.5 (L) 36.3 - 47.1 % Final     ALT   Date Value Ref Range Status   2025 12 10 - 35 U/L Final     AST   Date Value Ref Range Status   2025 17 10 - 35 U/L Final       Medication changes:  Restarting Ozempic      Notes:    Fingerstick INR drawn per clinic protocol. Patient states no visible blood in urine and no black tarry stool. Denies any missed doses of warfarin. No change in other maintenance medications or in diet. Will recheck INR in 2 weeks. Patient acknowledges working in consult agreement with pharmacist as referred by his/her physician.                  For Pharmacy Admin Tracking Only    Intervention Detail: Adherence Monitorin, Dose Adjustment: 1, reason: Therapy Optimization, and New Rx: 1, reason: Patient Preference  Total # of Interventions Recommended: 3  Total # of Interventions Accepted: 3  Time Spent (min): 20      Danyell Harris [facility], PharmD

## 2025-04-07 ENCOUNTER — HOSPITAL ENCOUNTER (OUTPATIENT)
Age: 85
Discharge: HOME | End: 2025-04-07
Payer: COMMERCIAL

## 2025-04-07 DIAGNOSIS — R73.01 IMPAIRED FASTING GLUCOSE: ICD-10-CM

## 2025-04-07 DIAGNOSIS — E78.2 MIXED HYPERLIPIDEMIA: ICD-10-CM

## 2025-04-07 LAB
ALT SERPL-CCNC: 14 U/L (ref 10–35)
ANION GAP SERPL CALCULATED.3IONS-SCNC: 9 MMOL/L (ref 9–16)
AST SERPL-CCNC: 19 U/L (ref 10–35)
BUN SERPL-MCNC: 12 MG/DL (ref 8–23)
BUN/CREAT SERPL: 13 (ref 9–20)
CALCIUM SERPL-MCNC: 9.4 MG/DL (ref 8.6–10.4)
CANCER AG125 SERPL-ACNC: 21 U/ML (ref 0–38)
CHLORIDE SERPL-SCNC: 106 MMOL/L (ref 98–107)
CHOLEST SERPL-MCNC: 134 MG/DL (ref 0–199)
CHOLESTEROL/HDL RATIO: 2.6
CO2 SERPL-SCNC: 24 MMOL/L (ref 20–31)
CREAT SERPL-MCNC: 0.9 MG/DL (ref 0.5–0.9)
ERYTHROCYTE [DISTWIDTH] IN BLOOD BY AUTOMATED COUNT: 13.7 % (ref 11.8–14.4)
EST. AVERAGE GLUCOSE BLD GHB EST-MCNC: 126 MG/DL
GFR, ESTIMATED: 64 ML/MIN/1.73M2
GLUCOSE SERPL-MCNC: 117 MG/DL (ref 74–99)
HBA1C MFR BLD: 6 % (ref 4–6)
HCT VFR BLD AUTO: 42.7 % (ref 36.3–47.1)
HDLC SERPL-MCNC: 52 MG/DL
HGB BLD-MCNC: 13.7 G/DL (ref 11.9–15.1)
LDLC SERPL CALC-MCNC: 54 MG/DL (ref 0–100)
MCH RBC QN AUTO: 29.6 PG (ref 25.2–33.5)
MCHC RBC AUTO-ENTMCNC: 32.1 G/DL (ref 28.4–34.8)
MCV RBC AUTO: 92.2 FL (ref 82.6–102.9)
NRBC BLD-RTO: 0 PER 100 WBC
PLATELET # BLD AUTO: 279 K/UL (ref 138–453)
PMV BLD AUTO: 9.5 FL (ref 8.1–13.5)
POTASSIUM SERPL-SCNC: 4.4 MMOL/L (ref 3.7–5.3)
RBC # BLD AUTO: 4.63 M/UL (ref 3.95–5.11)
SODIUM SERPL-SCNC: 139 MMOL/L (ref 136–145)
TRIGL SERPL-MCNC: 141 MG/DL
VLDLC SERPL CALC-MCNC: 28 MG/DL (ref 1–30)
WBC OTHER # BLD: 5.8 K/UL (ref 3.5–11.3)

## 2025-04-07 PROCEDURE — 84450 TRANSFERASE (AST) (SGOT): CPT

## 2025-04-07 PROCEDURE — 86304 IMMUNOASSAY TUMOR CA 125: CPT

## 2025-04-07 PROCEDURE — 80061 LIPID PANEL: CPT

## 2025-04-07 PROCEDURE — 83036 HEMOGLOBIN GLYCOSYLATED A1C: CPT

## 2025-04-07 PROCEDURE — 80048 BASIC METABOLIC PNL TOTAL CA: CPT

## 2025-04-07 PROCEDURE — 84460 ALANINE AMINO (ALT) (SGPT): CPT

## 2025-04-07 PROCEDURE — 36415 COLL VENOUS BLD VENIPUNCTURE: CPT

## 2025-04-07 PROCEDURE — 85027 COMPLETE CBC AUTOMATED: CPT

## 2025-04-10 ENCOUNTER — RESULTS FOLLOW-UP (OUTPATIENT)
Dept: GYNECOLOGIC ONCOLOGY | Age: 85
End: 2025-04-10

## 2025-04-11 ENCOUNTER — ANTI-COAG VISIT (OUTPATIENT)
Age: 85
End: 2025-04-11
Payer: COMMERCIAL

## 2025-04-11 ENCOUNTER — OFFICE VISIT (OUTPATIENT)
Dept: GYNECOLOGIC ONCOLOGY | Age: 85
End: 2025-04-11
Payer: COMMERCIAL

## 2025-04-11 VITALS
SYSTOLIC BLOOD PRESSURE: 128 MMHG | DIASTOLIC BLOOD PRESSURE: 76 MMHG | HEART RATE: 88 BPM | BODY MASS INDEX: 35.78 KG/M2 | TEMPERATURE: 97 F | OXYGEN SATURATION: 95 % | WEIGHT: 202 LBS

## 2025-04-11 DIAGNOSIS — Z79.01 LONG TERM CURRENT USE OF ANTICOAGULANT THERAPY: Primary | Chronic | ICD-10-CM

## 2025-04-11 DIAGNOSIS — N94.89 ADNEXAL MASS: Primary | ICD-10-CM

## 2025-04-11 LAB
INTERNATIONAL NORMALIZATION RATIO, POC: 1.8
PROTHROMBIN TIME, POC: 0

## 2025-04-11 PROCEDURE — 85610 PROTHROMBIN TIME: CPT | Performed by: COUNSELOR

## 2025-04-11 PROCEDURE — 99212 OFFICE O/P EST SF 10 MIN: CPT | Performed by: COUNSELOR

## 2025-04-11 PROCEDURE — 99213 OFFICE O/P EST LOW 20 MIN: CPT | Performed by: OBSTETRICS & GYNECOLOGY

## 2025-04-11 PROCEDURE — 1126F AMNT PAIN NOTED NONE PRSNT: CPT | Performed by: OBSTETRICS & GYNECOLOGY

## 2025-04-11 PROCEDURE — 1159F MED LIST DOCD IN RCRD: CPT | Performed by: OBSTETRICS & GYNECOLOGY

## 2025-04-11 PROCEDURE — 1123F ACP DISCUSS/DSCN MKR DOCD: CPT | Performed by: OBSTETRICS & GYNECOLOGY

## 2025-04-11 ASSESSMENT — ENCOUNTER SYMPTOMS
RECTAL PAIN: 0
BACK PAIN: 1
CHEST TIGHTNESS: 0
SCLERAL ICTERUS: 0
DIARRHEA: 0
NAUSEA: 0
COUGH: 0
BLOOD IN STOOL: 0
SHORTNESS OF BREATH: 0
VOICE CHANGE: 0
EYE PROBLEMS: 0
CONSTIPATION: 0
ABDOMINAL DISTENTION: 0
TROUBLE SWALLOWING: 0
VOMITING: 0
HEMOPTYSIS: 0
ABDOMINAL PAIN: 0
SORE THROAT: 0
WHEEZING: 0

## 2025-04-11 NOTE — PROGRESS NOTES
Gyn Onc Note    Patient seen and evaluated by me today  She declines vaginal examination today    She has a history of a likely benign ovarian cyst  US pending now for next week or two   level has gone down to normal now    No complaints today  No pain  No GI or  troubles    She wants to do annual surveillance now  She wants an US and  level every April  Telemed visit every April to discuss her testing results     She does not necessarily need to return to clinic for a physical exam  In my opinion ( and her opinion ) it is reasonable to follow up her findings like this    Plan:    Call for US results in two weeks  Repeat testing spring 2026  Tele med visit to discuss results April 2026  Call or return sooner prn for in person evaluation  I spent 20 minutes today in office managing this case    FLORENCIO Brady MD  
Review of Systems   Constitutional:  Negative for appetite change, chills, diaphoresis, fatigue, fever and unexpected weight change.   HENT:   Negative for hearing loss, lump/mass, mouth sores, nosebleeds, sore throat, tinnitus, trouble swallowing and voice change.    Eyes:  Negative for eye problems and icterus.   Respiratory:  Negative for chest tightness, cough, hemoptysis, shortness of breath and wheezing.    Cardiovascular:  Negative for chest pain, leg swelling and palpitations.   Gastrointestinal:  Negative for abdominal distention, abdominal pain, blood in stool, constipation, diarrhea, nausea, rectal pain and vomiting.   Endocrine: Positive for hot flashes.   Genitourinary:  Negative for bladder incontinence, difficulty urinating, dyspareunia, dysuria, frequency, hematuria, menstrual problem, nocturia, pelvic pain, vaginal bleeding and vaginal discharge.    Musculoskeletal:  Positive for back pain. Negative for arthralgias, flank pain, gait problem, myalgias, neck pain and neck stiffness.   Skin:  Positive for rash (Under breasts). Negative for itching and wound.   Neurological:  Negative for dizziness, extremity weakness, gait problem, headaches, light-headedness, numbness, seizures and speech difficulty.   Hematological:  Negative for adenopathy. Bruises/bleeds easily.   Psychiatric/Behavioral:  Negative for confusion, decreased concentration, depression, sleep disturbance and suicidal ideas. The patient is not nervous/anxious.        
bilaterally.    Extremities: No edema, calf tenderness or deformities bilaterally    Pelvic: Declined       Assessment/Plan:  Ovarian Cyst    - Most recent US on 10/8/24 revealed 2.9 cm simple left ovarian cyst.  A left adnexal cyst previously measuring 1.9 cm.   -  wnl    - Asymptomatic    - Declines exam    - Plan: repeat TVUS no, repeat TVUS and  in one year    - F/u in 1 year       Electronically signed by Hector Gmoez MD on 4/11/25 at 12:17 PM EDT

## 2025-04-11 NOTE — PROGRESS NOTES
[facility]  Medication Management  ANTICOAGULATION    Referring Provider: Dr. Streeter     GOAL INR: 2 - 3     TODAY'S INR: 1.8     WARFARIN Dosage: Take warfarin 11.25 mg today, then continue warfarin 7.5 mg tablet MWF and half tablet for 3.75 mg all other days.    INR (no units)   Date Value   2025 1.3   2025 1.1   2024 1.0   2024 2.3   2024 2.4   2024 2.5   2024 3     INR,(POC) (no units)   Date Value   2025 1.8   2025 1.3   2025 3.8   2025 4.3   2025 5.2   2025 2.8   2025 1.6       Hemoglobin   Date Value Ref Range Status   2025 13.7 11.9 - 15.1 g/dL Final     Hematocrit   Date Value Ref Range Status   2025 42.7 36.3 - 47.1 % Final     ALT   Date Value Ref Range Status   2025 14 10 - 35 U/L Final     AST   Date Value Ref Range Status   2025 19 10 - 35 U/L Final       Medication changes:  Continues Ozempic - uncertain of dose    Notes:    Fingerstick INR drawn per clinic protocol. Patient states no visible blood in urine and no black tarry stool.  No change in other maintenance medications or in diet. Will recheck INR in 3 weeks. Patient acknowledges working in consult agreement with pharmacist as referred by his/her physician.      Patient believes she may have missed a dose.               For Pharmacy Admin Tracking Only    Intervention Detail: Adherence Monitorin and Dose Adjustment: 1, reason: Improve Adherence, Therapy Optimization  Total # of Interventions Recommended: 2  Total # of Interventions Accepted: 2  Time Spent (min): 20      Allison Deng RP

## 2025-04-11 NOTE — PATIENT INSTRUCTIONS
Please take warfarin 11.25 mg today.   Continue current dose of warfarin as instructed on dosing calendar provided.   Continue to monitor urine and stool for signs and symptoms of bleeding.   Please notify the clinic of any medication changes.   Please remember to bring all medications (both prescription and OTC) to your next visit. Kindly notify the clinic if you are unable to make to your next appointment.

## 2025-04-29 ENCOUNTER — HOSPITAL ENCOUNTER (OUTPATIENT)
Dept: ULTRASOUND IMAGING | Age: 85
Discharge: HOME | End: 2025-05-01
Payer: COMMERCIAL

## 2025-04-29 DIAGNOSIS — N94.89 ADNEXAL MASS: ICD-10-CM

## 2025-04-29 PROCEDURE — 76856 US EXAM PELVIC COMPLETE: CPT

## 2025-04-29 PROCEDURE — 93976 VASCULAR STUDY: CPT

## 2025-05-08 ENCOUNTER — APPOINTMENT (OUTPATIENT)
Age: 85
End: 2025-05-08
Payer: COMMERCIAL

## 2025-05-09 ENCOUNTER — ANTI-COAG VISIT (OUTPATIENT)
Age: 85
End: 2025-05-09
Payer: COMMERCIAL

## 2025-05-09 VITALS
SYSTOLIC BLOOD PRESSURE: 144 MMHG | WEIGHT: 198 LBS | HEART RATE: 109 BPM | BODY MASS INDEX: 35.07 KG/M2 | DIASTOLIC BLOOD PRESSURE: 85 MMHG

## 2025-05-09 DIAGNOSIS — Z79.01 LONG TERM CURRENT USE OF ANTICOAGULANT THERAPY: Primary | Chronic | ICD-10-CM

## 2025-05-09 LAB
INTERNATIONAL NORMALIZATION RATIO, POC: 2.7
PROTHROMBIN TIME, POC: 0

## 2025-05-09 PROCEDURE — 85610 PROTHROMBIN TIME: CPT

## 2025-05-09 PROCEDURE — 99211 OFF/OP EST MAY X REQ PHY/QHP: CPT

## 2025-05-09 NOTE — PROGRESS NOTES
[facility]  Medication Management  ANTICOAGULATION    Referring Provider: Dr. Streeter     GOAL INR: 2 - 3     TODAY'S INR: 2.7     WARFARIN Dosage: Continue warfarin 7.5 mg tablet MWF and half tablet for 3.75 mg all other days.    INR (no units)   Date Value   2025 1.3   2025 1.1   2024 1.0   2024 2.3   2024 2.4   2024 2.5   2024 3     INR,(POC) (no units)   Date Value   2025 1.8   2025 1.3   2025 3.8   2025 4.3   2025 5.2   2025 2.8   2025 1.6       Hemoglobin   Date Value Ref Range Status   2025 13.7 11.9 - 15.1 g/dL Final     Hematocrit   Date Value Ref Range Status   2025 42.7 36.3 - 47.1 % Final     ALT   Date Value Ref Range Status   2025 14 10 - 35 U/L Final     AST   Date Value Ref Range Status   2025 19 10 - 35 U/L Final       Medication changes:  Started Celexa    Notes:    Fingerstick INR drawn per clinic protocol. Patient states no visible blood in urine and no black tarry stool. Denies any missed doses of warfarin. No change in other maintenance medications or in diet. Will recheck INR in 4 weeks. Patient acknowledges working in consult agreement with pharmacist as referred by his/her physician.                  For Pharmacy Admin Tracking Only    Intervention Detail: Adherence Monitorin and New Rx: 1, reason: Needs Additional Therapy  Total # of Interventions Recommended: 2  Total # of Interventions Accepted: 2  Time Spent (min): 20      Danyell Harris [facility], PharmD

## 2025-06-04 ENCOUNTER — RESULTS FOLLOW-UP (OUTPATIENT)
Dept: GYNECOLOGIC ONCOLOGY | Age: 85
End: 2025-06-04

## 2025-06-04 ENCOUNTER — ANTI-COAG VISIT (OUTPATIENT)
Age: 85
End: 2025-06-04
Payer: COMMERCIAL

## 2025-06-04 VITALS
DIASTOLIC BLOOD PRESSURE: 92 MMHG | SYSTOLIC BLOOD PRESSURE: 163 MMHG | BODY MASS INDEX: 35.43 KG/M2 | WEIGHT: 200 LBS | HEART RATE: 99 BPM

## 2025-06-04 DIAGNOSIS — N94.89 ADNEXAL MASS: Primary | ICD-10-CM

## 2025-06-04 DIAGNOSIS — Z79.01 LONG TERM CURRENT USE OF ANTICOAGULANT THERAPY: Primary | Chronic | ICD-10-CM

## 2025-06-04 LAB
INTERNATIONAL NORMALIZATION RATIO, POC: 2
PROTHROMBIN TIME, POC: 0

## 2025-06-04 PROCEDURE — 99211 OFF/OP EST MAY X REQ PHY/QHP: CPT

## 2025-06-04 PROCEDURE — 85610 PROTHROMBIN TIME: CPT

## 2025-06-04 NOTE — PROGRESS NOTES
[facility]  Medication Management  ANTICOAGULATION    Referring Provider: Dr. Streeter     GOAL INR: 2 - 3     TODAY'S INR: 2.0     WARFARIN Dosage: Continue warfarin 7.5 mg tablet MWF and half tablet for 3.75 mg all other days.    INR (no units)   Date Value   2025 1.3   2025 1.1   2024 1.0   2024 2.3   2024 2.4   2024 2.5   2024 3     INR,(POC) (no units)   Date Value   2025 2.7   2025 1.8   2025 1.3   2025 3.8   2025 4.3   2025 5.2   2025 2.8       Hemoglobin   Date Value Ref Range Status   2025 13.7 11.9 - 15.1 g/dL Final     Hematocrit   Date Value Ref Range Status   2025 42.7 36.3 - 47.1 % Final     ALT   Date Value Ref Range Status   2025 14 10 - 35 U/L Final     AST   Date Value Ref Range Status   2025 19 10 - 35 U/L Final       Medication changes:  Stopped Norco  Stopped prednisone    Notes:    Fingerstick INR drawn per clinic protocol. Patient states no visible blood in urine and no black tarry stool. Denies any missed doses of warfarin. No change in other maintenance medications or in diet. Will recheck INR in 4 weeks. Patient acknowledges working in consult agreement with pharmacist as referred by his/her physician.   Yajaira has a cyst on her ovary but it is not not CA.   Yajaira is flying on  to [place] for 2 days then a 12 day cruise with her family.             For Pharmacy Admin Tracking Only    Intervention Detail: Adherence Monitorin and New Rx: 2, reason: Patient Preference  Total # of Interventions Recommended: 3  Total # of Interventions Accepted: 3  Time Spent (min): 20      Danyell Harris RPH, PharmD

## 2025-06-04 NOTE — RESULT ENCOUNTER NOTE
Called and informed patient of US results and instructions.  Patient voiced understanding and appreciation.

## 2025-07-03 ENCOUNTER — ANTI-COAG VISIT (OUTPATIENT)
Age: 85
End: 2025-07-03
Payer: COMMERCIAL

## 2025-07-03 VITALS
BODY MASS INDEX: 35.25 KG/M2 | HEART RATE: 89 BPM | WEIGHT: 199 LBS | SYSTOLIC BLOOD PRESSURE: 146 MMHG | DIASTOLIC BLOOD PRESSURE: 82 MMHG

## 2025-07-03 DIAGNOSIS — Z79.01 LONG TERM CURRENT USE OF ANTICOAGULANT THERAPY: Primary | Chronic | ICD-10-CM

## 2025-07-03 LAB
INTERNATIONAL NORMALIZATION RATIO, POC: 1.5
PROTHROMBIN TIME, POC: 0

## 2025-07-03 PROCEDURE — 99212 OFFICE O/P EST SF 10 MIN: CPT

## 2025-07-03 PROCEDURE — 85610 PROTHROMBIN TIME: CPT

## 2025-07-03 NOTE — PROGRESS NOTES
[facility]  Medication Management  ANTICOAGULATION    Referring Provider: Dr. Streeter     GOAL INR: 2 - 3     TODAY'S INR: 1.5     WARFARIN Dosage: Take warfarin 7.5 mg tablet today and this Saturday (25) then continue warfarin 7.5 mg tablet MWF and half tablet for 3.75 mg all other days.    INR (no units)   Date Value   2025 1.3   2025 1.1   2024 1.0   2024 2.3   2024 2.4   2024 2.5   2024 3     INR,(POC) (no units)   Date Value   2025 2.0   2025 2.7   2025 1.8   2025 1.3   2025 3.8   2025 4.3   2025 5.2       Hemoglobin   Date Value Ref Range Status   2025 13.7 11.9 - 15.1 g/dL Final     Hematocrit   Date Value Ref Range Status   2025 42.7 36.3 - 47.1 % Final     ALT   Date Value Ref Range Status   2025 14 10 - 35 U/L Final     AST   Date Value Ref Range Status   2025 19 10 - 35 U/L Final       Medication changes:  none    Notes:    Fingerstick INR drawn per clinic protocol. Patient states no visible blood in urine and no black tarry stool. Denies any missed doses of warfarin. No change in other maintenance medications or in diet. Will recheck INR in 3 weeks. Patient acknowledges working in consult agreement with pharmacist as referred by his/her physician.   Yajaira has a cyst on her ovary but it is not not CA.   Yajaira is flying on  to [place] for 2 days then a 12 day cruise with her family.  Patient is stressed about leaving on vacation and not being ready.  [place] likely missed some warfarin doses.       For Pharmacy Admin Tracking Only    Intervention Detail: Adherence Monitorin and Dose Adjustment: 1, reason: Therapy Optimization  Total # of Interventions Recommended: 2  Total # of Interventions Accepted: 2  Time Spent (min): 20      Danyell Harris RPH, PharmD

## 2025-07-03 NOTE — PATIENT INSTRUCTIONS
Continue to monitor urine and stool for signs and symptoms of bleeding.   Please notify the clinic of any medication changes.   Please remember to bring all medications (both prescription and OTC) to your next visit.   Kindly notify the clinic if you are unable to make to your next appointment.   Follow warfarin dosing instructions on dosing calendar provided.   If drinking more alcohol you can decrease warfarin to half tablet but take a whole 7.5 mg tablet at least weekly and a half tablet for 3.75 mg each day.  Do not miss any days.

## 2025-07-23 ENCOUNTER — APPOINTMENT (OUTPATIENT)
Age: 85
End: 2025-07-23
Payer: COMMERCIAL

## 2025-07-25 ENCOUNTER — ANTI-COAG VISIT (OUTPATIENT)
Age: 85
End: 2025-07-25
Payer: COMMERCIAL

## 2025-07-25 VITALS
DIASTOLIC BLOOD PRESSURE: 90 MMHG | SYSTOLIC BLOOD PRESSURE: 151 MMHG | WEIGHT: 203 LBS | BODY MASS INDEX: 35.96 KG/M2 | HEART RATE: 86 BPM

## 2025-07-25 DIAGNOSIS — Z79.01 LONG TERM CURRENT USE OF ANTICOAGULANT THERAPY: Primary | Chronic | ICD-10-CM

## 2025-07-25 LAB
INTERNATIONAL NORMALIZATION RATIO, POC: 1.9
PROTHROMBIN TIME, POC: 0

## 2025-07-25 PROCEDURE — 99211 OFF/OP EST MAY X REQ PHY/QHP: CPT

## 2025-07-25 PROCEDURE — 85610 PROTHROMBIN TIME: CPT

## 2025-07-25 NOTE — PROGRESS NOTES
[facility]  Medication Management  ANTICOAGULATION    Referring Provider: Dr. Streeter     GOAL INR: 2 - 3     TODAY'S INR: 1.9     WARFARIN Dosage: Continue warfarin 7.5 mg tablet MWF and half tablet for 3.75 mg all other days.    INR (no units)   Date Value   2025 1.3   2025 1.1   2024 1.0   2024 2.3   2024 2.4   2024 2.5   2024 3     INR,(POC) (no units)   Date Value   2025 1.5   2025 2.0   2025 2.7   2025 1.8   2025 1.3   2025 3.8   2025 4.3       Hemoglobin   Date Value Ref Range Status   2025 13.7 11.9 - 15.1 g/dL Final     Hematocrit   Date Value Ref Range Status   2025 42.7 36.3 - 47.1 % Final     ALT   Date Value Ref Range Status   2025 14 10 - 35 U/L Final     AST   Date Value Ref Range Status   2025 19 10 - 35 U/L Final       Medication changes:  none    Notes:    Fingerstick INR drawn per clinic protocol. Patient states no visible blood in urine and no black tarry stool. Denies any missed doses of warfarin. No change in other maintenance medications or in diet. Will recheck INR in 4 weeks. Patient acknowledges working in consult agreement with pharmacist as referred by his/her physician.     Just returned from a family cruise in [place].             For Pharmacy Admin Tracking Only    Intervention Detail: Adherence Monitorin  Total # of Interventions Recommended: 1  Total # of Interventions Accepted: 1  Time Spent (min): 20      Danyell Harris RPH, PharmD

## 2025-08-22 ENCOUNTER — ANTI-COAG VISIT (OUTPATIENT)
Age: 85
End: 2025-08-22
Payer: COMMERCIAL

## 2025-08-22 VITALS — WEIGHT: 198 LBS | BODY MASS INDEX: 35.08 KG/M2 | HEIGHT: 63 IN

## 2025-08-22 DIAGNOSIS — Z79.01 LONG TERM CURRENT USE OF ANTICOAGULANT THERAPY: Primary | Chronic | ICD-10-CM

## 2025-08-22 LAB
INTERNATIONAL NORMALIZATION RATIO, POC: 1.2
PROTHROMBIN TIME, POC: NORMAL

## 2025-08-22 PROCEDURE — 85610 PROTHROMBIN TIME: CPT | Performed by: FAMILY MEDICINE

## 2025-08-22 PROCEDURE — 99212 OFFICE O/P EST SF 10 MIN: CPT | Performed by: FAMILY MEDICINE

## (undated) DEVICE — APPLICATOR MEDICATED 10.5 CC SOLUTION HI LT ORNG CHLORAPREP

## (undated) DEVICE — STAPLER EXT SKIN 35 WIDE S STL STPL SQUEEZE HNDL VISISTAT

## (undated) DEVICE — PENCIL SMK EVAC L10FT TBNG NONSTICK ESU BLDE PLUMEPEN ELITE

## (undated) DEVICE — BANDAGE ADH W1XL3IN NAT FAB WVN FLX DURABLE N ADH PD SEAL

## (undated) DEVICE — MERCY TIFFIN TOTAL KNEE: Brand: MEDLINE INDUSTRIES, INC.

## (undated) DEVICE — GLOVE SURG SZ 75 L12IN FNGR THK79MIL GRN LTX FREE

## (undated) DEVICE — PADDING CAST W2INXL4YD COT LO LINTING WYTEX

## (undated) DEVICE — OCCLUSIVE GAUZE STRIP,3% BISMUTH TRIBROMOPHENATE IN PETROLATUM BLEND: Brand: XEROFORM

## (undated) DEVICE — SOLUTION WND IRRIGATION 450 ML 0.5 PVP-I 0.9 NACL

## (undated) DEVICE — SOLUTION IV IRRIG POUR BRL 0.9% SODIUM CHL 2F7124

## (undated) DEVICE — TOWEL,OR,DSP,ST,NATURAL,DLX,4/PK,20PK/CS: Brand: MEDLINE

## (undated) DEVICE — SUTURE ETHLN SZ 5-0 L18IN NONABSORBABLE BLK L13MM P-3 3/8 698H

## (undated) DEVICE — MIS 3.2MM X 45MM RIMMED PIN STERILE: Brand: HARMONY

## (undated) DEVICE — HAND AND FT PK

## (undated) DEVICE — SOLUTION IV 50ML 0.9% SOD CHL PLAS CONT USP VIAFLX

## (undated) DEVICE — SPONGE LAP W18XL18IN WHT COT 4 PLY FLD STRUNG RADPQ DISP ST 2 PER PACK

## (undated) DEVICE — SUTURE VICRYL SZ 1 L36IN ABSRB UD L36MM CT-1 1/2 CIR J947H

## (undated) DEVICE — GLOVE SURG SZ 8 CRM LTX FREE POLYISOPRENE POLYMER BEAD ANTI

## (undated) DEVICE — SOLUTION SCRB 4OZ 4% CHG CLN BASE FOR PT SKIN ANTISEPSIS

## (undated) DEVICE — SOLUTION IRRIG 3000ML 0.9% SOD CHL USP UROMATIC PLAS CONT

## (undated) DEVICE — GLOVE SURG SZ 75 CRM LTX FREE POLYISOPRENE POLYMER BEAD ANTI

## (undated) DEVICE — SOLUTION IRRIG 1000ML STRL H2O USP PLAS POUR BTL

## (undated) DEVICE — LEUKOPLAST ELASTIC STERILE 38X50MM TAN 100 1.5"X2": Brand: LEUKOPLAST®

## (undated) DEVICE — Z DISCONTINUED BY MEDLINE USE 2711682 TRAY SKIN PREP DRY W/ PREM GLV

## (undated) DEVICE — DRESSING HYDROFIBER AQUACEL AG ADVANTAGE 3.5X10 IN

## (undated) DEVICE — PEN: MARKING STD 100/CS: Brand: MEDICAL ACTION INDUSTRIES

## (undated) DEVICE — BANDAGE COMPR W6INXL12FT SMOOTH FOR LIMB EXSANG ESMARCH

## (undated) DEVICE — NERVE BLOCK TRAY: Brand: MEDLINE INDUSTRIES, INC.

## (undated) DEVICE — NON RIMMED SPEED PIN 65MM STERILE

## (undated) DEVICE — STERILE PATIENT PROTECTIVE PAD FOR IMP® KNEE POSITIONERS & COHESIVE WRAP (10 / CASE): Brand: DE MAYO KNEE POSITIONER®

## (undated) DEVICE — 450 ML BOTTLE OF 0.05% CHLORHEXIDINE GLUCONATE IN 99.95% STERILE WATER FOR IRRIGATION, USP AND APPLICATOR.: Brand: IRRISEPT ANTIMICROBIAL WOUND LAVAGE

## (undated) DEVICE — SOLUTION IRRIG 1000ML 0.9% SOD CHL USP POUR PLAS BTL

## (undated) DEVICE — NEEDLE HYPO 18GA L1IN PNK POLYPR HUB S STL REG BVL STR W/O

## (undated) DEVICE — GLOVE ORANGE PI 7 1/2   MSG9075

## (undated) DEVICE — MANIFOLD REPROC SUCT 4 PRT F/NEPTUNE 2 WST MGMT SYS

## (undated) DEVICE — SYRINGE, LUER LOCK, 10ML: Brand: MEDLINE

## (undated) DEVICE — CEMENT MIXING SYSTEM WITH FEMORAL BREAKWAY NOZZLE: Brand: REVOLUTION

## (undated) DEVICE — ZIMMER® STERILE DISPOSABLE TOURNIQUET CUFF WITH PROTECTIVE SLEEVE AND PLC, SINGLE PORT, SINGLE BLADDER, 34 IN. (86 CM)

## (undated) DEVICE — SYRINGE 20ML LL S/C 50

## (undated) DEVICE — SUTURE VICRYL + SZ 2-0 L36IN ABSRB UD L36MM CT-1 1/2 CIR VCP945H

## (undated) DEVICE — GLOVE SURG SZ 75 L12IN FNGR THK87MIL DK GRN LTX FREE ISOLEX

## (undated) DEVICE — SUTURE ABSORBABLE MONOFILAMENT 1 CTX 36 CM 48 MM VIO PDS +

## (undated) DEVICE — GLOVE SURG SZ 75 L12IN FNGR THK87MIL WHT LTX FREE

## (undated) DEVICE — HYPODERMIC SAFETY NEEDLE: Brand: MAGELLAN

## (undated) DEVICE — NEEDLE, QUINCKE 25GX3.5": Brand: MEDLINE

## (undated) DEVICE — SUTURE VICRYL + 1 L36IN ABSRB VLT CT-1 L36MM 1/2 CIR VCP347H

## (undated) DEVICE — UNDERGLOVE BIOGEL ULTRATOUCH S INDICATOR SZ 8

## (undated) DEVICE — VISIONAIRE ADAPTIVE GUIDE KIT                                    LEGION PRIMARY: Brand: VISIONAIRE

## (undated) DEVICE — NEEDLE SPNL 25GA L3.5IN BLU HUB S STL REG WALL FIT STYL W/